# Patient Record
Sex: FEMALE | Race: BLACK OR AFRICAN AMERICAN | Employment: FULL TIME | URBAN - METROPOLITAN AREA
[De-identification: names, ages, dates, MRNs, and addresses within clinical notes are randomized per-mention and may not be internally consistent; named-entity substitution may affect disease eponyms.]

---

## 2017-01-02 ENCOUNTER — TRANSFERRED RECORDS (OUTPATIENT)
Dept: HEALTH INFORMATION MANAGEMENT | Facility: CLINIC | Age: 37
End: 2017-01-02

## 2017-01-03 ENCOUNTER — TRANSFERRED RECORDS (OUTPATIENT)
Dept: HEALTH INFORMATION MANAGEMENT | Facility: CLINIC | Age: 37
End: 2017-01-03

## 2017-01-05 ENCOUNTER — TELEPHONE (OUTPATIENT)
Dept: FAMILY MEDICINE | Facility: CLINIC | Age: 37
End: 2017-01-05

## 2017-01-05 NOTE — TELEPHONE ENCOUNTER
Attempted to reach patient via language line to follow up after recent discharge on 1/4/17 from [facility]. Unable to reach. LVM with name and call back number.    Renata Bedoya RN

## 2017-04-11 ENCOUNTER — OFFICE VISIT (OUTPATIENT)
Dept: FAMILY MEDICINE | Facility: CLINIC | Age: 37
End: 2017-04-11

## 2017-04-11 VITALS
WEIGHT: 147.8 LBS | HEIGHT: 64 IN | TEMPERATURE: 97.8 F | DIASTOLIC BLOOD PRESSURE: 65 MMHG | HEART RATE: 77 BPM | SYSTOLIC BLOOD PRESSURE: 104 MMHG | BODY MASS INDEX: 25.23 KG/M2 | OXYGEN SATURATION: 99 % | RESPIRATION RATE: 18 BRPM

## 2017-04-11 DIAGNOSIS — B36.9 FUNGAL INFECTION OF SKIN: ICD-10-CM

## 2017-04-11 RX ORDER — NYSTATIN 100000 U/G
OINTMENT TOPICAL
Qty: 15 G | Refills: 1 | Status: SHIPPED | OUTPATIENT
Start: 2017-04-11 | End: 2017-05-11

## 2017-04-11 NOTE — PROGRESS NOTES
"      HPI:       Julio Cesar Cha is a 37 year old who presents for the following  Patient presents with:  Derm Problem: around mouth, small red, itchy, 2-3 weeks    Left cheek linear rash for 3 weeks, itchy  In the past had a right neck rash which improved with triamcinolone cream  Used triamcinalone cream with her current rash, it's not helping  Has had ringworm before, last year. Children have had it, last was 2 years ago.     Problem, Medication and Allergy Lists were reviewed and are current.  Patient is an established patient of this clinic.         Review of Systems:   Review of Systems          Physical Exam:   Patient Vitals for the past 24 hrs:   BP Temp Temp src Pulse Resp SpO2 Height Weight   04/11/17 1331 104/65 97.8  F (36.6  C) Oral 77 18 99 % 5' 3.75\" (161.9 cm) 147 lb 12.8 oz (67 kg)     Body mass index is 25.57 kg/(m^2).     Physical Exam   Constitutional: She appears well-developed and well-nourished.   HENT:   Head: Normocephalic and atraumatic.       2 curved linear streaks on left side of face, raised, red, shiny   Eyes: Conjunctivae are normal.   Cardiovascular: Normal rate.    Pulmonary/Chest: Effort normal. No respiratory distress.   Skin: Skin is warm and dry. Rash noted. No erythema. No pallor.   Vitals reviewed.      Results:       Assessment and Plan     Patient Instructions   Here is the plan from today's visit    1. Fungal infection of skin - ringworm  - nystatin (MYCOSTATIN) ointment; Apply pea-sized amount to face rash two times daily until 1 week after the rash disappears  Dispense: 15 g; Refill: 1  Do not use cream for more than 2 weeks total    Please call or return to clinic if your symptoms don't go away.    Follow up plan  Follow up if you are not improving in the next 1 week.    There are no discontinued medications.  Options for treatment and follow-up care were reviewed with the patient. Julio Cesar Cha  engaged in the decision making process and verbalized " understanding of the options discussed and agreed with the final plan.    I spent 15 min face to face with the patient and >50% was spent counselling the patient about the above medical conditions, educating patient and discussing the recommendations and followup.      Becca Kan MD   of [facility], [facility]

## 2017-04-11 NOTE — PATIENT INSTRUCTIONS
Here is the plan from today's visit    1. Fungal infection of skin  - nystatin (MYCOSTATIN) ointment; Apply pea-sized amount to face rash two times daily until 1 week after the rash disappears  Dispense: 15 g; Refill: 1  Do not use cream for more than 2 weeks total    Please call or return to clinic if your symptoms don't go away.    Follow up plan  Follow up if you are not improving in the next 1 week.    Thank you for coming to [facility] today.  Lab Testing:  **If you had lab testing today and your results are reassuring or normal they will be mailed to you or sent through [employer] within 7 days.   **If the lab tests need quick action we will call you with the results.  The phone number we will call with results is # [phone] (home) . If this is not the best number please call our clinic and change the number.  Medication Refills:  If you need any refills please call your pharmacy and they will contact us.   If you need to  your refill at a new pharmacy, please contact the new pharmacy directly. The new pharmacy will help you get your medications transferred faster.   Scheduling:  If you have any concerns about today's visit or wish to schedule another appointment please call our office during normal business hours [phone] (8-5:00 M-F)  If a referral was made to a [facility] and you don't get a call from central scheduling please call [phone].  If a Mammogram was ordered for you at [facility] call [phone] to schedule or change your appointment.  If you had an XRay/CT/Ultrasound/MRI ordered the number is [phone] to schedule or change your radiology appointment.   Medical Concerns:  If you have urgent medical concerns please call [phone] at any time of the day.

## 2017-04-11 NOTE — MR AVS SNAPSHOT
After Visit Summary   4/11/2017    Julio Cesar Cha    MRN: [id number]           Patient Information     Date Of Birth          1980        Visit Information        Provider Department      4/11/2017 1:20 PM Becca Kan MD [facility]        Today's Diagnoses     Fungal infection of skin          Care Instructions    Here is the plan from today's visit    1. Fungal infection of skin  - nystatin (MYCOSTATIN) ointment; Apply pea-sized amount to face rash two times daily until 1 week after the rash disappears  Dispense: 15 g; Refill: 1  Do not use cream for more than 2 weeks total    Please call or return to clinic if your symptoms don't go away.    Follow up plan  Follow up if you are not improving in the next 1 week.    Thank you for coming to [facility] today.  Lab Testing:  **If you had lab testing today and your results are reassuring or normal they will be mailed to you or sent through [employer] within 7 days.   **If the lab tests need quick action we will call you with the results.  The phone number we will call with results is # [phone] (home) . If this is not the best number please call our clinic and change the number.  Medication Refills:  If you need any refills please call your pharmacy and they will contact us.   If you need to  your refill at a new pharmacy, please contact the new pharmacy directly. The new pharmacy will help you get your medications transferred faster.   Scheduling:  If you have any concerns about today's visit or wish to schedule another appointment please call our office during normal business hours [phone] (8-5:00 M-F)  If a referral was made to a [facility] and you don't get a call from central scheduling please call [phone].  If a Mammogram was ordered for you at [facility] call [phone] to schedule or change your appointment.  If you had an XRay/CT/Ultrasound/MRI  "ordered the number is [phone] to schedule or change your radiology appointment.   Medical Concerns:  If you have urgent medical concerns please call [phone] at any time of the day.          Follow-ups after your visit        Who to contact     Please call your clinic at [phone] to:    Ask questions about your health    Make or cancel appointments    Discuss your medicines    Learn about your test results    Speak to your doctor   If you have compliments or concerns about an experience at your clinic, or if you wish to file a complaint, please contact [facility] Patient Relations at [phone] or email us at [email or web address]         Additional Information About Your Visit        [employer] Information     [employer] is an electronic gateway that provides easy, online access to your medical records. With [employer], you can request a clinic appointment, read your test results, renew a prescription or communicate with your care team.     To sign up for [employer] visit the website at [email or web address]   You will be asked to enter the access code listed below, as well as some personal information. Please follow the directions to create your username and password.     Your access code is: [email or web address]  Expires: 7/10/2017  1:44 PM     Your access code will  in 90 days. If you need help or a new code, please contact your [facility] or call [phone] for assistance.        Care EveryWhere ID     This is your Care EveryWhere ID. This could be used by other organizations to access your [place] medical records  [id number]        Your Vitals Were     Pulse Temperature Respirations Height Pulse Oximetry BMI (Body Mass Index)    77 97.8  F (36.6  C) (Oral) 18 5' 3.75\" (161.9 cm) 99% 25.57 kg/m2       Blood Pressure from Last 3 Encounters:   17 104/65   16 117/77   16 101/67    Weight from Last 3 Encounters: "   04/11/17 147 lb 12.8 oz (67 kg)   12/05/16 145 lb 13.9 oz (66.2 kg)   07/25/16 143 lb 6.4 oz (65 kg)              Today, you had the following     No orders found for display         Today's Medication Changes          These changes are accurate as of: 4/11/17  1:44 PM.  If you have any questions, ask your nurse or doctor.               Start taking these medicines.        Dose/Directions    nystatin ointment   Commonly known as:  MYCOSTATIN   Used for:  Fungal infection of skin   Started by:  Becca Kan MD        Apply pea-sized amount to face rash two times daily until 1 week after the rash disappears   Quantity:  15 g   Refills:  1            Where to get your medicines      These medications were sent to [facility] [place], [place] [address] [facility], [facility] [address]  [address] [address] [address]     Phone:  [phone]     nystatin ointment                Primary Care Provider Office Phone # Fax #    Ying Mahan [phone]       [facility] [address] [place] [address]        Thank you!     Thank you for choosing [facility]  for your care. Our goal is always to provide you with excellent care. Hearing back from our patients is one way we can continue to improve our services. Please take a few minutes to complete the written survey that you may receive in the mail after your visit with us. Thank you!             Your Updated Medication List - Protect others around you: Learn how to safely use, store and throw away your medicines at www.disposemymeds.org.          This list is accurate as of: 4/11/17  1:44 PM.  Always use your most recent med list.                   Brand Name Dispense Instructions for use    acetaminophen 500 MG tablet    TYLENOL    100 tablet    Take 1-2 tablets (500-1,000 mg) by mouth every 6 hours as needed for mild pain       cyclobenzaprine 5 MG tablet    FLEXERIL    15 tablet     Take 1 tablet (5 mg) by mouth daily as needed for muscle spasms       diphenhydrAMINE 25 MG tablet    BENADRYL    60 tablet    Take 1-2 tablets (25-50 mg) by mouth every 6 hours as needed for itching or allergies       nystatin ointment    MYCOSTATIN    15 g    Apply pea-sized amount to face rash two times daily until 1 week after the rash disappears       prenatal multivitamin  plus iron 27-0.8 MG Tabs per tablet     100 tablet    Take 1 tablet by mouth daily       triamcinolone 0.5 % cream    KENALOG    30 g    Apply sparingly to affected area three times daily.       vitamin D 2000 UNITS tablet     100 tablet    Take 2 tablets by mouth daily

## 2017-04-26 ENCOUNTER — TELEPHONE (OUTPATIENT)
Dept: FAMILY MEDICINE | Facility: CLINIC | Age: 37
End: 2017-04-26

## 2017-04-26 NOTE — TELEPHONE ENCOUNTER
Looking at the note, there isn't any obvious alternative. And Dr. Kan isn't back until Mon. Probably best option is to be seen again in clinic.     Message routed to FD. Please schedule appointment in clinic to discuss per PCP.    Renata Bedoya RN

## 2017-04-26 NOTE — TELEPHONE ENCOUNTER
[facility] phone call message- medication clarification/question:    Full Medication Name:nystatin (MYCOSTATIN) ointment    Question: patient was seen in clinic recently and was prescribed above medication and was instructed to use medication  7 days and stop. Patient states the medication is not working and requesting different medication that might work for her.    Pharmacy confirmed as    [facility] [place], [place] - [address] [facility], [address]: Yes    OK to leave a message on voice mail? Yes    Primary language: Dutch      needed? Yes    Call taken on April 26, 2017 at 11:26 AM by Danna Dixon

## 2017-04-26 NOTE — TELEPHONE ENCOUNTER
Message routed to PCP. Patient seen in clinic 4/11/17 by Dr. Kan.     Please review and sent alternative to pharmacy if appropriate.    Renata Bedoya RN

## 2017-05-01 ENCOUNTER — OFFICE VISIT (OUTPATIENT)
Dept: FAMILY MEDICINE | Facility: CLINIC | Age: 37
End: 2017-05-01

## 2017-05-01 VITALS
BODY MASS INDEX: 25.64 KG/M2 | RESPIRATION RATE: 20 BRPM | SYSTOLIC BLOOD PRESSURE: 105 MMHG | TEMPERATURE: 97.8 F | HEART RATE: 80 BPM | OXYGEN SATURATION: 100 % | DIASTOLIC BLOOD PRESSURE: 71 MMHG | WEIGHT: 148.2 LBS

## 2017-05-01 DIAGNOSIS — L30.9 DERMATITIS: Primary | ICD-10-CM

## 2017-05-01 NOTE — MR AVS SNAPSHOT
After Visit Summary   2017    Julio Cesar Cha    MRN: [id number]           Patient Information     Date Of Birth          1980        Visit Information        Provider Department      2017 1:40 PM Ying Mahan MD [facility]         Follow-ups after your visit        Who to contact     Please call your clinic at [phone] to:    Ask questions about your health    Make or cancel appointments    Discuss your medicines    Learn about your test results    Speak to your doctor   If you have compliments or concerns about an experience at your clinic, or if you wish to file a complaint, please contact [facility] Patient Relations at [phone] or email us at [email or web address]         Additional Information About Your Visit        MyChart Information     [employer] is an electronic gateway that provides easy, online access to your medical records. With [employer], you can request a clinic appointment, read your test results, renew a prescription or communicate with your care team.     To sign up for [employer] visit the website at [email or web address]   You will be asked to enter the access code listed below, as well as some personal information. Please follow the directions to create your username and password.     Your access code is: [email or web address]  Expires: 7/10/2017  1:44 PM     Your access code will  in 90 days. If you need help or a new code, please contact your [facility] or call [phone] for assistance.        Care EveryWhere ID     This is your Care EveryWhere ID. This could be used by other organizations to access your [place] medical records  [id number]        Your Vitals Were     Pulse Temperature Respirations Pulse Oximetry Breastfeeding? BMI (Body Mass Index)    80 97.8  F (36.6  C) (Oral) 20 100% No 25.64 kg/m2       Blood Pressure from Last 3 Encounters:    05/01/17 105/71   04/11/17 104/65   12/05/16 117/77    Weight from Last 3 Encounters:   05/01/17 148 lb 3.2 oz (67.2 kg)   04/11/17 147 lb 12.8 oz (67 kg)   12/05/16 145 lb 13.9 oz (66.2 kg)              Today, you had the following     No orders found for display       Primary Care Provider Office Phone # Fax #    Ying Mahan [phone]       [facility] [address] [address]        Thank you!     Thank you for choosing hospitals [facility]  for your care. Our goal is always to provide you with excellent care. Hearing back from our patients is one way we can continue to improve our services. Please take a few minutes to complete the written survey that you may receive in the mail after your visit with us. Thank you!             Your Updated Medication List - Protect others around you: Learn how to safely use, store and throw away your medicines at www.disposemymeds.org.          This list is accurate as of: 5/1/17  2:36 PM.  Always use your most recent med list.                   Brand Name Dispense Instructions for use    acetaminophen 500 MG tablet    TYLENOL    100 tablet    Take 1-2 tablets (500-1,000 mg) by mouth every 6 hours as needed for mild pain       cyclobenzaprine 5 MG tablet    FLEXERIL    15 tablet    Take 1 tablet (5 mg) by mouth daily as needed for muscle spasms       diphenhydrAMINE 25 MG tablet    BENADRYL    60 tablet    Take 1-2 tablets (25-50 mg) by mouth every 6 hours as needed for itching or allergies       nystatin ointment    MYCOSTATIN    15 g    Apply pea-sized amount to face rash two times daily until 1 week after the rash disappears       prenatal multivitamin  plus iron 27-0.8 MG Tabs per tablet     100 tablet    Take 1 tablet by mouth daily       triamcinolone 0.5 % cream    KENALOG    30 g    Apply sparingly to affected area three times daily.       vitamin D 2000 UNITS tablet     100 tablet    Take 2 tablets  by mouth daily

## 2017-05-01 NOTE — PROGRESS NOTES
SUBJECTIVE:  The patient is here with a recurrent rash, primarily on her face.  She said that initially, she thought it was ringworm because it was similar to what her children had and at one point was given antifungal for it.  Again, the history is a little confusing.  Then at some point she was switched over to triamcinolone and that did not seem to help.  Most recently she was put on an antifungal, and she said the rash would go away after a few days, but then come right back.  She would start the medicine again and again it would go away, come back, go away, come back.  Primarily on her cheeks but also on her neck.  The rash is itchy and at least this most recent go round the last month or 2, it has not been at all circular.  Otherwise, she is feeling well.      OBJECTIVE:  On exam, the patient is in no acute distress.  Vital signs are stable.  The areas on the face that she has are erythematous, macular and in lines, a linear configuration, which are underneath what looks like old hyperpigmentation of where she has had this previously.  This is on both cheeks.  She also on the right side of her neck has a similar macular area next to another hyperpigmented area where she apparently had the rash previously.  I do not see that she has ever had a KOH of this, but right now it does not look at all scaly, like anything would be obtained with the skin scraping.      IMPRESSION:  Recurrent rash, uncertain etiology.      PLAN:  We will just go ahead with a Derm referral at this point.  The patient was fine with that.      Visit length 15 minutes, all spent counseling about her rash.      Ying Schofield MD

## 2017-05-11 ENCOUNTER — OFFICE VISIT (OUTPATIENT)
Dept: FAMILY MEDICINE | Facility: CLINIC | Age: 37
End: 2017-05-11
Payer: COMMERCIAL

## 2017-05-11 VITALS
WEIGHT: 148 LBS | SYSTOLIC BLOOD PRESSURE: 106 MMHG | OXYGEN SATURATION: 100 % | DIASTOLIC BLOOD PRESSURE: 68 MMHG | HEART RATE: 90 BPM | BODY MASS INDEX: 25.6 KG/M2

## 2017-05-11 DIAGNOSIS — R21 RASH: Primary | ICD-10-CM

## 2017-05-11 LAB
KOH PREP SPEC: NORMAL
MICRO REPORT STATUS: NORMAL
SPECIMEN SOURCE: NORMAL

## 2017-05-11 PROCEDURE — 99213 OFFICE O/P EST LOW 20 MIN: CPT | Performed by: PHYSICIAN ASSISTANT

## 2017-05-11 PROCEDURE — 87210 SMEAR WET MOUNT SALINE/INK: CPT | Performed by: PHYSICIAN ASSISTANT

## 2017-05-11 RX ORDER — HYDROCORTISONE VALERATE 2 MG/G
OINTMENT TOPICAL
Qty: 45 G | Refills: 0 | Status: SHIPPED | OUTPATIENT
Start: 2017-05-11 | End: 2018-05-24

## 2017-05-11 NOTE — MR AVS SNAPSHOT
"              After Visit Summary   2017    Julio Cesar Cha    MRN: [id number]           Patient Information     Date Of Birth          1980        Visit Information        Provider Department      2017 12:50 PM Lynn, Saadia; Jay Watts PA-C [facility]        Today's Diagnoses     Rash    -  1       Follow-ups after your visit        Who to contact     If you have questions or need follow up information about today's clinic visit or your schedule please contact [facility] directly at [phone].  Normal or non-critical lab and imaging results will be communicated to you by [employer], letter or phone within 4 business days after the clinic has received the results. If you do not hear from us within 7 days, please contact the clinic through [employer] or phone. If you have a critical or abnormal lab result, we will notify you by phone as soon as possible.  Submit refill requests through rankdesk or call your pharmacy and they will forward the refill request to us. Please allow 3 business days for your refill to be completed.          Additional Information About Your Visit        MyChart Information     rankdesk lets you send messages to your doctor, view your test results, renew your prescriptions, schedule appointments and more. To sign up, go to [email or web address] . Click on \"Log in\" on the left side of the screen, which will take you to the Welcome page. Then click on \"Sign up Now\" on the right side of the page.     You will be asked to enter the access code listed below, as well as some personal information. Please follow the directions to create your username and password.     Your access code is: [email or web address]  Expires: 7/10/2017  1:44 PM     Your access code will  in 90 days. If you need help or a new code, please call your [facility] or [phone].        Care EveryWhere ID     This is your Care EveryWhere ID. This could be used by " other organizations to access your [place] medical records  [id number]        Your Vitals Were     Pulse Pulse Oximetry BMI (Body Mass Index)             90 100% 25.6 kg/m2          Blood Pressure from Last 3 Encounters:   05/11/17 106/68   05/01/17 105/71   04/11/17 104/65    Weight from Last 3 Encounters:   05/11/17 148 lb (67.1 kg)   05/01/17 148 lb 3.2 oz (67.2 kg)   04/11/17 147 lb 12.8 oz (67 kg)              We Performed the Following     KOH prep (Other than skin, nails, hair)          Today's Medication Changes          These changes are accurate as of: 5/11/17  1:36 PM.  If you have any questions, ask your nurse or doctor.               Start taking these medicines.        Dose/Directions    hydrocortisone valerate 0.2 % ointment   Commonly known as:  WEST-GABRIEL   Used for:  Rash   Started by:  Jay Watts PA-C        Apply sparingly to affected area three times daily as needed.   Quantity:  45 g   Refills:  0         Stop taking these medicines if you haven't already. Please contact your care team if you have questions.     cyclobenzaprine 5 MG tablet   Commonly known as:  FLEXERIL   Stopped by:  Jay Watts PA-C           diphenhydrAMINE 25 MG tablet   Commonly known as:  BENADRYL   Stopped by:  Jay Watts PA-C           nystatin ointment   Commonly known as:  MYCOSTATIN   Stopped by:  Jay Watts PA-C                Where to get your medicines      These medications were sent to [facility] [address], [facility] [address]  [address] [place] [address]     Phone:  [phone]     hydrocortisone valerate 0.2 % ointment                Primary Care Provider Office Phone # Fax #    Ying Mahan [phone]       [facility] [address] [address]        Thank you!     Thank you for choosing [facility]  for your care. Our goal is always to provide  you with excellent care. Hearing back from our patients is one way we can continue to improve our services. Please take a few minutes to complete the written survey that you may receive in the mail after your visit with us. Thank you!             Your Updated Medication List - Protect others around you: Learn how to safely use, store and throw away your medicines at www.disposemymeds.org.          This list is accurate as of: 5/11/17  1:36 PM.  Always use your most recent med list.                   Brand Name Dispense Instructions for use    acetaminophen 500 MG tablet    TYLENOL    100 tablet    Take 1-2 tablets (500-1,000 mg) by mouth every 6 hours as needed for mild pain       hydrocortisone valerate 0.2 % ointment    WEST-GABRIEL    45 g    Apply sparingly to affected area three times daily as needed.       prenatal multivitamin  plus iron 27-0.8 MG Tabs per tablet     100 tablet    Take 1 tablet by mouth daily       triamcinolone 0.5 % cream    KENALOG    30 g    Apply sparingly to affected area three times daily.       vitamin D 2000 UNITS tablet     100 tablet    Take 2 tablets by mouth daily

## 2017-05-11 NOTE — PROGRESS NOTES
SUBJECTIVE:                                                    Julio Cesar Cha is a 37 year old female who presents to clinic today for the following health issues:    Rash      Duration: 2 weeks    Description  Location: cheeks   Itching: severe    Intensity:  severe    Accompanying signs and symptoms: painful, red    History (similar episodes/previous evaluation): yes, on arm and neck     Precipitating or alleviating factors:  New exposures:  None  Recent travel: no      Therapies tried and outcome: Benadryl/diphenhydramine -  not effective and nystatin cream, kenalog cream     Has seen multiple providers for this. Was given referral to Derm.   No new products. Medication crms not helping    Problem list and histories reviewed & adjusted, as indicated.  Additional history: as documented    Patient Active Problem List   Diagnosis     Hoarseness     Vitamin D deficiency     Anemia     Past Surgical History:   Procedure Laterality Date     ORTHOPEDIC SURGERY         Social History   Substance Use Topics     Smoking status: Never Smoker     Smokeless tobacco: Not on file     Alcohol use No     Family History   Problem Relation Age of Onset     Family History Negative Mother      Family History Negative Father      Family History Negative Sister      DIABETES Brother          Current Outpatient Prescriptions   Medication Sig Dispense Refill     hydrocortisone valerate (WEST-GABRIEL) 0.2 % ointment Apply sparingly to affected area three times daily as needed. 45 g 0     Prenatal Vit-Fe Fumarate-FA (PRENATAL MULTIVITAMIN  PLUS IRON) 27-0.8 MG TABS per tablet Take 1 tablet by mouth daily 100 tablet 3     Cholecalciferol (VITAMIN D) 2000 UNITS tablet Take 2 tablets by mouth daily 100 tablet 3     triamcinolone (KENALOG) 0.5 % cream Apply sparingly to affected area three times daily. 30 g 1     acetaminophen (TYLENOL) 500 MG tablet Take 1-2 tablets (500-1,000 mg) by mouth every 6 hours as needed for mild pain (Patient  not taking: Reported on 5/11/2017) 100 tablet 0     Allergies   Allergen Reactions     Omeprazole Diarrhea       ROS:  Constitutional, HEENT, cardiovascular, pulmonary, gi and gu systems are negative, except as otherwise noted.    OBJECTIVE:                                                    /68  Pulse 90  Wt 148 lb (67.1 kg)  SpO2 100%  BMI 25.6 kg/m2  Body mass index is 25.6 kg/(m^2).  GENERAL: healthy, alert and no distress  SKIN: faint erythmatous raised patches kraig Cheeks and mouth.     Diagnostic Test Results:  Results for orders placed or performed in visit on 05/11/17 (from the past 24 hour(s))   KOH prep (Other than skin, nails, hair)   Result Value Ref Range    Specimen Description Face     KOH Prep No fungal elements seen     Micro Report Status FINAL 05/11/2017         ASSESSMENT/PLAN:                                                        ICD-10-CM    1. Rash R21 KOH prep (Other than skin, nails, hair)     hydrocortisone valerate (WEST-GABRIEL) 0.2 % ointment   keep follow up  With Derm  Start westcort crm.   warning signs discussed.  side effects discussed  Follow up  As needed     Jay Watts PA-C  [facility]

## 2017-05-23 ENCOUNTER — TELEPHONE (OUTPATIENT)
Dept: FAMILY MEDICINE | Facility: CLINIC | Age: 37
End: 2017-05-23

## 2017-05-24 ENCOUNTER — TELEPHONE (OUTPATIENT)
Dept: FAMILY MEDICINE | Facility: CLINIC | Age: 37
End: 2017-05-24

## 2017-05-24 NOTE — TELEPHONE ENCOUNTER
Prior Authorizion is required for: Lamisil 250mg ---Insurance will only cover 1 tablet per day---  Patient Insurance: [email or web address]  Insurance Phone number:[phone]  Patient ID#:[id number]    Please contact pharmacy with approval or denial.    Thank You    Shahana Poon, [facility]  [phone]

## 2017-09-06 ENCOUNTER — PATIENT OUTREACH (OUTPATIENT)
Dept: FAMILY MEDICINE | Facility: CLINIC | Age: 37
End: 2017-09-06

## 2017-09-30 ENCOUNTER — HEALTH MAINTENANCE LETTER (OUTPATIENT)
Age: 37
End: 2017-09-30

## 2018-02-27 DIAGNOSIS — Z00.00 HEALTHCARE MAINTENANCE: ICD-10-CM

## 2018-02-27 RX ORDER — PRENATAL VIT/IRON FUM/FOLIC AC 27MG-0.8MG
1 TABLET ORAL DAILY
Qty: 100 TABLET | Refills: 0 | Status: SHIPPED | OUTPATIENT
Start: 2018-02-27 | End: 2018-05-24

## 2018-02-27 NOTE — TELEPHONE ENCOUNTER
Request for medication refill:    Date of last visit at clinic: 5/1/2017    Please complete refill if appropriate and CLOSE ENCOUNTER.    Closing the encounter signifies the refill is complete.    If refill has been denied, please complete the smart phrase .smirefuse and route it to the [facility] RN TRIAGE pool to inform the patient and the pharmacy.    Ailyn Perez, CMA

## 2018-05-08 ENCOUNTER — OFFICE VISIT (OUTPATIENT)
Dept: FAMILY MEDICINE | Facility: CLINIC | Age: 38
End: 2018-05-08
Payer: COMMERCIAL

## 2018-05-08 VITALS
HEART RATE: 88 BPM | OXYGEN SATURATION: 100 % | RESPIRATION RATE: 14 BRPM | BODY MASS INDEX: 28.37 KG/M2 | DIASTOLIC BLOOD PRESSURE: 69 MMHG | SYSTOLIC BLOOD PRESSURE: 119 MMHG | WEIGHT: 164 LBS | TEMPERATURE: 97 F

## 2018-05-08 DIAGNOSIS — N91.2 AMENORRHEA: ICD-10-CM

## 2018-05-08 DIAGNOSIS — Z32.01 PREGNANCY TEST POSITIVE: Primary | ICD-10-CM

## 2018-05-08 LAB — BETA HCG QUAL IFA URINE: POSITIVE

## 2018-05-08 PROCEDURE — 99213 OFFICE O/P EST LOW 20 MIN: CPT | Performed by: PHYSICIAN ASSISTANT

## 2018-05-08 PROCEDURE — 84703 CHORIONIC GONADOTROPIN ASSAY: CPT | Performed by: PHYSICIAN ASSISTANT

## 2018-05-08 RX ORDER — SWAB
1 SWAB, NON-MEDICATED MISCELLANEOUS DAILY
Qty: 90 EACH | Refills: 2 | Status: SHIPPED | OUTPATIENT
Start: 2018-05-08 | End: 2018-07-17

## 2018-05-08 NOTE — PROGRESS NOTES
SUBJECTIVE:   Julio Cesar Cha is a 38 year old female who presents to clinic today for the following health issues:      Patient c/o amenorrhea X 1 month. Periods usually every 2--25 days. Report breast tenderness and nausea. Has 5 children. Trying to get pregnant.        Allergies   Allergen Reactions     Omeprazole Diarrhea       Past Medical History:   Diagnosis Date     Anemia 2/27/2012         Current Outpatient Prescriptions on File Prior to Visit:  acetaminophen (TYLENOL) 500 MG tablet Take 1-2 tablets (500-1,000 mg) by mouth every 6 hours as needed for mild pain (Patient not taking: Reported on 5/11/2017)   Cholecalciferol (VITAMIN D) 2000 UNITS tablet Take 2 tablets by mouth daily   hydrocortisone valerate (WEST-GABRIEL) 0.2 % ointment Apply sparingly to affected area three times daily as needed.   Prenatal Vit-Fe Fumarate-FA (PRENATAL MULTIVITAMIN PLUS IRON) 27-0.8 MG TABS per tablet Take 1 tablet by mouth daily   triamcinolone (KENALOG) 0.5 % cream Apply sparingly to affected area three times daily.     No current facility-administered medications on file prior to visit.     Social History   Substance Use Topics     Smoking status: Never Smoker     Smokeless tobacco: Not on file     Alcohol use No       ROS:  General: negative for fever  ABD: Denies abd pain  : as above    OBJECTIVE:   /69  Pulse 88  Temp 97  F (36.1  C) (Oral)  Resp 14  Wt 164 lb (74.4 kg)  LMP 04/08/2018 (Exact Date)  SpO2 100%  BMI 28.37 kg/m2    General:   awake, alert, and cooperative.  NAD.   Head: Normocephalic, atraumatic.  Eyes: Conjunctiva clear, non icteric.   ABD: soft, no tenderness to palpation , no rigidity, guarding or rebound . No CVAT  Neuro: Alert and oriented - normal speech.   Results for orders placed or performed in visit on 05/08/18   Beta HCG Qual, Urine - [facility] [place] ([id number])   Result Value Ref Range    Beta HCG Qual IFA Urine Positive (A) NEG^Negative          ASSESSMENT:       ICD-10-CM    1. Pregnancy test positive Z32.01 Prenatal Vit-Fe Fumarate-FA (PRENATAL MULTIVITAMIN  WITH IRON) 28-0.8 MG TABS   2. Amenorrhea N91.2 Beta HCG Qual, Urine - [facility] [place] ([id number])             PLAN: 4 weeks along. Schedule initial OB 4-6 weeks. Prenatal vits. Lidya for nausea.      INGE MonC

## 2018-05-14 ENCOUNTER — TELEPHONE (OUTPATIENT)
Dept: FAMILY MEDICINE | Facility: CLINIC | Age: 38
End: 2018-05-14

## 2018-05-14 DIAGNOSIS — Z32.01 PREGNANCY TEST POSITIVE: Primary | ICD-10-CM

## 2018-05-14 NOTE — TELEPHONE ENCOUNTER
Confirmation of pregnancy visit: 5/8/18    LMP: 4/8/18  Gestational Age: 5w1d   Estimated Date of Delivery: 1/13/19    Dating US has been ordered. Please call patient to schedule US week of 6/4/18 (8 weeks gestation).     NOB may be scheduled after US is completed. May be scheduled same day, but US must be completed first.    When calling to schedule NOB, please give scheduling preference to the following providers. If ok with male provider, please offer only male provider appointments (unless no availability for >2 weeks)    Male:  1. Dr. Robertson  2. Dr. Nieto    Female:  1. Dr. Mason  2. Dr. Lott    Other Notes:  Nigerian      Please document call and close encounter.    Talya Hatfield RN

## 2018-05-14 NOTE — TELEPHONE ENCOUNTER
Patient was seen at a [facility] and took a pregnancy test on 5/8/18. It was positive. Patient would like to schedule a NOB. LMP 4/8/18.

## 2018-05-21 NOTE — TELEPHONE ENCOUNTER
Spoke with patient using the language line (ID #[id number]. I gave the patient the number ([phone]) to schedule the Ultrasound. I let her know that once she completes her Ultrasound, to call the clinic back to schedule a new OB. She expressed that she only wanted to see Dr. Mahan for her OB care.     Kenna So

## 2018-05-24 ENCOUNTER — OFFICE VISIT (OUTPATIENT)
Dept: FAMILY MEDICINE | Facility: CLINIC | Age: 38
End: 2018-05-24
Payer: COMMERCIAL

## 2018-05-24 VITALS
DIASTOLIC BLOOD PRESSURE: 69 MMHG | SYSTOLIC BLOOD PRESSURE: 115 MMHG | WEIGHT: 161.4 LBS | HEART RATE: 81 BPM | TEMPERATURE: 98.3 F | BODY MASS INDEX: 27.92 KG/M2 | OXYGEN SATURATION: 100 %

## 2018-05-24 DIAGNOSIS — O26.891 PREGNANCY HEADACHE IN FIRST TRIMESTER: ICD-10-CM

## 2018-05-24 DIAGNOSIS — R51.9 PREGNANCY HEADACHE IN FIRST TRIMESTER: ICD-10-CM

## 2018-05-24 DIAGNOSIS — O21.9 NAUSEA AND VOMITING IN PREGNANCY: Primary | ICD-10-CM

## 2018-05-24 DIAGNOSIS — Z32.01 PREGNANCY TEST POSITIVE: ICD-10-CM

## 2018-05-24 RX ORDER — ACETAMINOPHEN 500 MG
1000 TABLET ORAL EVERY 6 HOURS PRN
Qty: 100 TABLET | Refills: 0 | Status: SHIPPED | OUTPATIENT
Start: 2018-05-24 | End: 2020-04-16

## 2018-05-24 RX ORDER — PYRIDOXINE HCL (VITAMIN B6) 25 MG
25 TABLET ORAL DAILY
Qty: 30 TABLET | Refills: 2 | Status: SHIPPED | OUTPATIENT
Start: 2018-05-24 | End: 2018-07-17

## 2018-05-24 ASSESSMENT — ENCOUNTER SYMPTOMS
ABDOMINAL PAIN: 0
VOMITING: 1
WEAKNESS: 0
NAUSEA: 1
CONSTITUTIONAL NEGATIVE: 1
CARDIOVASCULAR NEGATIVE: 1
HEADACHES: 1
DIZZINESS: 0
RESPIRATORY NEGATIVE: 1

## 2018-05-24 NOTE — PROGRESS NOTES
HPI:       Julio Cesar Cha is a 38 year old who presents for the following  Patient presents with:  Nausea: naseau and vomitting x3 weeks  Headache: on right side of head-1w      LMP:  18, history of regular menses.        +Nausea and vomiting.  Patient states that she is vomiting after eating.  Is drinking adequate fluids.    +Headache.  Has been taking Tylenol and Ibuprofen as needed without much improvement.       Is not currently fasting for Ramadan.        A [employer]  was used for  this visit.          Problem, Medication and Allergy Lists were   reviewed and are current.     Patient Active Problem List    Diagnosis Date Noted     Vitamin D deficiency 2015     Priority: Medium     Anemia 2015     Priority: Medium     Hoarseness 2015     Priority: Medium   ,     Current Outpatient Prescriptions   Medication Sig Dispense Refill     acetaminophen (TYLENOL) 500 MG tablet Take 2 tablets (1,000 mg) by mouth every 6 hours as needed for mild pain 100 tablet 0     acetaminophen (TYLENOL) 500 MG tablet Take 1-2 tablets (500-1,000 mg) by mouth every 6 hours as needed for mild pain 100 tablet 0     Cholecalciferol (VITAMIN D) 2000 UNITS tablet Take 2 tablets by mouth daily 100 tablet 3     doxylamine (UNISOM) 25 MG TABS tablet Take 1 tablet (25 mg) by mouth At Bedtime 28 each 2     Prenatal Vit-Fe Fumarate-FA (PRENATAL MULTIVITAMIN  WITH IRON) 28-0.8 MG TABS Take 1 tablet by mouth daily 90 each 2     pyridOXINE (VITAMIN B-6) 25 MG tablet Take 1 tablet (25 mg) by mouth daily 30 tablet 2   ,     Allergies   Allergen Reactions     Omeprazole Diarrhea     Patient is an established patient of this clinic.         Review of Systems:   Review of Systems   Constitutional: Negative.    Respiratory: Negative.    Cardiovascular: Negative.    Gastrointestinal: Positive for nausea and vomiting. Negative for abdominal pain.   Neurological: Positive for headaches. Negative for dizziness  and weakness.             Physical Exam:   Patient Vitals for the past 24 hrs:   BP Temp Temp src Pulse SpO2 Weight   18 1305 115/69 98.3  F (36.8  C) Oral 81 100 % 161 lb 6.4 oz (73.2 kg)     Body mass index is 27.92 kg/(m^2).  Vitals were reviewed and were normal     Physical Exam   Constitutional: She is oriented to person, place, and time. She appears well-nourished. No distress.   Cardiovascular: Normal rate and regular rhythm.    Pulmonary/Chest: Effort normal and breath sounds normal.   Neurological: She is alert and oriented to person, place, and time. No cranial nerve deficit.   Psychiatric: She has a normal mood and affect.         Results:       Assessment and Plan     Julio Cesar was seen today for nausea and headache.    Diagnoses and all orders for this visit:    Nausea and vomiting in pregnancy  -     pyridOXINE (VITAMIN B-6) 25 MG tablet; Take 1 tablet (25 mg) by mouth daily  -     doxylamine (UNISOM) 25 MG TABS tablet; Take 1 tablet (25 mg) by mouth At Bedtime    Pregnancy headache in first trimester  -     acetaminophen (TYLENOL) 500 MG tablet; Take 2 tablets (1,000 mg) by mouth every 6 hours as needed for mild pain        -     Recommended against taking Ibuprofen during pregnancy.         -     Maintain adequate hydration and rest.      Pregnancy test positive - , 7+2 weeks gestation with ELZA:  01/10/19 based on LMP  Pregnancy confirmation on 18 at [facility].    Patient to call to schedule dating ultrasound.     Follow-up in clinic for 1st prenatal visit after dating ultrasound completed.     Options for treatment and follow-up care were reviewed with the patient. Julio Cesar Neil Cha  engaged in the decision making process and verbalized understanding of the options discussed and agreed with the final plan.    Emily Mccray, APRN CNP

## 2018-05-24 NOTE — NURSING NOTE
Due to patient being non-English speaking/uses sign language, an  was used for this visit. Only for face-to-face interpretation by an external agency, date and length of interpretation can be found on the scanned worksheet.     name: keri gentile  Agency: [employer]  Language: Mauritanian   Telephone number: [phone]  Type of interpretation: Face-to-face, spoken

## 2018-05-24 NOTE — PATIENT INSTRUCTIONS
Here is the plan from today's visit    1. Nausea and vomiting in pregnancy  - pyridOXINE (VITAMIN B-6) 25 MG tablet; Take 1 tablet (25 mg) by mouth daily  Dispense: 30 tablet; Refill: 2  - doxylamine (UNISOM) 25 MG TABS tablet; Take 1 tablet (25 mg) by mouth At Bedtime  Dispense: 28 each; Refill: 2    2. Pregnancy headache in first trimester  - acetaminophen (TYLENOL) 500 MG tablet; Take 2 tablets (1,000 mg) by mouth every 6 hours as needed for mild pain  Dispense: 100 tablet; Refill: 0    3. Pregnancy test positive      Schedule dating ultrasound and then follow-up for first prenatal visit.      Follow-up no improvement or worsening of symptoms.     Thank you for coming to [facility] today.  Lab Testing:  **If you had lab testing today and your results are reassuring or normal they will be mailed to you or sent through [employer] within 7 days.   **If the lab tests need quick action we will call you with the results.  The phone number we will call with results is # [phone] (home) . If this is not the best number please call our clinic and change the number.  Medication Refills:  If you need any refills please call your pharmacy and they will contact us.   If you need to  your refill at a new pharmacy, please contact the new pharmacy directly. The new pharmacy will help you get your medications transferred faster.   Scheduling:  If you have any concerns about today's visit or wish to schedule another appointment please call our office during normal business hours [phone] (8-5:00 M-F)  If a referral was made to a [facility] and you don't get a call from central scheduling please call [phone].  If a Mammogram was ordered for you at [facility] call [phone] to schedule or change your appointment.  If you had an XRay/CT/Ultrasound/MRI ordered the number is [phone] to schedule or change your radiology appointment.   Medical Concerns:  If you have urgent  medical concerns please call [phone] at any time of the day.  If you have a medical emergency please call [phone].

## 2018-05-24 NOTE — MR AVS SNAPSHOT
After Visit Summary   5/24/2018    Julio Cesar Cha    MRN: [id number]           Patient Information     Date Of Birth          1980        Visit Information        Provider Department      5/24/2018 1:00 PM Emily Mccray APRN CNP [facility]        Today's Diagnoses     Nausea and vomiting in pregnancy    -  1    Pregnancy headache in first trimester        Pregnancy test positive          Care Instructions    Here is the plan from today's visit    1. Nausea and vomiting in pregnancy  - pyridOXINE (VITAMIN B-6) 25 MG tablet; Take 1 tablet (25 mg) by mouth daily  Dispense: 30 tablet; Refill: 2  - doxylamine (UNISOM) 25 MG TABS tablet; Take 1 tablet (25 mg) by mouth At Bedtime  Dispense: 28 each; Refill: 2    2. Pregnancy headache in first trimester  - acetaminophen (TYLENOL) 500 MG tablet; Take 2 tablets (1,000 mg) by mouth every 6 hours as needed for mild pain  Dispense: 100 tablet; Refill: 0    3. Pregnancy test positive      Schedule dating ultrasound and then follow-up for first prenatal visit.      Follow-up no improvement or worsening of symptoms.     Thank you for coming to [facility] today.  Lab Testing:  **If you had lab testing today and your results are reassuring or normal they will be mailed to you or sent through [employer] within 7 days.   **If the lab tests need quick action we will call you with the results.  The phone number we will call with results is # [phone] (home) . If this is not the best number please call our clinic and change the number.  Medication Refills:  If you need any refills please call your pharmacy and they will contact us.   If you need to  your refill at a new pharmacy, please contact the new pharmacy directly. The new pharmacy will help you get your medications transferred faster.   Scheduling:  If you have any concerns about today's visit or wish to schedule another appointment please call our office during  normal business hours [phone] (8-5:00 M-F)  If a referral was made to a [facility] and you don't get a call from central scheduling please call [phone].  If a Mammogram was ordered for you at [facility] call [phone] to schedule or change your appointment.  If you had an XRay/CT/Ultrasound/MRI ordered the number is [phone] to schedule or change your radiology appointment.   Medical Concerns:  If you have urgent medical concerns please call [phone] at any time of the day.  If you have a medical emergency please call [phone].            Follow-ups after your visit        Who to contact     Please call your clinic at [phone] to:    Ask questions about your health    Make or cancel appointments    Discuss your medicines    Learn about your test results    Speak to your doctor            Additional Information About Your Visit        MyChart Information     [employer] is an electronic gateway that provides easy, online access to your medical records. With [employer], you can request a clinic appointment, read your test results, renew a prescription or communicate with your care team.     To sign up for [employer] visit the website at [email or web address]   You will be asked to enter the access code listed below, as well as some personal information. Please follow the directions to create your username and password.     Your access code is: [email or web address]  Expires: 2018  2:56 PM     Your access code will  in 90 days. If you need help or a new code, please contact your [facility] or call [phone] for assistance.        Care EveryWhere ID     This is your Care EveryWhere ID. This could be used by other organizations to access your [place] medical records  [id number]        Your Vitals Were     Pulse Temperature Last Period Pulse Oximetry Breastfeeding? BMI (Body Mass Index)    81 98.3  F (36.8  C) (Oral) 2018 100% Yes  27.92 kg/m2       Blood Pressure from Last 3 Encounters:   05/24/18 115/69   05/08/18 119/69   05/11/17 106/68    Weight from Last 3 Encounters:   05/24/18 161 lb 6.4 oz (73.2 kg)   05/08/18 164 lb (74.4 kg)   05/11/17 148 lb (67.1 kg)              Today, you had the following     No orders found for display         Today's Medication Changes          These changes are accurate as of 5/24/18  1:27 PM.  If you have any questions, ask your nurse or doctor.               Start taking these medicines.        Dose/Directions    doxylamine 25 MG Tabs tablet   Commonly known as:  UNISOM   Used for:  Nausea and vomiting in pregnancy   Started by:  Emily Mccray APRN CNP        Dose:  25 mg   Take 1 tablet (25 mg) by mouth At Bedtime   Quantity:  28 each   Refills:  2       pyridOXINE 25 MG tablet   Commonly known as:  vitamin B-6   Used for:  Nausea and vomiting in pregnancy   Started by:  Emily Mccray APRN CNP        Dose:  25 mg   Take 1 tablet (25 mg) by mouth daily   Quantity:  30 tablet   Refills:  2         These medicines have changed or have updated prescriptions.        Dose/Directions    * acetaminophen 500 MG tablet   Commonly known as:  TYLENOL   This may have changed:  Another medication with the same name was added. Make sure you understand how and when to take each.   Used for:  Neck pain   Changed by:  Emily Mccray APRN CNP        Dose:  500-1000 mg   Take 1-2 tablets (500-1,000 mg) by mouth every 6 hours as needed for mild pain   Quantity:  100 tablet   Refills:  0       * acetaminophen 500 MG tablet   Commonly known as:  TYLENOL   This may have changed:  You were already taking a medication with the same name, and this prescription was added. Make sure you understand how and when to take each.   Used for:  Pregnancy headache in first trimester   Changed by:  Emily Mccray APRN CNP        Dose:  1000 mg   Take 2 tablets (1,000 mg) by mouth every 6 hours as needed for mild  pain   Quantity:  100 tablet   Refills:  0       prenatal multivitamin  with iron 28-0.8 MG Tabs   This may have changed:  Another medication with the same name was removed. Continue taking this medication, and follow the directions you see here.   Used for:  Pregnancy test positive   Changed by:  Emily Mccray APRN CNP        Dose:  1 tablet   Take 1 tablet by mouth daily   Quantity:  90 each   Refills:  2       * Notice:  This list has 2 medication(s) that are the same as other medications prescribed for you. Read the directions carefully, and ask your doctor or other care provider to review them with you.      Stop taking these medicines if you haven't already. Please contact your care team if you have questions.     hydrocortisone valerate 0.2 % ointment   Commonly known as:  WEST-GABRIEL   Stopped by:  Emily Mccray APRN CNP           triamcinolone 0.5 % cream   Commonly known as:  KENALOG   Stopped by:  Emily Mccray APRN CNP                Where to get your medicines      These medications were sent to [address], [facility] [address]  [address] [place] [address]     Phone:  [phone]     acetaminophen 500 MG tablet    doxylamine 25 MG Tabs tablet    pyridOXINE 25 MG tablet                Primary Care Provider Office Phone # Fax #    Ying Mahan [phone]       [address] [address]        Equal Access to Services     [facility]: Hadii eli grande [employer], waaxda luqadaha, qaybta kaalmada slimyaaaron, clemencia friedman . So [facility] [phone].    ATENCIÓN: Si habla español, tiene a ravi disposición servicios gratuitos de asistencia lingüística. Raghav diamond [phone].    We comply with applicable federal civil rights laws and [place] laws. We do not discriminate on the basis of race, color, national origin, age, disability, sex, sexual  orientation, or gender identity.            Thank you!     Thank you for choosing [facility]  for your care. Our goal is always to provide you with excellent care. Hearing back from our patients is one way we can continue to improve our services. Please take a few minutes to complete the written survey that you may receive in the mail after your visit with us. Thank you!             Your Updated Medication List - Protect others around you: Learn how to safely use, store and throw away your medicines at www.disposemymeds.org.          This list is accurate as of 5/24/18  1:27 PM.  Always use your most recent med list.                   Brand Name Dispense Instructions for use Diagnosis    * acetaminophen 500 MG tablet    TYLENOL    100 tablet    Take 1-2 tablets (500-1,000 mg) by mouth every 6 hours as needed for mild pain    Neck pain       * acetaminophen 500 MG tablet    TYLENOL    100 tablet    Take 2 tablets (1,000 mg) by mouth every 6 hours as needed for mild pain    Pregnancy headache in first trimester       doxylamine 25 MG Tabs tablet    UNISOM    28 each    Take 1 tablet (25 mg) by mouth At Bedtime    Nausea and vomiting in pregnancy       prenatal multivitamin  with iron 28-0.8 MG Tabs     90 each    Take 1 tablet by mouth daily    Pregnancy test positive       pyridOXINE 25 MG tablet    vitamin B-6    30 tablet    Take 1 tablet (25 mg) by mouth daily    Nausea and vomiting in pregnancy       vitamin D 2000 units tablet     100 tablet    Take 2 tablets by mouth daily    Vitamin D deficiency       * Notice:  This list has 2 medication(s) that are the same as other medications prescribed for you. Read the directions carefully, and ask your doctor or other care provider to review them with you.

## 2018-06-05 ENCOUNTER — RADIANT APPOINTMENT (OUTPATIENT)
Dept: ULTRASOUND IMAGING | Facility: CLINIC | Age: 38
End: 2018-06-05
Attending: FAMILY MEDICINE
Payer: COMMERCIAL

## 2018-06-05 DIAGNOSIS — Z32.01 POSITIVE PREGNANCY TEST: ICD-10-CM

## 2018-06-05 PROCEDURE — 76801 OB US < 14 WKS SINGLE FETUS: CPT

## 2018-06-06 ENCOUNTER — PATIENT OUTREACH (OUTPATIENT)
Dept: FAMILY MEDICINE | Facility: CLINIC | Age: 38
End: 2018-06-06

## 2018-06-12 ENCOUNTER — TRANSFERRED RECORDS (OUTPATIENT)
Dept: HEALTH INFORMATION MANAGEMENT | Facility: CLINIC | Age: 38
End: 2018-06-12

## 2018-07-10 NOTE — TELEPHONE ENCOUNTER
Inform patient: You will see two providers throughout your prenatal care. One of them may be a male, will that be a problem for you? Yes    Additional patient comments: Patient is only seeing Emily Shazia for her initial OB because her work schedule did not work with her [facility]. Patient understands that she needs to see her assigned OB partners (primarily Dr. Mahan) for her future OB care.    Dating US scheduled: Completed  NOB scheduled 7/17 with Shazia (this will be their primary OB provider).    Kenna So

## 2018-07-17 ENCOUNTER — OFFICE VISIT (OUTPATIENT)
Dept: FAMILY MEDICINE | Facility: CLINIC | Age: 38
End: 2018-07-17
Payer: COMMERCIAL

## 2018-07-17 VITALS
SYSTOLIC BLOOD PRESSURE: 98 MMHG | RESPIRATION RATE: 16 BRPM | BODY MASS INDEX: 28.61 KG/M2 | HEART RATE: 90 BPM | OXYGEN SATURATION: 100 % | DIASTOLIC BLOOD PRESSURE: 59 MMHG | WEIGHT: 165.4 LBS

## 2018-07-17 DIAGNOSIS — Z32.01 PREGNANCY TEST POSITIVE: ICD-10-CM

## 2018-07-17 DIAGNOSIS — O09.529 SUPERVISION OF HIGH-RISK PREGNANCY OF ELDERLY MULTIGRAVIDA: Primary | ICD-10-CM

## 2018-07-17 LAB
ABO + RH BLD: NORMAL
ABO + RH BLD: NORMAL
BACTERIA: NORMAL
BILIRUBIN UR: NEGATIVE
BLD GP AB SCN SERPL QL: NORMAL
BLOOD BANK CMNT PATIENT-IMP: NORMAL
BLOOD UR: NEGATIVE
CLUE CELLS: NORMAL
ERYTHROCYTE [DISTWIDTH] IN BLOOD BY AUTOMATED COUNT: 14.1 % (ref 10–15)
GLUCOSE URINE: NEGATIVE
HCT VFR BLD AUTO: 34.5 % (ref 35–47)
HGB BLD-MCNC: 11.6 G/DL (ref 11.7–15.7)
KETONES UR QL: NEGATIVE
LEUKOCYTE ESTERASE UR: NEGATIVE
MCH RBC QN AUTO: 29.1 PG (ref 26.5–33)
MCHC RBC AUTO-ENTMCNC: 33.6 G/DL (ref 31.5–36.5)
MCV RBC AUTO: 87 FL (ref 78–100)
MOTILE TRICHOMONAS: NEGATIVE
NITRITE UR QL STRIP: NEGATIVE
ODOR: NORMAL
PH UR STRIP: 7 [PH] (ref 5–7)
PH WET PREP: <4.5
PLATELET # BLD AUTO: 305 10E9/L (ref 150–450)
PROTEIN UR: NEGATIVE
RBC # BLD AUTO: 3.98 10E12/L (ref 3.8–5.2)
SP GR UR STRIP: 1.01
SPECIMEN EXP DATE BLD: NORMAL
UROBILINOGEN UR STRIP-ACNC: NORMAL
WBC # BLD AUTO: 7 10E9/L (ref 4–11)
WBC WET PREP: NORMAL
YEAST: NORMAL

## 2018-07-17 RX ORDER — SWAB
1 SWAB, NON-MEDICATED MISCELLANEOUS DAILY
Qty: 90 EACH | Refills: 2 | Status: SHIPPED | OUTPATIENT
Start: 2018-07-17 | End: 2019-07-09

## 2018-07-17 NOTE — MR AVS SNAPSHOT
After Visit Summary   7/17/2018    Julio Cesar Cha    MRN: [id number]           Patient Information     Date Of Birth          1980        Visit Information        Provider Department      7/17/2018 1:20 PM Emily Mccray APRN CNP [facility]        Today's Diagnoses     Supervision of high-risk pregnancy of elderly multigravida    -  1    Pregnancy test positive          Care Instructions    Thank you for coming to [facility]!  - If you had lab testing today and your results are normal they will be be mailed to you or sent to your MyChart within seven days.   - If the lab tests need quick action we will call you with the results.  - The phone number we will call with results is # [phone] (home) . If this is not the best number please call our clinic and change the number.  - If any referrals were made to [facility] and you don't get a call, call central scheduling [phone]  - If you need any refills please call your pharmacy and they will contact us.  - If you had an XRay/CT/Ultrasound/MRI ordered the number is [phone] to schedule or change your appointment  - If you have any concerns about today's visit or wish to schedule another appointment please call our office [phone] (8-5:00 M-F)  - If you have urgent medical concerns call [phone] at any time of the day.  - If you have a medical emergency please call [phone].    Because you are pregnant, we have additional resources for you:  - You may call and ask to speak with one of our clinic nurses at [phone] during normal business hours, for non-urgent questions about your pregnancy.  - Immediate OB help is also available 24 hours a day, seven days a week via the [facility] - [phone]  located at [address] [place] [id number]    Prenatal Reminders:  Before 14 weeks: dating ultrasound       This ultrasound helps us  determine your dates accurately.  15 - 18 weeks: Optional genetic testing (quad screen)       This testing helps understand your baby's risk for some genetic abnormalities.  22 - 24 weeks: Ultrasound (fetal anatomy survey)       This testing will look for early growth abnormalities, and might tell the baby's sex.  24 - 28 weeks: One hour sugar test (GCT)        This test helps identify diabetes of pregnancy.  27 - 36 weeks: Tetanus shot (Tdap)       This shot helps protect you and your baby from tetanus and whooping cough.  36 weeks and later: Group B Strep test (GBS)       This test helps predict if you need antibiotics in labor to prevent infection in your baby.  Anytime September to April: flu shot       This shot helps protect you and your family from the flu.  This is especially important during pregnancy!  Once every trimester: blood iron test (hemoglobin)       This test helps us know if you will need extra iron to support your baby.  At any time during or after your pregnancy: Some women experience baby blues.  We can help you with:  ? Feeling anxious, ? Overwhelmed or sad       ? Trouble sleeping ? Crying uncontrollably     ? Trouble caring for yourself or baby.  How frequently should you see your provider?  < 28 weeks: every 4 weeks  28-36 weeks: every 2 weeks  >36 weeks: every week                    Follow-ups after your visit        Who to contact     Please call your clinic at [phone] to:    Ask questions about your health    Make or cancel appointments    Discuss your medicines    Learn about your test results    Speak to your doctor            Additional Information About Your Visit        [employer] Information     [employer] is an electronic gateway that provides easy, online access to your medical records. With [employer], you can request a clinic appointment, read your test results, renew a prescription or communicate with your care team.     To sign up for [employer] visit the website at  [email or web address]   You will be asked to enter the access code listed below, as well as some personal information. Please follow the directions to create your username and password.     Your access code is: [email or web address]  Expires: 2018  2:56 PM     Your access code will  in 90 days. If you need help or a new code, please contact your [facility] or call [phone] for assistance.        Care EveryWhere ID     This is your Care EveryWhere ID. This could be used by other organizations to access your [place] medical records  [id number]        Your Vitals Were     Pulse Respirations Last Period Pulse Oximetry BMI (Body Mass Index)       90 16 2018 (Exact Date) 100% 28.61 kg/m2        Blood Pressure from Last 3 Encounters:   18 98/59   18 115/69   18 119/69    Weight from Last 3 Encounters:   18 165 lb 6.4 oz (75 kg)   18 161 lb 6.4 oz (73.2 kg)   18 164 lb (74.4 kg)              We Performed the Following     ABO/Rh type and screen     CBC with platelets     Chlamydia trachomatis PCR     Hepatitis B Surface Antibody     Hepatitis B surface antigen     HIV Antigen Antibody Combo     HPV High Risk Types DNA Cervical     Neisseria gonorrhoeae PCR     Pap imaged thin layer screen with HPV - recommended age 30 - 65 years (select HPV below)     Rubella Antibody IgG Quantitative     Treponema Abs w Reflex to RPR and Titer     Urinalysis(LabDAQ)     Urine Culture Aerobic Bacterial     Wet Prep ([facility])          Today's Medication Changes          These changes are accurate as of 18  2:24 PM.  If you have any questions, ask your nurse or doctor.               These medicines have changed or have updated prescriptions.        Dose/Directions    acetaminophen 500 MG tablet   Commonly known as:  TYLENOL   This may have changed:  Another medication with the same name was removed. Continue taking this medication, and follow the  directions you see here.   Used for:  Pregnancy headache in first trimester   Changed by:  Emily Mccray APRN CNP        Dose:  1000 mg   Take 2 tablets (1,000 mg) by mouth every 6 hours as needed for mild pain   Quantity:  100 tablet   Refills:  0         Stop taking these medicines if you haven't already. Please contact your care team if you have questions.     doxylamine 25 MG Tabs tablet   Commonly known as:  UNISOM   Stopped by:  Emily Mccray APRN CNP           pyridOXINE 25 MG tablet   Commonly known as:  vitamin B-6   Stopped by:  Emily Mccray APRN CNP           vitamin D 2000 units tablet   Stopped by:  Emily Mccray APRN CNP                Where to get your medicines      These medications were sent to [place] [id number] [facility], [facility]  [address] [facility], [address] [address]     Phone:  [phone]     prenatal multivitamin  with iron 28-0.8 MG Tabs                Primary Care Provider Office Phone # Fax #    Ying Mahan [phone]       [address] [address]        Equal Access to Services     [facility] : Hadii eli gandhi hadasho Sojonathanali, waaxda luqadaha, qaybta kaalmada ademaryyaaaron, clemencia blum. So [facility] [phone].    ATENCIÓN: Si habla español, tiene a ravi disposición servicios gratuitos de asistencia lingüística. Raghav al [phone].    We comply with applicable federal civil rights laws and [place] laws. We do not discriminate on the basis of race, color, national origin, age, disability, sex, sexual orientation, or gender identity.            Thank you!     [facility]  [facility] your care. Our goal is always to provide you with excellent care. Hearing back from our patients is one way we can continue to improve our services. Please take a few minutes to complete the written survey that you may  receive in the mail after your visit with us. Thank you!             Your Updated Medication List - Protect others around you: Learn how to safely use, store and throw away your medicines at www.disposemymeds.org.          This list is accurate as of 7/17/18  2:24 PM.  Always use your most recent med list.                   Brand Name Dispense Instructions for use Diagnosis    acetaminophen 500 MG tablet    TYLENOL    100 tablet    Take 2 tablets (1,000 mg) by mouth every 6 hours as needed for mild pain    Pregnancy headache in first trimester       prenatal multivitamin  with iron 28-0.8 MG Tabs     90 each    Take 1 tablet by mouth daily    Pregnancy test positive

## 2018-07-17 NOTE — LETTER
July 25, 2018      Julio Cesar Neil Cha  [address] [facility] [address]        Dear Julio Cesar,    Thank you for getting your care at [facility]. Please see below for your test results.    Your pap smear and HPV (human papilloma virus) tests were normal and reassuring.     Your next pap smear and HPV tests are due in 5 years.   Resulted Orders   Urinalysis(LabDAQ)   Result Value Ref Range    Specific Gravity Urine 1.010 1.005 - 1.030    pH Urine 7.0 4.5 - 8.0    Leukocyte Esterase UR Negative NEGATIVE    Nitrite Urine Negative NEGATIVE    Protein UR Negative NEGATIVE    Glucose Urine Negative NEGATIVE    Ketones Urine Negative NEGATIVE    Urobilinogen mg/dL 0.2 E.U./dL 0.2 E.U./dL    Bilirubin UR Negative NEGATIVE    Blood UR Negative NEGATIVE   Urine Culture Aerobic Bacterial   Result Value Ref Range    Specimen Description Midstream Urine     Special Requests Specimen received in preservative     Culture Micro No growth    ABO/Rh type and screen   Result Value Ref Range    ABO O     RH(D) Pos     Antibody Screen Neg     Test Valid Only At          [facility]    Specimen Expires 07/20/2018    CBC with platelets   Result Value Ref Range    WBC 7.0 4.0 - 11.0 10e9/L    RBC Count 3.98 3.8 - 5.2 10e12/L    Hemoglobin 11.6 (L) 11.7 - 15.7 g/dL    Hematocrit 34.5 (L) 35.0 - 47.0 %    MCV 87 78 - 100 fl    MCH 29.1 26.5 - 33.0 pg    MCHC 33.6 31.5 - 36.5 g/dL    RDW 14.1 10.0 - 15.0 %    Platelet Count 305 150 - 450 10e9/L   Hepatitis B surface antigen   Result Value Ref Range    Hep B Surface Agn Nonreactive NR^Nonreactive   Hepatitis B Surface Antibody   Result Value Ref Range    Hepatitis B Surface Antibody 106.63 (H) <8.00 m[IU]/mL      Comment:      Reactive, Patient is considered to be immune to infection with hepatitis B   when the value is greater than or equal to 12.0 m[IU]/mL.     Treponema Abs w Reflex to RPR and Titer   Result Value Ref Range     Treponema Antibodies Nonreactive NR^Nonreactive   HIV Antigen Antibody Combo   Result Value Ref Range    HIV Antigen Antibody Combo Nonreactive NR^Nonreactive          Comment:      HIV-1 p24 Ag & HIV-1/HIV-2 Ab Not Detected   Rubella Antibody IgG Quantitative   Result Value Ref Range    Rubella Antibody IgG Quantitative 46 IU/mL      Comment:      Positive.  Suggests previous exposure or immunization and probable immunity  Reference Range:    Unvaccinated Negative 0-7 IU/mL  Vaccinated or previous exposure Positive 10 IU/ml or greater     Chlamydia trachomatis PCR   Result Value Ref Range    Specimen Description Urine     Chlamydia Trachomatis PCR Negative NEG^Negative      Comment:      Negative for C. trachomatis rRNA by transcription mediated amplification.  A negative result by transcription mediated amplification does not preclude   the presence of C. trachomatis infection because results are dependent on   proper and adequate collection, absence of inhibitors, and sufficient rRNA to   be detected.     Neisseria gonorrhoeae PCR   Result Value Ref Range    Specimen Descrip Urine     N Gonorrhea PCR Negative NEG^Negative      Comment:      Negative for N. gonorrhoeae rRNA by transcription mediated amplification.  A negative result by transcription mediated amplification does not preclude   the presence of N. gonorrhoeae infection because results are dependent on   proper and adequate collection, absence of inhibitors, and sufficient rRNA to   be detected.     Pap imaged thin layer screen with HPV - recommended age 30 - 65 years (select HPV below)   Result Value Ref Range    PAP NIL     Copath Report         Acc#: [id number]   Signed: 7/19/2018 08:33   MR#: [id number]    SPECIMEN/STAIN PROCESS:  Pap imaged thin layer prep screening (Surepath, FocalPoint with guided   screening)       Pap-Cyto x 1, HPV ordered x 1    SOURCE: Cervical, endocervical  ----------------------------------------------------------------    Pap imaged thin layer prep screening (Surepath, FocalPoint with guided   screening)  SPECIMEN ADEQUACY:  Satisfactory for evaluation.  -Transformation zone component present.    CYTOLOGIC INTERPRETATION:    Negative for intraepithelial lesion or malignancy    Electronically signed by:  Willie MONTENEGRO, (ASCP)    CLINICAL HISTORY:  LMP: 4/8/2018  Pregnant, Previous normal pap  Date of Last Pap: 7/31/2014,    Papanicolaou Test Limitations:  Cervical cytology is a screening test with   limited sensitivity; regular  screening is critical for cancer prevention; Pap tests are primarily   effective for the diagnosis/prevention of  squamous cell carcinoma, not adenocarcinomas  or other cancers.  TESTING LAB LOCATION:  [address] [address], [phone]  Processed and screened at [facility]     HPV High Risk Types DNA Cervical   Result Value Ref Range    HPV Source SurePath     HPV 16 DNA Negative NEG^Negative    HPV 18 DNA Negative NEG^Negative    Other HR HPV Negative NEG^Negative    Final Diagnosis This patient's sample is negative for HPV DNA.       Comment:      This test was developed and its performance characteristics determined by the   [facility], [facility]. It   has not been cleared or approved by the FDA. The laboratory is regulated under   CLIA as qualified to perform high-complexity testing. This test is used for   clinical purposes. It should not be regarded as investigational or for   research.  (Note)  METHODOLOGY:  The Roche heath 4800 system uses automated extraction,   simultaneous amplification of HPV (L1 region) and beta-globin,    followed by  real time detection of fluorescent labeled HPV and beta   globin using specific oligonucleotide probes . The test specifically   identifies types HPV 16 DNA and  HPV 18 DNA while concurrently   detecting the rest of the high risk types (31, 33, 35, 39, 45, 51,   52, 56, 58, 59, 66 or 68).  COMMENTS:  This test is not intended for use as a screening device   for women under age 30 with normal cervical cytology.  Results should   be correl  ated with cytologic and histologic findings. Close clinical   followup is recommended.      Specimen Description Cervical Cells       Comment:      [id number]   Wet Prep ([facility])   Result Value Ref Range    Yeast Wet Prep None none    Motile Trichomonas Wet Prep Negative Negative    Clue Cells Wet Prep Present <20% NONE    WBC WET PREP 2-5 2 - 5    Bacteria Wet Prep Few None    pH Wet Prep <4.5 3.8 - 4.5    Odor Wet Prep None NONE       If you have any concerns about these results please call and leave a message for me or send a MyChart message to the clinic.    Sincerely,    MICHELLE Hatch CNP

## 2018-07-17 NOTE — PATIENT INSTRUCTIONS
Thank you for coming to [facility]!  - If you had lab testing today and your results are normal they will be be mailed to you or sent to your MyChart within seven days.   - If the lab tests need quick action we will call you with the results.  - The phone number we will call with results is # [phone] (home) . If this is not the best number please call our clinic and change the number.  - If any referrals were made to [facility] and you don't get a call, call central scheduling [phone]  - If you need any refills please call your pharmacy and they will contact us.  - If you had an XRay/CT/Ultrasound/MRI ordered the number is [phone] to schedule or change your appointment  - If you have any concerns about today's visit or wish to schedule another appointment please call our office [phone] (8-5:00 M-F)  - If you have urgent medical concerns call [phone] at any time of the day.  - If you have a medical emergency please call [phone].    Because you are pregnant, we have additional resources for you:  - You may call and ask to speak with one of our clinic nurses at [phone] during normal business hours, for non-urgent questions about your pregnancy.  - Immediate OB help is also available 24 hours a day, seven days a week via the [facility] - [phone]  located at [address] [place], [place] [id number]    Prenatal Reminders:  Before 14 weeks: dating ultrasound       This ultrasound helps us determine your dates accurately.  15 - 18 weeks: Optional genetic testing (quad screen)       This testing helps understand your baby's risk for some genetic abnormalities.  22 - 24 weeks: Ultrasound (fetal anatomy survey)       This testing will look for early growth abnormalities, and might tell the baby's sex.  24 - 28 weeks: One hour sugar test (GCT)        This test helps identify diabetes of pregnancy.  27 - 36 weeks: Tetanus shot  (Tdap)       This shot helps protect you and your baby from tetanus and whooping cough.  36 weeks and later: Group B Strep test (GBS)       This test helps predict if you need antibiotics in labor to prevent infection in your baby.  Anytime September to April: flu shot       This shot helps protect you and your family from the flu.  This is especially important during pregnancy!  Once every trimester: blood iron test (hemoglobin)       This test helps us know if you will need extra iron to support your baby.  At any time during or after your pregnancy: Some women experience baby blues.  We can help you with:  ? Feeling anxious, ? Overwhelmed or sad       ? Trouble sleeping ? Crying uncontrollably     ? Trouble caring for yourself or baby.  How frequently should you see your provider?  < 28 weeks: every 4 weeks  28-36 weeks: every 2 weeks  >36 weeks: every week

## 2018-07-17 NOTE — PROGRESS NOTES
First Obstetric Visit       HPI       Julio Cesar Cha is a 38 year old woman who presents for an initial prenatal visit at Unknown weeks of pregnancy with ELZA of Dec 15, 2018 by LMP of Patient's last menstrual period was 2018 (exact date)..    3 days of spotting with LMP.    LMP and dating ultrasound does not correlate - likely implantation bleeding.          She has not had bleeding since her LMP.   She has had mild nausea - certain foods, smells. This has improved.   Unisom/Vitamin B6 was not helpful.  Was taking apart.   Weight loss has occurred.    This was a planned pregnancy.     Pre-pregnancy weight:  165 lbs      OTHER CONCERNS:     Headaches have improved, now resolved.             Labor Risk Assessment     Is the patient's age <18 or >40?     Yes   Patint's BMI is Body mass index is 28.61 kg/(m^2).   Does patient have a BMI < 18.5?     No  Prior delivery within 6 months?      No  Ever delivered prior to 37 weeks gestation?  No  Pregnancy occur via In Vitro Fertilization?   No  Are you carrying twins?       No    The patient has the following additional risk factors for  labor:   none     Labor Risk Summary:  Pt is high risk for  Labor  No             Past Medical History     Past Medical History:   Diagnosis Date     Anemia 2012       Do you have a history of any of the following medical conditions?    Condition No/Yes/Details   Hypertension No    Heart disease, mitral valve prolapse, rheumatic fever No    Asthma or another chronic lung disease No    An autoimmune disorder No    Kidney disease No    Frequent urinary tract infections No    Migraine headaches  YES - unilateral headaches with early pregnancy, now resolved   Stroke, loss of sensation/function, seizures, or other neuro problem No    Diabetes No    Thyroid problems or have you taken thyroid medication No    Hepatitis, liver disease, jaundice No    Blood clots, phlebitis, pulmonary embolism  or varicose veins No   Excessive bleeding after surgery or dental work No    Heavy menstrual periods requiring treatment No    Anemia  YES during previous pregnancies   Blood transfusions No         Would you refuse a blood transfusion? No    Breast problems No    Abnormalities of the uterus No    Abnormal pap smear No    Have you been treated for an emotional disturbance? No    Have you been physically, sexually, or emotionally hurt by someone? No    Have you been in a major accident or suffered serious trauma? No    Have you had surgical procedures?  YES - right ankle fracture with ORIF        Have you ever had any complications from anesthesia? No    Have you ever been hospitalized for a nonsurgical reason? No                Genetic Screening     Do you have a history of any of the following No/Yes/Details        A metabolic disorder (e.g. Insulin-dependent DM, PKU) No         Recurrent pregnancy loss No      Do you, the baby's father, or anyone in your families have          Thalassemia AND MCV <80 No         Hemophilia No         Neural tube defect No }        Congenital heart defect No         Sickle cell disease or trait No         Muscular dystrophy No         Cystic fibrosis No         Mental retardation or autism No         Down's syndrome No         Forest-Sach's disease No         Bottineau's chorea No         Any other inherited genetic or chromosomal disorder No         A child with birth defects not listed above No                Infection History     At high risk for coming in contact with HIV No    Ever treated for tuberculosis  YES treatment for latent tuberculosis in    Ever received the BCG vaccine for tuberculosis  YES    Ever had genital herpes (or has your partner) No    Had a rash or viral illness since LMP No    Ever had a sexually transmitted infection No    Ever had chicken pox or the vaccine  YES as a child   Ever had any other serious infectious disease No    Up to date with  immunizations Yes            Habits     Have you ever used tobacco products (cigarettes, chewing tobacco)? No, Do you currently use tobacco products? No. , Have you ever used alcohol? No, Have you ever used any other drugs? No         Current Medications      Current Outpatient Prescriptions   Medication Sig Dispense Refill     acetaminophen (TYLENOL) 500 MG tablet Take 1-2 tablets (500-1,000 mg) by mouth every 6 hours as needed for mild pain 100 tablet 0     Cholecalciferol (VITAMIN D) 2000 UNITS tablet Take 2 tablets by mouth daily 100 tablet 3     Prenatal Vit-Fe Fumarate-FA (PRENATAL MULTIVITAMIN  WITH IRON) 28-0.8 MG TABS Take 1 tablet by mouth daily 90 each 2     pyridOXINE (VITAMIN B-6) 25 MG tablet Take 1 tablet (25 mg) by mouth daily 30 tablet 2     acetaminophen (TYLENOL) 500 MG tablet Take 2 tablets (1,000 mg) by mouth every 6 hours as needed for mild pain 100 tablet 0     doxylamine (UNISOM) 25 MG TABS tablet Take 1 tablet (25 mg) by mouth At Bedtime (Patient not taking: Reported on 7/17/2018) 28 each 2             Review of Systems      normal menstrual cycles, negative for, sexually transmitted disease, urinary tract infection, vaginal discharge and bleeding  ====================================================           Physical Exam      BP 98/59  Pulse 90  Resp 16  Wt 165 lb 6.4 oz (75 kg)  LMP 04/08/2018 (Exact Date)  SpO2 100%  BMI 28.61 kg/m2    GENERAL: healthy, alert and no distress  NECK: no tenderness, no adenopathy, no asymmetry, no masses, no stiffness; thyroid- normal to palpation  RESP: lungs clear to auscultation - no rales, no rhonchi, no wheezes  CV: regular rates and rhythm, normal S1 S2, no S3 or S4 and no murmur, no click or rub -  ABDOMEN: soft, no tenderness, no  hepatosplenomegaly, no masses, normal bowel sounds  MS: extremities- no gross deformities noted, no edema  - female: cervix- normal, adnexae- normal; uterus- normal, no masses, no discharge  FHT:  154  GENITALIA:   BUS WNL, no lesions noted  VAGINA:  Pink, normal rugae and discharge normal and physiologic  CERVIX:  smooth, without discharge or CMT and parous os, firm/ closed   UTERUS: nontender, 18 weeks in size.  ADNEXA:  Unable to assess  =========================================         Assessment and Plan     Julio Cesar was seen today for first ob.    Diagnoses and all orders for this visit:    Supervision of high-risk pregnancy of elderly multigravida  -     Urinalysis(LabDAQ)  -     Urine Culture Aerobic Bacterial  -     ABO/Rh type and screen  -     CBC with platelets  -     Hepatitis B surface antigen  -     Hepatitis B Surface Antibody  -     Treponema Abs w Reflex to RPR and Titer  -     HIV Antigen Antibody Combo  -     Rubella Antibody IgG Quantitative  -     Chlamydia trachomatis PCR  -     Neisseria gonorrhoeae PCR  -     Pap imaged thin layer screen with HPV - recommended age 30 - 65 years (select HPV below)  -     HPV High Risk Types DNA Cervical        38 year old  , 18 +3 weeks of pregnancy with ELZA of Dec 15, 2018 by dating ultrasound.      Pregnancy Risk Assessment: High Risk pregnancy  -Dating US obtained and dating confirmed?   YES  Taking PNV/Folate?     YES      - Ordered new OB labs: blood type and antibody screen, HIV, VDRL, hep B, rubella, UA/UC. Pap test, gonorrhea/chlamydia, wet prep done today.   - Discussed early genetic screening, patient is 18 +3 weeks gestation and was not interested in pursuing.  Counseled that patient was past testing window.    - Discussed screening for sickle cell anemia. Patient does not  want to pursue Hb electrophoresis.   - Prenatal vitamins ordered.     Counseling given:   - Follow up in 2 weeks for recheck, discuss results, discuss quad screen.   - Recommended weight gain for pregnancy: 15-25 lbs    - Instructed on best evidence for: healthy diet and foods to avoid; exercise and activity during pregnancy; avoiding exposure to toxoplasmosis; safe use of seatbelts  during pregnancy; and maintenance of a generally healthy lifestyle    Discussed the harms, benefits, side effects and alternative therapies for current prescribed and OTC medications.    Discussed high risk conditions as follows: advanced maternal age    Options for treatment and follow-up care were reviewed with the patient and/or guardian. Julio Cesar Cha and/or guardian engaged in the decision making process and verbalized understanding of the options discussed and agreed with the final plan.    Emily Mccray, MICHELLE CNP

## 2018-07-18 LAB
BACTERIA SPEC CULT: NO GROWTH
C TRACH DNA SPEC QL NAA+PROBE: NEGATIVE
HBV SURFACE AB SERPL IA-ACNC: 106.63 M[IU]/ML
HBV SURFACE AG SERPL QL IA: NONREACTIVE
HIV 1+2 AB+HIV1 P24 AG SERPL QL IA: NONREACTIVE
Lab: NORMAL
N GONORRHOEA DNA SPEC QL NAA+PROBE: NEGATIVE
RUBV IGG SERPL IA-ACNC: 46 IU/ML
SPECIMEN SOURCE: NORMAL
T PALLIDUM AB SER QL: NONREACTIVE

## 2018-07-19 LAB
COPATH REPORT: NORMAL
PAP: NORMAL

## 2018-07-23 LAB
FINAL DIAGNOSIS: NORMAL
HPV HR 12 DNA CVX QL NAA+PROBE: NEGATIVE
HPV16 DNA SPEC QL NAA+PROBE: NEGATIVE
HPV18 DNA SPEC QL NAA+PROBE: NEGATIVE
SPECIMEN DESCRIPTION: NORMAL
SPECIMEN SOURCE CVX/VAG CYTO: NORMAL

## 2018-08-03 ENCOUNTER — OFFICE VISIT (OUTPATIENT)
Dept: FAMILY MEDICINE | Facility: CLINIC | Age: 38
End: 2018-08-03
Payer: COMMERCIAL

## 2018-08-03 VITALS
OXYGEN SATURATION: 100 % | TEMPERATURE: 97.8 F | HEART RATE: 78 BPM | SYSTOLIC BLOOD PRESSURE: 100 MMHG | RESPIRATION RATE: 16 BRPM | BODY MASS INDEX: 28.2 KG/M2 | WEIGHT: 163 LBS | DIASTOLIC BLOOD PRESSURE: 64 MMHG

## 2018-08-03 DIAGNOSIS — O09.529 SUPERVISION OF HIGH-RISK PREGNANCY OF ELDERLY MULTIGRAVIDA: Primary | ICD-10-CM

## 2018-08-03 ASSESSMENT — ANXIETY QUESTIONNAIRES
3. WORRYING TOO MUCH ABOUT DIFFERENT THINGS: NOT AT ALL
7. FEELING AFRAID AS IF SOMETHING AWFUL MIGHT HAPPEN: NOT AT ALL
GAD7 TOTAL SCORE: 0
1. FEELING NERVOUS, ANXIOUS, OR ON EDGE: NOT AT ALL
5. BEING SO RESTLESS THAT IT IS HARD TO SIT STILL: NOT AT ALL
6. BECOMING EASILY ANNOYED OR IRRITABLE: NOT AT ALL
IF YOU CHECKED OFF ANY PROBLEMS ON THIS QUESTIONNAIRE, HOW DIFFICULT HAVE THESE PROBLEMS MADE IT FOR YOU TO DO YOUR WORK, TAKE CARE OF THINGS AT HOME, OR GET ALONG WITH OTHER PEOPLE: NOT DIFFICULT AT ALL
2. NOT BEING ABLE TO STOP OR CONTROL WORRYING: NOT AT ALL

## 2018-08-03 ASSESSMENT — PATIENT HEALTH QUESTIONNAIRE - PHQ9: 5. POOR APPETITE OR OVEREATING: NOT AT ALL

## 2018-08-03 NOTE — NURSING NOTE
Due to patient being non-English speaking/uses sign language, an  was used for this visit. Only for face-to-face interpretation by an external agency, date and length of interpretation can be found on the scanned worksheet.     name: Honey Arce  Agency: [employer]  Language: Chilean   Telephone number: [phone]  Type of interpretation: Face-to-face, spoken    Vilma Newman CMA

## 2018-08-03 NOTE — MR AVS SNAPSHOT
After Visit Summary   8/3/2018    Julio Cesar Cha    MRN: [id number]           Patient Information     Date Of Birth          1980        Visit Information        Provider Department      8/3/2018 4:20 PM Ying Mahan MD [facility]        Today's Diagnoses     Supervision of high-risk pregnancy of elderly multigravida    -  1       Follow-ups after your visit        Follow-up notes from your care team     Return in about 4 weeks (around 2018), or ZA, for fetal survey in 1-2 weeks.      Future tests that were ordered for you today     Open Future Orders        Priority Expected Expires Ordered    US OB 14 +WKS SINGLE OR FIRST GESTATION (IN CLINIC) Routine  8/3/2019 8/3/2018            Who to contact     Please call your clinic at [phone] to:    Ask questions about your health    Make or cancel appointments    Discuss your medicines    Learn about your test results    Speak to your doctor            Additional Information About Your Visit        MyChart Information     [employer] is an electronic gateway that provides easy, online access to your medical records. With [employer], you can request a clinic appointment, read your test results, renew a prescription or communicate with your care team.     To sign up for Lyftt visit the website at [email or web address]   You will be asked to enter the access code listed below, as well as some personal information. Please follow the directions to create your username and password.     Your access code is: [email or web address]  Expires: 2018  2:56 PM     Your access code will  in 90 days. If you need help or a new code, please contact your [facility] or call [phone] for assistance.        Care EveryWhere ID     This is your Care EveryWhere ID. This could be used by other organizations to access your [facility] medical records  [id number]        Your Vitals Were     Pulse  Temperature Respirations Last Period Pulse Oximetry BMI (Body Mass Index)    78 97.8  F (36.6  C) (Oral) 16 04/08/2018 (Exact Date) 100% 28.2 kg/m2       Blood Pressure from Last 3 Encounters:   08/03/18 100/64   07/17/18 98/59   05/24/18 115/69    Weight from Last 3 Encounters:   08/03/18 163 lb (73.9 kg)   07/17/18 165 lb 6.4 oz (75 kg)   05/24/18 161 lb 6.4 oz (73.2 kg)               Primary Care Provider Office Phone # Fax #    Ying Mahan [phone]       [address] [address]        Equal Access to Services     MELODIE BAUTISTA : Jabier Thomson, waamanda zacariasqadaha, qatorresta kaalmada reanna, clemencia friedman . So [facility] [phone].    ATENCIÓN: Si habla español, tiene a ravi disposición servicios gratuitos de asistencia lingüística. [facility] [phone].    We comply with applicable federal civil rights laws and [place] laws. We do not discriminate on the basis of race, color, national origin, age, disability, sex, sexual orientation, or gender identity.            Thank you!     Thank you for choosing hospitals [facility]  for your care. Our goal is always to provide you with excellent care. Hearing back from our patients is one way we can continue to improve our services. Please take a few minutes to complete the written survey that you may receive in the mail after your visit with us. Thank you!             Your Updated Medication List - Protect others around you: Learn how to safely use, store and throw away your medicines at www.disposemymeds.org.          This list is accurate as of 8/3/18  5:10 PM.  Always use your most recent med list.                   Brand Name Dispense Instructions for use Diagnosis    acetaminophen 500 MG tablet    TYLENOL    100 tablet    Take 2 tablets (1,000 mg) by mouth every 6 hours as needed for mild pain    Pregnancy headache in first trimester       prenatal multivitamin   with iron 28-0.8 MG Tabs     90 each    Take 1 tablet by mouth daily    Pregnancy test positive

## 2018-08-04 ASSESSMENT — PATIENT HEALTH QUESTIONNAIRE - PHQ9: SUM OF ALL RESPONSES TO PHQ QUESTIONS 1-9: 0

## 2018-08-04 ASSESSMENT — ANXIETY QUESTIONNAIRES: GAD7 TOTAL SCORE: 0

## 2018-08-04 NOTE — PROGRESS NOTES
Return OB visit  12-23 weeks    Subjective:   Julio Cesar is a 38 year old  female at 21w0d who returns for prenatal care. ELZA Dec 15, 2018.  - Concerns today: none   Fetal movement is present - for 1 week.   Reviewed results of prenatal labs    Objective:   /64  Pulse 78  Temp 97.8  F (36.6  C) (Oral)  Resp 16  Wt 163 lb (73.9 kg)  LMP 2018 (Exact Date)  SpO2 100%  BMI 28.2 kg/m2   Const: No distress  Abd: Gravid.   See flowsheet for FH, FHTs; FH measurement difficult because of large pannus    Prenatal labs:   -   Hemoglobin   Date Value Ref Range Status   2018 11.6 (L) 11.7 - 15.7 g/dL Final       Assessment & plan:   IUP at 21w0d weeks by first trimester ultrasound.     - Prenatal labs reviewed: normal  - Pregnancy complicated by: AMA, grand multiparity   Discussed and ordered ultrasound for fetal survey.   - Pregnancy weight gain has been -1 lb to date.   -The patient plans to deliver at [facility] with myself and/or OB partner.     - Patient will continue taking prenatal vitamins and avoiding cigarettes & alcohol.   - Return to clinic in 4 weeks.    - Specific concerns addressed today:   1. Supervision of high-risk pregnancy of elderly multigravida  -  OB 14 +WKS SINGLE OR FIRST GESTATION (IN CLINIC); Future    Options for treatment and follow-up care were reviewed with the patient and/or guardian. Julio Cesar Neil Cha and/or guardian engaged in the decision making process and verbalized understanding of the options discussed and agreed with the final plan.    Ying Mahan MD

## 2018-08-15 DIAGNOSIS — O09.529 SUPERVISION OF HIGH-RISK PREGNANCY OF ELDERLY MULTIGRAVIDA: ICD-10-CM

## 2018-08-15 NOTE — LETTER
2018      Julio Cesar Neil Cha  [address] [place] [address]        Dear Julio Cesar,    Thank you for getting your care at [facility]. Please see below for your test results. The US is reassuring    Resulted Orders   US OB 14 +WKS SINGLE OR FIRST GESTATION (IN CLINIC)    Narrative    2nd/3rd Trimester Ultrasound Report    CONCLUSIONS: US findings are consistent with previous US  EGA by Previous US 22w4d  ELZA by Previous US :Dec 15, 2018  EGA by this U/S: [id number] (+/- 7days): ELZA by this  U/S: 2018              .43g, +/-78.21g 1lb 2oz  Weight Percentile: 41.6%rina Rae  Follow up: Routine follow up as needed.  PCP: Ying Mahan  Physician/Sonographer: Millie Minor     Indications: Standard Exam and Fetal survey  Exam limited by fetal movement  LMP: Patient's last menstrual period was 2018 (exact date).       FHR: 151 BPM  Fluid: normal EVANGELIST: 15.50  Placenta Location: Posterior  Fetal number:1  Presentation: Variable  Technique: Transabdominal  Machine: Summon Pro 5    FETAL SURVEY                    Cerebellum                         Intracranial Anatomy          appears Normal   (Choroid Plexus, Cisterna Magna,   Lateral ventricles, Cerebellum Midline Falx,   Cavum Septi Pellucidi)        Heart: (4 chamber)    appears Normal  Heart:(out flow tracts)       appears Normal  Diaphragm                             appears Normal  Stomach                                appears Normal  Kidney: Right                      appears Normal                 Left                        appears Normal  Umbilical Cord: 3 vessels:  appears Normal                              Insertion:  appears Normal  Bladder                                 appears Normal  Spine: Cervical                     appears Normal              Thoracic                    appears Normal                L/S                             appears Normal  4 limbs visualized                  appears Normal    Sex Determination:male    MEASUREMENTS:(Hadlock)  BPD: 5.42 cm   22wks 3d    HC:    20.42 cm  22wks 4d     AC:    17.37 cm  22wks 2d   FL:     4.05 cm   23wks 1d    **Fetal anomalies may be present but not detected. **      Millie Minor RDMS, RVT  Attestation of Reviewer.  I reviewed the images and agree with with interpretation above.  Armando Ro MD         If you have any concerns about these results please call and leave a message for me or send a MessageGatet message to the clinic.    Sincerely,    Armando Ro MD

## 2018-08-15 NOTE — PROGRESS NOTES
Due to patient being non-English speaking/uses sign language, an  was used for this visit. Only for face-to-face interpretation by an external agency, date and length of interpretation can be found on the scanned worksheet.     name: Honey Arce  Agency: [employer]  Language: Luxembourger   Telephone number: not provided  Type of interpretation: Face-to-face, spoken

## 2018-08-20 NOTE — PROGRESS NOTES
Results for orders placed or performed in visit on 08/15/18   US OB 14 +WKS SINGLE OR FIRST GESTATION (IN CLINIC)    Narrative    2nd/3rd Trimester Ultrasound Report    CONCLUSIONS: US findings are consistent with previous US  EGA by Previous US 22w4d  ELZA by Previous US :Dec 15, 2018  EGA by this U/S: [id number] (+/- 7days): ELZA by this  U/S: 2018              .43g, +/-78.21g 1lb 2oz  Weight Percentile: 41.6%rina Rae  Follow up: Routine follow up as needed.  PCP: Ying Mahan  Physician/Sonographer: Millie Minor     Indications: Standard Exam and Fetal survey  Exam limited by fetal movement  LMP: Patient's last menstrual period was 2018 (exact date).       FHR: 151 BPM  Fluid: normal EVANGELIST: 15.50  Placenta Location: Posterior  Fetal number:1  Presentation: Variable  Technique: Transabdominal  Machine: Dindong Pro 5    FETAL SURVEY                    Cerebellum                         Intracranial Anatomy          appears Normal   (Choroid Plexus, Cisterna Magna,   Lateral ventricles, Cerebellum Midline Falx,   Cavum Septi Pellucidi)        Heart: (4 chamber)    appears Normal  Heart:(out flow tracts)       appears Normal  Diaphragm                             appears Normal  Stomach                                appears Normal  Kidney: Right                      appears Normal                 Left                        appears Normal  Umbilical Cord: 3 vessels:  appears Normal                              Insertion:  appears Normal  Bladder                                 appears Normal  Spine: Cervical                     appears Normal              Thoracic                    appears Normal                L/S                             appears Normal  4 limbs visualized                 appears Normal    Sex Determination:male    MEASUREMENTS:(Hadlock)  BPD: 5.42 cm   22wks 3d    HC:    20.42 cm  22wks 4d     AC:    17.37 cm  22wks 2d   FL:     4.05 cm   23wks  1d    **Fetal anomalies may be present but not detected. **      Millie Minor RDMS, RVT  Attestation of Reviewer.  I reviewed the images and agree with with interpretation above.  Armando Ro MD

## 2018-08-31 ENCOUNTER — OFFICE VISIT (OUTPATIENT)
Dept: FAMILY MEDICINE | Facility: CLINIC | Age: 38
End: 2018-08-31
Payer: COMMERCIAL

## 2018-08-31 VITALS
HEART RATE: 63 BPM | BODY MASS INDEX: 28.51 KG/M2 | DIASTOLIC BLOOD PRESSURE: 64 MMHG | TEMPERATURE: 98.1 F | WEIGHT: 164.8 LBS | OXYGEN SATURATION: 96 % | SYSTOLIC BLOOD PRESSURE: 99 MMHG

## 2018-08-31 DIAGNOSIS — O09.529 SUPERVISION OF HIGH-RISK PREGNANCY OF ELDERLY MULTIGRAVIDA: Primary | ICD-10-CM

## 2018-08-31 NOTE — MR AVS SNAPSHOT
After Visit Summary   8/31/2018    Julio Cesar Cha    MRN: [id number]           Patient Information     Date Of Birth          1980        Visit Information        Provider Department      8/31/2018 3:40 PM Ying Mahan MD [facility]         Follow-ups after your visit        Who to contact     Please call your clinic at [phone] to:    Ask questions about your health    Make or cancel appointments    Discuss your medicines    Learn about your test results    Speak to your doctor            Additional Information About Your Visit        Care EveryWhere ID     This is your Care EveryWhere ID. This could be used by other organizations to access your [facility] medical records  [id number]        Your Vitals Were     Pulse Temperature Last Period Pulse Oximetry BMI (Body Mass Index)       63 98.1  F (36.7  C) (Oral) 04/08/2018 (Exact Date) 96% 28.51 kg/m2        Blood Pressure from Last 3 Encounters:   08/31/18 99/64   08/03/18 100/64   07/17/18 98/59    Weight from Last 3 Encounters:   08/31/18 164 lb 12.8 oz (74.8 kg)   08/03/18 163 lb (73.9 kg)   07/17/18 165 lb 6.4 oz (75 kg)              Today, you had the following     No orders found for display       Primary Care Provider Office Phone # Fax #    Ying Mahan [phone]       [address] [address]        Equal Access to Services     [facility] AH: Hadii eli cantrello Sol[employer], waaxda luqadaha, qaybta kaalmada slimyada, waxsantos karthik edwards aleksmary zacharycaleb friedman . So [facility] [phone].    ATENCIÓN: Si habla español, tiene a ravi disposición servicios gratuitos de asistencia lingüística. Llame al [phone].    We comply with applicable federal civil rights laws and [place] laws. We do not discriminate on the basis of race, color, national origin, age, disability, sex, sexual orientation, or gender identity.            Thank you!     Thank you for  choosing [facility]  for your care. Our goal is always to provide you with excellent care. Hearing back from our patients is one way we can continue to improve our services. Please take a few minutes to complete the written survey that you may receive in the mail after your visit with us. Thank you!             Your Updated Medication List - Protect others around you: Learn how to safely use, store and throw away your medicines at www.disposemymeds.org.          This list is accurate as of 8/31/18  5:15 PM.  Always use your most recent med list.                   Brand Name Dispense Instructions for use Diagnosis    acetaminophen 500 MG tablet    TYLENOL    100 tablet    Take 2 tablets (1,000 mg) by mouth every 6 hours as needed for mild pain    Pregnancy headache in first trimester       prenatal multivitamin  with iron 28-0.8 MG Tabs     90 each    Take 1 tablet by mouth daily    Pregnancy test positive       VITAMIN D (CHOLECALCIFEROL) PO      Take by mouth daily

## 2018-08-31 NOTE — NURSING NOTE
Due to patient being non-English speaking/uses sign language, an  was used for this visit. Only for face-to-face interpretation by an external agency, date and length of interpretation can be found on the scanned worksheet.     name: rubi merchant  Agency: [employer]  Language: Sierra Leonean   Telephone number: [phone]  Type of interpretation: Face-to-face, spoken

## 2018-09-01 NOTE — PROGRESS NOTES
Return OB visit  24-28 weeks    Subjective:   Julio Cesar is a 38 year old  female at 25w0d who returns for prenatal care. ELZA Dec 15, 2018.  - Concerns today: spider veins uncomfortable behind knees  -Fetal movement is present.    Objective:   BP 99/64  Pulse 63  Temp 98.1  F (36.7  C) (Oral)  Wt 164 lb 12.8 oz (74.8 kg)  LMP 2018 (Exact Date)  SpO2 96%  BMI 28.51 kg/m2   Const: No distress  Abd: Gravid.   See flowsheet for FH, FHTs, edema.    Prenatal labs:   Hemoglobin   Date Value Ref Range Status   2018 11.6 (L) 11.7 - 15.7 g/dL Final       Assessment & plan:   IUP at 25w0d weeks by LMP consistent with first trimester ultrasound.       - Prenatal labs reviewed. Patient is Rh pos: antibody re-screen and rhogam is therefore not indicated.   - Reviewed fetal survey results with patient: another boy. Repeat ultrasound is not recommended.   - Discussed screening for gestational diabetes. 1 hour GCT will be obtained next visit.   - Pregnancy weight gain has been 12.8 oz (0.363 kg) to date, which is adequate.   - Patient will continue taking prenatal vitamins and avoiding cigarettes & alcohol.    - Return to clinic in 4 weeks.     -Options for treatment and follow-up care were reviewed with the patient and/or guardian. Julio Cesar Cha and/or guardian engaged in the decision making process and verbalized understanding of the options discussed and agreed with the final plan.    Ying Mahan MD

## 2018-09-20 ENCOUNTER — OFFICE VISIT (OUTPATIENT)
Dept: FAMILY MEDICINE | Facility: CLINIC | Age: 38
End: 2018-09-20
Payer: COMMERCIAL

## 2018-09-20 VITALS
WEIGHT: 159 LBS | TEMPERATURE: 97.4 F | SYSTOLIC BLOOD PRESSURE: 96 MMHG | RESPIRATION RATE: 16 BRPM | DIASTOLIC BLOOD PRESSURE: 57 MMHG | HEART RATE: 76 BPM | OXYGEN SATURATION: 100 % | BODY MASS INDEX: 27.51 KG/M2

## 2018-09-20 DIAGNOSIS — L30.9 ECZEMA, UNSPECIFIED TYPE: ICD-10-CM

## 2018-09-20 DIAGNOSIS — L29.9 ITCHING: Primary | ICD-10-CM

## 2018-09-20 DIAGNOSIS — Z23 NEED FOR PROPHYLACTIC VACCINATION AND INOCULATION AGAINST INFLUENZA: ICD-10-CM

## 2018-09-20 PROCEDURE — 90471 IMMUNIZATION ADMIN: CPT | Performed by: PHYSICIAN ASSISTANT

## 2018-09-20 PROCEDURE — 90686 IIV4 VACC NO PRSV 0.5 ML IM: CPT | Performed by: PHYSICIAN ASSISTANT

## 2018-09-20 PROCEDURE — 99213 OFFICE O/P EST LOW 20 MIN: CPT | Mod: 25 | Performed by: PHYSICIAN ASSISTANT

## 2018-09-20 NOTE — PROGRESS NOTES

## 2018-09-20 NOTE — PROGRESS NOTES
SUBJECTIVE:   Julio Cesar Cha is a 38 year old female who presents to clinic today for the following health issues:      Rash  Onset: since being pregnant    Description:   Location: all over  Character: all over it  Itching (Pruritis): YES    Progression of Symptoms:  same    Accompanying Signs & Symptoms:  Fever: no   Body aches or joint pain: no   Sore throat symptoms: no   Recent cold symptoms: no     History:   Previous similar rash: YES- with previous pregnancies    Precipitating factors:   Exposure to similar rash: no   New exposures: None   Recent travel: no     Alleviating factors:  nothing    Pregnant and Due dec      Problem list and histories reviewed & adjusted, as indicated.  Additional history: as documented    Patient Active Problem List   Diagnosis     Supervision of high-risk pregnancy of elderly multigravida     Hoarseness     Vitamin D deficiency     Anemia     Past Surgical History:   Procedure Laterality Date     ORTHOPEDIC SURGERY         Social History   Substance Use Topics     Smoking status: Never Smoker     Smokeless tobacco: Never Used     Alcohol use No     Family History   Problem Relation Age of Onset     Family History Negative Mother      Family History Negative Father      Family History Negative Sister      Diabetes Brother          Current Outpatient Prescriptions   Medication Sig Dispense Refill     Prenatal Vit-Fe Fumarate-FA (PRENATAL MULTIVITAMIN  WITH IRON) 28-0.8 MG TABS Take 1 tablet by mouth daily 90 each 2     VITAMIN D, CHOLECALCIFEROL, PO Take by mouth daily       acetaminophen (TYLENOL) 500 MG tablet Take 2 tablets (1,000 mg) by mouth every 6 hours as needed for mild pain 100 tablet 0     Allergies   Allergen Reactions     Omeprazole Diarrhea       Reviewed and updated as needed this visit by clinical staff  Tobacco  Allergies  Meds  Med Hx  Surg Hx  Fam Hx  Soc Hx      Reviewed and updated as needed this visit by Provider         ROS:  Constitutional,  HEENT, cardiovascular, pulmonary, gi and gu systems are negative, except as otherwise noted.    OBJECTIVE:     BP 96/57  Pulse 76  Temp 97.4  F (36.3  C) (Oral)  Resp 16  Wt 159 lb (72.1 kg)  LMP 04/08/2018 (Exact Date)  SpO2 100%  BMI 27.51 kg/m2  Body mass index is 27.51 kg/(m^2).  GENERAL: healthy, alert and no distress  SKIN: diffuse dry skin with scratch marks    ASSESSMENT/PLAN:       ICD-10-CM    1. Itching L29.9    2. Eczema, unspecified type L30.9    3. Need for prophylactic vaccination and inoculation against influenza Z23 FLU VACCINE, SPLIT VIRUS, IM (QUADRIVALENT) [27480]- >3 YRS     Vaccine Administration, Initial [11246]   suspect eczema. Lotion twice a day.   Ok for otc zyrtec as needed.   warning signs discussed.  See Patient Instructions     Jay Watts PA-C  [facility]

## 2018-09-20 NOTE — NURSING NOTE
"Chief Complaint   Patient presents with     Derm Problem       Initial BP 96/57  Pulse 76  Temp 97.4  F (36.3  C) (Oral)  Resp 16  Wt 159 lb (72.1 kg)  LMP 04/08/2018 (Exact Date)  SpO2 100%  BMI 27.51 kg/m2 Estimated body mass index is 27.51 kg/(m^2) as calculated from the following:    Height as of 4/11/17: 5' 3.75\" (1.619 m).    Weight as of this encounter: 159 lb (72.1 kg)..  BP completed using cuff size: regular    "

## 2018-09-20 NOTE — MR AVS SNAPSHOT
After Visit Summary   9/20/2018    Julio Cesar Cha    MRN: [id number]           Patient Information     Date Of Birth          1980        Visit Information        Provider Department      9/20/2018 10:25 AM Jay Watts PA-C; LANGUAGE [facility]        Today's Diagnoses     Itching    -  1    Eczema, unspecified type          Care Instructions                      Atopic Dermatitis (Eczema)   Description  Eczema is a general term for a group of long-term skin disorders. Eczema can affect the whole body but is most commonly found in specific areas such as the hands, feet, elbows, and knees. Another name for eczema is dermatitis.   Symptoms  Symptoms of eczema may include: dry, itchy, flaky skin and rashes on the face, inside the elbows, behind the knees, and on the hands and feet.   Scratching the skin can cause:  redness   swelling   cracking   clear fluid leaking from the skin   crusting   thick skin   Causes  The most common type of eczema is called atopic dermatitis. It can run in families or occur for no known reason. Eczema can also be caused by an allergic reaction to certain foods, clothing, lotions, soaps, plants, or even sweat. People with atopic dermatitis seem to have very sensitive immune systems that are more likely to react to irritants and allergens. If you have asthma or hay fever, you may get eczema more often. Eczema is not contagious.  What You Should Do At Home (Follow-up Care)   Put cream or ointment on your skin. Use fragrance-free moisturizing cream or ointment, rather than water-based lotion, after bathing and many times during the day.   Do not bathe too often. If you can get by with bathing every other day it may help. Use a mild cleansing bar such as Dove , Oil of Olay , or Basis . Do not use hot water, and do not soak in the tub. Both can dry your skin, making it itch more.   Use antihistamines. Antihistamine pills like  diphenhydramine (Benadryl ) may help you itch less.   Use steroid creams. Steroid creams or ointments that contain 1% hydrocortisone can help your rash and itching. You should not use the cream more often than directed by your healthcare provider.   If you were given a prescription, take or apply the medicine exactly as prescribed. If you do not think it is helping, call your healthcare provider. Do not increase how much or how often you use or take it without talking to your healthcare provider first.   Reduce dust mites by dusting and vacuuming often. House dust, house dust mites, molds, pollen, and animal dander from pets are all known to aggravate eczema. Vacuum carpets, curtains, and bedding at least once per week. Wash your bedding at least once per week at a high temperature with mild detergent.   Use high efficiency air filters. You can buy filters for your furnace that help decrease dust and allergens in the air.   Avoid bubble bath products, scented or deodorant soaps, and scented shower gels because they can cause skin sensitization and excessive drying of the skin.   If the air in your home is dry, a cool-mist humidifier can moisten the air and help prevent dry skin. Be sure to clean your humidifier often so that bacteria and mold can t grow.   Please keep all medicines out of the reach of children.   What You Can Do To Stay Healthy   Keep your skin well moisturized.   Avoid irritating substances.   Try to avoid or limit stressful situations.   Care Alerts  Call Your Healthcare Provider Right Away Or Return To The Emergency Department If:  The area that has been itching has open areas that are red and warm to touch and have drainage coming from them.   You start to have pus or other fluid coming from the irritated area.   You have a fever higher than 101.5  F (38.6  C) orally.   You start to have chills, nausea, vomiting, or muscle aches.   You have a question about whether your eczema needs to be  treated.   You have any symptoms that worry you.   Ok for over the counter : Eucerin cream, Lubriderm, Aveeno, Cetaphil lotions  Ok for over the counter Zyrtec           Follow-ups after your visit        Your next 10 appointments already scheduled     Oct 03, 2018 11:00 AM CDT   RETURN OB with Ying Mahan MD   [facility] ([facility])    [address],  [address]  [place] [address]   [phone]              Who to contact     If you have questions or need follow up information about today's clinic visit or your schedule please contact [facility] directly at [phone].  Normal or non-critical lab and imaging results will be communicated to you by MyChart, letter or phone within 4 business days after the clinic has received the results. If you do not hear from us within 7 days, please contact the clinic through MyChart or phone. If you have a critical or abnormal lab result, we will notify you by phone as soon as possible.  Submit refill requests through [employer] or call your pharmacy and they will forward the refill request to us. Please allow 3 business days for your refill to be completed.          Additional Information About Your Visit        Care EveryWhere ID     This is your Care EveryWhere ID. This could be used by other organizations to access your [facility] medical records  [id number]        Your Vitals Were     Pulse Temperature Respirations Last Period Pulse Oximetry BMI (Body Mass Index)    76 97.4  F (36.3  C) (Oral) 16 04/08/2018 (Exact Date) 100% 27.51 kg/m2       Blood Pressure from Last 3 Encounters:   09/20/18 96/57   08/31/18 99/64   08/03/18 100/64    Weight from Last 3 Encounters:   09/20/18 159 lb (72.1 kg)   08/31/18 164 lb 12.8 oz (74.8 kg)   08/03/18 163 lb (73.9 kg)              Today, you had the following     No orders found for display       Primary Care Provider Office Phone # Fax #    Ying Mahan MD  [phone]       [address] [address]        Equal Access to Services     MELODIE BAUTISTA : Hadii aad ku hadlico Thomson, oni deannyung, dayna forte, clemencia cardenas laChiquitalaverne kulwant. So [facility] [phone].    ATENCIÓN: Si habla español, tiene a ravi disposición servicios gratuitos de asistencia lingüística. Raghav al [phone].    We comply with applicable federal civil rights laws and [place] laws. We do not discriminate on the basis of race, color, national origin, age, disability, sex, sexual orientation, or gender identity.            Thank you!     Thank you for choosing [facility]  for your care. Our goal is always to provide you with excellent care. Hearing back from our patients is one way we can continue to improve our services. Please take a few minutes to complete the written survey that you may receive in the mail after your visit with us. Thank you!             Your Updated Medication List - Protect others around you: Learn how to safely use, store and throw away your medicines at www.disposemymeds.org.          This list is accurate as of 9/20/18 11:07 AM.  Always use your most recent med list.                   Brand Name Dispense Instructions for use Diagnosis    acetaminophen 500 MG tablet    TYLENOL    100 tablet    Take 2 tablets (1,000 mg) by mouth every 6 hours as needed for mild pain    Pregnancy headache in first trimester       prenatal multivitamin  with iron 28-0.8 MG Tabs     90 each    Take 1 tablet by mouth daily    Pregnancy test positive       VITAMIN D (CHOLECALCIFEROL) PO      Take by mouth daily

## 2018-09-20 NOTE — PATIENT INSTRUCTIONS
Atopic Dermatitis (Eczema)   Description  Eczema is a general term for a group of long-term skin disorders. Eczema can affect the whole body but is most commonly found in specific areas such as the hands, feet, elbows, and knees. Another name for eczema is dermatitis.   Symptoms  Symptoms of eczema may include: dry, itchy, flaky skin and rashes on the face, inside the elbows, behind the knees, and on the hands and feet.   Scratching the skin can cause:  redness   swelling   cracking   clear fluid leaking from the skin   crusting   thick skin   Causes  The most common type of eczema is called atopic dermatitis. It can run in families or occur for no known reason. Eczema can also be caused by an allergic reaction to certain foods, clothing, lotions, soaps, plants, or even sweat. People with atopic dermatitis seem to have very sensitive immune systems that are more likely to react to irritants and allergens. If you have asthma or hay fever, you may get eczema more often. Eczema is not contagious.  What You Should Do At Home (Follow-up Care)   Put cream or ointment on your skin. Use fragrance-free moisturizing cream or ointment, rather than water-based lotion, after bathing and many times during the day.   Do not bathe too often. If you can get by with bathing every other day it may help. Use a mild cleansing bar such as Dove , Oil of Olay , or Basis . Do not use hot water, and do not soak in the tub. Both can dry your skin, making it itch more.   Use antihistamines. Antihistamine pills like diphenhydramine (Benadryl ) may help you itch less.   Use steroid creams. Steroid creams or ointments that contain 1% hydrocortisone can help your rash and itching. You should not use the cream more often than directed by your healthcare provider.   If you were given a prescription, take or apply the medicine exactly as prescribed. If you do not think it is helping, call your healthcare provider. Do not increase  how much or how often you use or take it without talking to your healthcare provider first.   Reduce dust mites by dusting and vacuuming often. House dust, house dust mites, molds, pollen, and animal dander from pets are all known to aggravate eczema. Vacuum carpets, curtains, and bedding at least once per week. Wash your bedding at least once per week at a high temperature with mild detergent.   Use high efficiency air filters. You can buy filters for your furnace that help decrease dust and allergens in the air.   Avoid bubble bath products, scented or deodorant soaps, and scented shower gels because they can cause skin sensitization and excessive drying of the skin.   If the air in your home is dry, a cool-mist humidifier can moisten the air and help prevent dry skin. Be sure to clean your humidifier often so that bacteria and mold can t grow.   Please keep all medicines out of the reach of children.   What You Can Do To Stay Healthy   Keep your skin well moisturized.   Avoid irritating substances.   Try to avoid or limit stressful situations.   Care Alerts  Call Your Healthcare Provider Right Away Or Return To The Emergency Department If:  The area that has been itching has open areas that are red and warm to touch and have drainage coming from them.   You start to have pus or other fluid coming from the irritated area.   You have a fever higher than 101.5  F (38.6  C) orally.   You start to have chills, nausea, vomiting, or muscle aches.   You have a question about whether your eczema needs to be treated.   You have any symptoms that worry you.   Ok for over the counter : Eucerin cream, Lubriderm, Aveeno, Cetaphil lotions  Ok for over the counter Zyrtec

## 2018-10-03 ENCOUNTER — OFFICE VISIT (OUTPATIENT)
Dept: FAMILY MEDICINE | Facility: CLINIC | Age: 38
End: 2018-10-03
Payer: COMMERCIAL

## 2018-10-03 VITALS
WEIGHT: 162.8 LBS | HEART RATE: 83 BPM | OXYGEN SATURATION: 100 % | BODY MASS INDEX: 28.16 KG/M2 | SYSTOLIC BLOOD PRESSURE: 100 MMHG | RESPIRATION RATE: 14 BRPM | DIASTOLIC BLOOD PRESSURE: 64 MMHG

## 2018-10-03 DIAGNOSIS — R21 RASH: ICD-10-CM

## 2018-10-03 DIAGNOSIS — O09.529 SUPERVISION OF HIGH-RISK PREGNANCY OF ELDERLY MULTIGRAVIDA: Primary | ICD-10-CM

## 2018-10-03 DIAGNOSIS — L29.9 PRURITIC DISORDER: ICD-10-CM

## 2018-10-03 LAB
ALBUMIN SERPL-MCNC: 2.5 G/DL (ref 3.4–5)
ALP SERPL-CCNC: 60 U/L (ref 40–150)
ALT SERPL W P-5'-P-CCNC: 9 U/L (ref 0–50)
AST SERPL W P-5'-P-CCNC: 14 U/L (ref 0–45)
BILIRUB DIRECT SERPL-MCNC: <0.1 MG/DL (ref 0–0.2)
BILIRUB SERPL-MCNC: 0.2 MG/DL (ref 0.2–1.3)
GLU GEST SCREEN 1HR 50G: 96 (ref 0–129)
HEMOGLOBIN: 9.4 G/DL (ref 11.7–15.7)
PROT SERPL-MCNC: 6.5 G/DL (ref 6.8–8.8)

## 2018-10-03 RX ORDER — HYDROCORTISONE VALERATE 2 MG/G
OINTMENT TOPICAL
Qty: 45 G | Refills: 0 | Status: SHIPPED | OUTPATIENT
Start: 2018-10-03 | End: 2018-11-16

## 2018-10-03 NOTE — NURSING NOTE
Due to patient being non-English speaking/uses sign language, an  was used for this visit. Only for face-to-face interpretation by an external agency, date and length of interpretation can be found on the scanned worksheet.     name: UGO  Agency: [employer]  Language: Australian   Telephone number: [phone]  Type of interpretation: Face-to-face, spoken   AMENA Hanson

## 2018-10-03 NOTE — PROGRESS NOTES
Return OB visit  29-34 weeks    Subjective:   Julio Cesar is a 38 year old  female at 29w4d who returns for prenatal care. ELZA Dec 15, 2018.  - Concerns today: lower abdominal pressure. Itching diffusely for months. Hands but not feet. Thought was from fruit, so stopped eating it. Didn't help. Had cream from NP in past that helped - westcort.  - Patient reports no vaginal bleeding, no leakage of fluid. Contractions none. Fetal movement is present.   - Small amount white vaginal discharge - no itching, slight odor. No dysuria.     Objective:   /64  Pulse 83  Resp 14  Wt 162 lb 12.8 oz (73.8 kg)  LMP 2018 (Exact Date)  SpO2 100%  BMI 28.16 kg/m2   Const: No distress  Abd: Gravid.   See flowsheet for FH, FHTs, fetal presentation, edema.  Excoriations around neck, upper chest    Prenatal labs:   -   Hemoglobin   Date Value Ref Range Status   10/03/2018 9.4 (L) 11.7 - 15.7 g/dL Final   1 hr glucose, RPR, bile acid, liver functions    Assessment & plan:   IUP at 29w4d weeks; evaluate for cholestasis     - Patient is measuring appropriately for gestational age. Pregnancy weight gain has been -1 lb 3.2 oz (-0.544 kg) to date, which is  not adequate.     Options for treatment and follow-up care were reviewed with the patient and/or guardian. Julio Cesar Trejo Cha and/or guardian engaged in the decision making process and verbalized understanding of the options discussed and agreed with the final plan.    Ying Mahan MD

## 2018-10-03 NOTE — MR AVS SNAPSHOT
After Visit Summary   10/3/2018    Julio Cesar Cha    MRN: [id number]           Patient Information     Date Of Birth          1980        Visit Information        Provider Department      10/3/2018 11:00 AM Ying Mahan MD [facility]        Today's Diagnoses     Supervision of high-risk pregnancy of elderly multigravida    -  1    Pruritic disorder        Rash           Follow-ups after your visit        Who to contact     Please call your clinic at [phone] to:    Ask questions about your health    Make or cancel appointments    Discuss your medicines    Learn about your test results    Speak to your doctor            Additional Information About Your Visit        Care EveryWhere ID     This is your Care EveryWhere ID. This could be used by other organizations to access your [place] medical records  [id number]        Your Vitals Were     Pulse Respirations Last Period Pulse Oximetry BMI (Body Mass Index)       83 14 04/08/2018 (Exact Date) 100% 28.16 kg/m2        Blood Pressure from Last 3 Encounters:   10/03/18 100/64   09/20/18 96/57   08/31/18 99/64    Weight from Last 3 Encounters:   10/03/18 162 lb 12.8 oz (73.8 kg)   09/20/18 159 lb (72.1 kg)   08/31/18 164 lb 12.8 oz (74.8 kg)              We Performed the Following     Bile acids total     Glucose tolerance gest screen 1 hour     Hemoglobin (HGB) ([facility])     Hepatic panel     Treponema Abs w Reflex to RPR and Titer          Today's Medication Changes          These changes are accurate as of 10/3/18 12:05 PM.  If you have any questions, ask your nurse or doctor.               Start taking these medicines.        Dose/Directions    hydrocortisone valerate 0.2 % ointment   Commonly known as:  WEST-GABRIEL   Used for:  Rash   Started by:  Ying Mahan MD        Apply sparingly to affected area three times daily as needed.   Quantity:  45 g   Refills:  0            Where to get your  medicines      These medications were sent to [facility] [place], [place] - [address] [address] [facility] [address], [address]  [address] [address], [address], [facility] [address]     Phone:  [phone]     hydrocortisone valerate 0.2 % ointment                Primary Care Provider Office Phone # Fax #    Ying Pelon Mahan [phone]       [address]  [facility] [address]        Equal Access to Services     MELODIE BAUTISTA : Hadii aad ku hadasho Soomaali, waaxda luqadaha, qaybta kaalmada adeegyada, waxay idiin hayaan adeeg kharaalice lamedhat . So [facility] [phone].    ATENCIÓN: Si habla español, tiene a ravi disposición servicios gratuitos de asistencia lingüística. [facility] [phone].    We comply with applicable federal civil rights laws and [place] laws. We do not discriminate on the basis of race, color, national origin, age, disability, sex, sexual orientation, or gender identity.            Thank you!     Thank you for choosing \[facility]\"" [facility]  for your care. Our goal is always to provide you with excellent care. Hearing back from our patients is one way we can continue to improve our services. Please take a few minutes to complete the written survey that you may receive in the mail after your visit with us. Thank you!             Your Updated Medication List - Protect others around you: Learn how to safely use, store and throw away your medicines at www.disposemymeds.org.          This list is accurate as of 10/3/18 12:05 PM.  Always use your most recent med list.                   Brand Name Dispense Instructions for use Diagnosis    acetaminophen 500 MG tablet    TYLENOL    100 tablet    Take 2 tablets (1,000 mg) by mouth every 6 hours as needed for mild pain    Pregnancy headache in first trimester       hydrocortisone valerate 0.2 % ointment    WEST-GABRIEL    45 g    Apply sparingly to affected area three times daily as needed.    Rash       prenatal multivitamin   with iron 28-0.8 MG Tabs     90 each    Take 1 tablet by mouth daily    Pregnancy test positive       VITAMIN D (CHOLECALCIFEROL) PO      Take by mouth daily

## 2018-10-03 NOTE — LETTER
October 5, 2018      Julio Cesar Cha  [address] [facility] [address]        Dear Julio Cesar,    Thank you for getting your care at [facility]. Please see below for your test results.    Resulted Orders   Treponema Abs w Reflex to RPR and Titer   Result Value Ref Range    Treponema Antibodies Nonreactive NR^Nonreactive   Hemoglobin (HGB) ([facility])   Result Value Ref Range    Hemoglobin 9.4 (L) 11.7 - 15.7 g/dL   Bile acids total   Result Value Ref Range    Bile Acids Total 7 0 - 10 umol/L      Comment:      (Note)  INTERPRETIVE INFORMATION: Bile Acids, Total  Reference Interval applies to fasting specimens.  Performed by [facility],  [address] [place] [address],[place] [address] [phone]  [email or web address], Odilon Fuentes MD, Lab. Director     Hepatic panel   Result Value Ref Range    Bilirubin Direct <0.1 0.0 - 0.2 mg/dL    Bilirubin Total 0.2 0.2 - 1.3 mg/dL    Albumin 2.5 (L) 3.4 - 5.0 g/dL    Protein Total 6.5 (L) 6.8 - 8.8 g/dL    Alkaline Phosphatase 60 40 - 150 U/L    ALT 9 0 - 50 U/L    AST 14 0 - 45 U/L   Glucose Challenge  1 Hr  ([facility])   Result Value Ref Range    Glu Gest Screen 1hr 50g 96 0 - 129     Your results are all good EXCEPT your hemoglobin is low. I have sent a prescription to your pharmacy for iron tablets. Take one of them a day with orange juice.    Sincerely,    Ying Mahan MD

## 2018-10-04 LAB — T PALLIDUM AB SER QL: NONREACTIVE

## 2018-10-05 DIAGNOSIS — D50.9 IRON DEFICIENCY ANEMIA, UNSPECIFIED IRON DEFICIENCY ANEMIA TYPE: Primary | ICD-10-CM

## 2018-10-05 LAB — BILE AC SERPL-SCNC: 7 UMOL/L (ref 0–10)

## 2018-10-05 RX ORDER — FERROUS SULFATE 325(65) MG
325 TABLET ORAL
Qty: 90 TABLET | Refills: 3 | Status: SHIPPED | OUTPATIENT
Start: 2018-10-05 | End: 2020-04-16

## 2018-10-17 ENCOUNTER — OFFICE VISIT (OUTPATIENT)
Dept: URGENT CARE | Facility: URGENT CARE | Age: 38
End: 2018-10-17
Payer: COMMERCIAL

## 2018-10-17 VITALS
OXYGEN SATURATION: 100 % | BODY MASS INDEX: 28.2 KG/M2 | RESPIRATION RATE: 16 BRPM | TEMPERATURE: 97.2 F | DIASTOLIC BLOOD PRESSURE: 58 MMHG | WEIGHT: 163 LBS | HEART RATE: 88 BPM | SYSTOLIC BLOOD PRESSURE: 106 MMHG

## 2018-10-17 DIAGNOSIS — Z34.93 THIRD TRIMESTER PREGNANCY AT LESS THAN 36 WEEKS: ICD-10-CM

## 2018-10-17 DIAGNOSIS — R10.84 ABDOMINAL PAIN, GENERALIZED: ICD-10-CM

## 2018-10-17 DIAGNOSIS — M54.50 ACUTE LEFT-SIDED LOW BACK PAIN WITHOUT SCIATICA: Primary | ICD-10-CM

## 2018-10-17 LAB
ALBUMIN UR-MCNC: NEGATIVE MG/DL
APPEARANCE UR: CLEAR
BACTERIA #/AREA URNS HPF: ABNORMAL /HPF
BILIRUB UR QL STRIP: NEGATIVE
COLOR UR AUTO: YELLOW
GLUCOSE UR STRIP-MCNC: NEGATIVE MG/DL
HGB UR QL STRIP: NEGATIVE
KETONES UR STRIP-MCNC: NEGATIVE MG/DL
LEUKOCYTE ESTERASE UR QL STRIP: ABNORMAL
NITRATE UR QL: NEGATIVE
NON-SQ EPI CELLS #/AREA URNS LPF: ABNORMAL /LPF
PH UR STRIP: 7 PH (ref 5–7)
RBC #/AREA URNS AUTO: ABNORMAL /HPF
SOURCE: ABNORMAL
SP GR UR STRIP: 1.01 (ref 1–1.03)
UROBILINOGEN UR STRIP-ACNC: 0.2 EU/DL (ref 0.2–1)
WBC #/AREA URNS AUTO: ABNORMAL /HPF

## 2018-10-17 PROCEDURE — 99214 OFFICE O/P EST MOD 30 MIN: CPT | Performed by: FAMILY MEDICINE

## 2018-10-17 PROCEDURE — 81001 URINALYSIS AUTO W/SCOPE: CPT | Performed by: FAMILY MEDICINE

## 2018-10-17 ASSESSMENT — PAIN SCALES - GENERAL: PAINLEVEL: MODERATE PAIN (5)

## 2018-10-17 NOTE — NURSING NOTE
"Chief Complaint   Patient presents with     Back Pain     back pain x 2 days, frequency, pt noticed mucus in her urine and low abdominal pain       Initial /58  Pulse 88  Temp 97.2  F (36.2  C) (Oral)  Resp 16  Wt 163 lb (73.9 kg)  LMP 04/08/2018 (Exact Date)  SpO2 100%  BMI 28.2 kg/m2 Estimated body mass index is 28.2 kg/(m^2) as calculated from the following:    Height as of 4/11/17: 5' 3.75\" (1.619 m).    Weight as of this encounter: 163 lb (73.9 kg).  Medication Reconciliation: complete  Roberto Armstrong MA    "

## 2018-10-17 NOTE — PATIENT INSTRUCTIONS
Patient was having back pain and abdominal pain past 3 days   Recommend hospital evaluation.  She plans to contact her obgyn tonight or go to the nearest ER.

## 2018-10-17 NOTE — MR AVS SNAPSHOT
After Visit Summary   10/17/2018    Julio Cesar Cha    MRN: [id number]           Patient Information     Date Of Birth          1980        Visit Information        Provider Department      10/17/2018 5:00 PM Adriana Irizarry MD [facility]        Today's Diagnoses     Acute left-sided low back pain without sciatica    -  1    Abdominal pain, generalized        Third trimester pregnancy at less than 36 weeks          Care Instructions    Patient was having back pain and abdominal pain past 3 days   Recommend hospital evaluation.  She plans to contact her obgyn or go to the nearest ER.            Follow-ups after your visit        Your next 10 appointments already scheduled     Oct 31, 2018 11:00 AM CDT   RETURN OB with Ying Mahan MD   [facility] ([facility])    [address] [place] [address],  [address]  [place] [address]   [phone]              Who to contact     If you have questions or need follow up information about today's clinic visit or your schedule please contact [facility] directly at [phone].  Normal or non-critical lab and imaging results will be communicated to you by MyChart, letter or phone within 4 business days after the clinic has received the results. If you do not hear from us within 7 days, please contact the clinic through MyChart or phone. If you have a critical or abnormal lab result, we will notify you by phone as soon as possible.  Submit refill requests through [employer] or call your pharmacy and they will forward the refill request to us. Please allow 3 business days for your refill to be completed.          Additional Information About Your Visit        Care EveryWhere ID     This is your Care EveryWhere ID. This could be used by other organizations to access your [place] medical records  [id number]        Your Vitals Were     Pulse Temperature Respirations Last Period Pulse  Oximetry BMI (Body Mass Index)    88 97.2  F (36.2  C) (Oral) 16 04/08/2018 (Exact Date) 100% 28.2 kg/m2       Blood Pressure from Last 3 Encounters:   10/17/18 106/58   10/03/18 100/64   09/20/18 96/57    Weight from Last 3 Encounters:   10/17/18 163 lb (73.9 kg)   10/03/18 162 lb 12.8 oz (73.8 kg)   09/20/18 159 lb (72.1 kg)              We Performed the Following     *UA reflex to Microscopic and Culture ([facility] (except [place] and [facility])     Urine Microscopic        Primary Care Provider Office Phone # Fax #    Ying Mahan [phone]       [address] [address]        Equal Access to Services     [facility] : Hadii eli grande Solc, waaxda deannqadaha, qaybta michaelalmaaaron forte, clemencia friedman . So [facility] [phone].    ATENCIÓN: Si habla español, tiene a ravi disposición servicios gratuitos de asistencia lingüística. Raghav al [phone].    We comply with applicable federal civil rights laws and [place] laws. We do not discriminate on the basis of race, color, national origin, age, disability, sex, sexual orientation, or gender identity.            Thank you!     Thank you for choosing [facility]  for your care. Our goal is always to provide you with excellent care. Hearing back from our patients is one way we can continue to improve our services. Please take a few minutes to complete the written survey that you may receive in the mail after your visit with us. Thank you!             Your Updated Medication List - Protect others around you: Learn how to safely use, store and throw away your medicines at www.disposemymeds.org.          This list is accurate as of 10/17/18  5:44 PM.  Always use your most recent med list.                   Brand Name Dispense Instructions for use Diagnosis    acetaminophen 500 MG tablet    TYLENOL    100 tablet    Take 2 tablets (1,000 mg) by mouth  every 6 hours as needed for mild pain    Pregnancy headache in first trimester       ferrous sulfate 325 (65 Fe) MG tablet    IRON    90 tablet    Take 1 tablet (325 mg) by mouth daily (with breakfast)    Iron deficiency anemia, unspecified iron deficiency anemia type       hydrocortisone valerate 0.2 % ointment    WEST-GABRIEL    45 g    Apply sparingly to affected area three times daily as needed.    Rash       prenatal multivitamin  with iron 28-0.8 MG Tabs     90 each    Take 1 tablet by mouth daily    Pregnancy test positive       VITAMIN D (CHOLECALCIFEROL) PO      Take by mouth daily

## 2018-10-17 NOTE — PROGRESS NOTES
Chief complaint: Back and abdominal pain    Patient is 31 weeks 4 days pregnant    Complaining of left back pain for 3 days  Also with abdominal pain  No loss of control of bowel or bladder, no numbness or weakness down the legs    Accompanied by    They refused  services. Patient is able to speak english.  is more fluent and is translating  location: lower abdominal area   Severity: moderate to severe   description: sharp  aggravating symptoms: movement certain positions   relieving symptoms: nothing  nausea and vomiting: none  diarrhea: none  treatments tried: none    urinary symptoms:  none   fever or chills:  none  weight loss or constitutional symptoms: none  melena, hematochezia, or hematemesis: none    Baby moving. No vaginal bleeding  Had some vaginal discharge this morning only. None currently     Problem list, Medication list, Allergies, and Medical/Social/Surgical histories reviewed in [facility] and updated as appropriate.      ROS:  General: negative for fever  Resp: negative for chest pain   CV: negative for chest pain  ABD: as above  : negative for dysuria  Neurologic:negative for Headache  Psych: denies any thoughts of harming self or others.     Constitutional, HEENT, cardiovascular, pulmonary, GI, , musculoskeletal, neuro, skin, endocrine and psych systems are negative, except as otherwise noted.    OBJECTIVE:  /58  Pulse 88  Temp 97.2  F (36.2  C) (Oral)  Resp 16  Wt 163 lb (73.9 kg)  LMP 04/08/2018 (Exact Date)  SpO2 100%  BMI 28.2 kg/m2   General:   awake, alert, and cooperative.  NAD.   Head: Normocephalic, atraumatic.  Eyes: Conjunctiva clear, non icteric. KATY  Heart: Regular rate and rhythm. No murmur.  Lungs: Chest is clear; no wheezes or rales.  Muscular:   No spine tenderness positive bilateral lumbar muscle spasm, No abnormal straight leg raise testing MMT5/5, sensory intact, normal reflexes  ABD: soft, gravid uterus, bilateral lower abdominal   tenderness to palpation , no rigidity, guarding or rebound , bowel sounds intact  RECTAL: declined/deferred   Neuro: Alert and oriented - normal speech.       Diagnostic Test Results:  Results for orders placed or performed in visit on 10/17/18 (from the past 24 hour(s))   *UA reflex to Microscopic and Culture ([facility] (except [place] and [place])   Result Value Ref Range    Color Urine Yellow     Appearance Urine Clear     Glucose Urine Negative NEG^Negative mg/dL    Bilirubin Urine Negative NEG^Negative    Ketones Urine Negative NEG^Negative mg/dL    Specific Gravity Urine 1.010 1.003 - 1.035    Blood Urine Negative NEG^Negative    pH Urine 7.0 5.0 - 7.0 pH    Protein Albumin Urine Negative NEG^Negative mg/dL    Urobilinogen Urine 0.2 0.2 - 1.0 EU/dL    Nitrite Urine Negative NEG^Negative    Leukocyte Esterase Urine Small (A) NEG^Negative    Source Midstream Urine    Urine Microscopic   Result Value Ref Range    WBC Urine 0 - 5 OTO5^0 - 5 /HPF    RBC Urine O - 2 OTO2^O - 2 /HPF    Squamous Epithelial /LPF Urine Few FEW^Few /LPF    Bacteria Urine Few (A) NEG^Negative /HPF     ASSESSMENT:    ICD-10-CM    1. Acute left-sided low back pain without sciatica M54.5 *UA reflex to Microscopic and Culture ([facility] (except [place] and [place])     Urine Microscopic   2. Abdominal pain, generalized R10.84    3. Third trimester pregnancy at less than 36 weeks Z34.93          PLAN:      Patient was having back pain and abdominal pain past 3 days   Recommend hospital evaluation.  She plans to contact her obgyn tonight or go to the nearest ER - she is unsure of her plan yet.  Risks discussed in detail with her and her .   Recommend to go now to hospital.   AVS printed with these instructions as well     Adriana Irizarry MD

## 2018-10-18 ENCOUNTER — OFFICE VISIT (OUTPATIENT)
Dept: FAMILY MEDICINE | Facility: CLINIC | Age: 38
End: 2018-10-18
Payer: COMMERCIAL

## 2018-10-18 VITALS
HEART RATE: 90 BPM | OXYGEN SATURATION: 100 % | SYSTOLIC BLOOD PRESSURE: 105 MMHG | DIASTOLIC BLOOD PRESSURE: 67 MMHG | TEMPERATURE: 98 F | RESPIRATION RATE: 20 BRPM | BODY MASS INDEX: 28.44 KG/M2 | WEIGHT: 164.4 LBS

## 2018-10-18 DIAGNOSIS — R10.2 PELVIC PRESSURE IN PREGNANCY, ANTEPARTUM, THIRD TRIMESTER: ICD-10-CM

## 2018-10-18 DIAGNOSIS — D64.9 ANEMIA, UNSPECIFIED TYPE: ICD-10-CM

## 2018-10-18 DIAGNOSIS — O26.893 PELVIC PRESSURE IN PREGNANCY, ANTEPARTUM, THIRD TRIMESTER: ICD-10-CM

## 2018-10-18 DIAGNOSIS — Z23 NEED FOR TDAP VACCINATION: ICD-10-CM

## 2018-10-18 DIAGNOSIS — O09.529 SUPERVISION OF HIGH-RISK PREGNANCY OF ELDERLY MULTIGRAVIDA: Primary | ICD-10-CM

## 2018-10-18 NOTE — PROGRESS NOTES
Return OB visit  29-34 weeks      Subjective:   Julio Cesar is a 38 year old  female at 31w5d who returns for prenatal care. ELZA Dec 15, 2018.  - Concerns today: Back pain and pelvic pain especially on the left side and feels like baby is coming out x 2 weeks and left sided pelvic pain is 3 days    - Patient reports no vaginal bleeding and has had small leakage of fluid x 1 day. Contractions no. Fetal movement is present.   - No nausea/vomiting. No heartburn.   - No vaginal discharge. No dysuria.   - No headache, vision changes, lower extremity swelling, upper abdominal pain, chest pain, shortness of breath.   - Mood has been stable.    A Sponto  was used for this visit.    Objective:   /67 (BP Location: Left arm, Patient Position: Sitting, Cuff Size: Adult Regular)  Pulse 90  Temp 98  F (36.7  C) (Oral)  Resp 20  Wt 164 lb 6.4 oz (74.6 kg)  LMP 2018 (Exact Date)  SpO2 100%  BMI 28.44 kg/m2   Const: No distress  Abd: Gravid.   See flowsheet for FH, FHTs, fetal presentation, edema.    Prenatal labs:   -   Hemoglobin   Date Value Ref Range Status   10/03/2018 9.4 (L) 11.7 - 15.7 g/dL Final       Assessment & plan:   IUP at 31w5d weeks by first trimester ultrasound - LMP 4 weeks off    - Pregnancy complicated by: advanced maternal age  - Prenatal labs reviewed. Patient did pass 1 hour GCT. Fetal survey showed no abnormalities requiring follow up ultrasound.   - Patient is measuring appropriately for gestational age. Pregnancy weight gain has been 6.4 oz (0.181 kg) to date, which is inadequate.   - Patient plans to breast feed and use unsure for contraception after delivery.  - Patient will continue taking prenatal vitamins and avoiding cigarettes & alcohol. Tdap to be given today and flu shot given previously at another clinic.     -Breastfeeding education provided:  - Supporting a non-medicated birth  - Skin to Skin  - Non-separation of mother and baby    - Return to clinic in 2 weeks.      - Specific concerns addressed today:     Supervision of high-risk pregnancy of elderly multigravida    Pelvic pressure in pregnancy, antepartum, third trimester  Comments:  Less likely,  labor but I am concerned about  cervical dilation. Cannot collect FFN or perform cervical length ultrasound.Advise go to triage for advanced evaluation    Anemia, unspecified type  Comments:  Discussed results. Take iron. Additional labs performed. Plan for IV Venofer if criteria met.  Orders:  -     IRON AND IRON BINDING CAPACITY  -     Ferritin  -     CBC with platelets differential  -     Transferrin    Need for Tdap vaccination  Comments:  Agreeable to vaccination.  Orders:  -     ADMIN VACCINE, INITIAL  -     TDAP VACCINE (BOOSTRIX)      Options for treatment and follow-up care were reviewed with the patient and/or guardian. Julio Cesar Cha and/or guardian engaged in the decision making process and verbalized understanding of the options discussed and agreed with the final plan.    Hugh Trevino MD

## 2018-10-18 NOTE — MR AVS SNAPSHOT
After Visit Summary   10/18/2018    Julio Cesar Cha    MRN: [id number]           Patient Information     Date Of Birth          1980        Visit Information        Provider Department      10/18/2018 4:20 PM Hugh Trevino MD [facility]        Today's Diagnoses     Supervision of high-risk pregnancy of elderly multigravida    -  1    Pelvic pressure in pregnancy, antepartum, third trimester        Anemia, unspecified type        Need for Tdap vaccination          Care Instructions    Please go to triage as soon as possible for evaluation of your increasing pelvic pressure. They will be able to effectively rule out that your cervix is not dilating especially with this new pain. It is possible that the fetus is dropping lower in the pelvis due to your being further along.    Please take the iron supplementation.    Someone will call to let you know if you need to start iron infusions.          Follow-ups after your visit        Your next 10 appointments already scheduled     Oct 31, 2018 11:00 AM CDT   RETURN OB with MD [facility] ([facility])    [facility] [address] [place] [address],  [address]  [place] [address]   [phone]              Who to contact     Please call your clinic at [phone] to:    Ask questions about your health    Make or cancel appointments    Discuss your medicines    Learn about your test results    Speak to your doctor            Additional Information About Your Visit        Care EveryWhere ID     This is your Care EveryWhere ID. This could be used by other organizations to access your [place] medical records  [id number]        Your Vitals Were     Pulse Temperature Respirations Last Period Pulse Oximetry BMI (Body Mass Index)    90 98  F (36.7  C) (Oral) 20 04/08/2018 (Exact Date) 100% 28.44 kg/m2       Blood Pressure from Last 3 Encounters:   10/18/18 105/67   10/17/18 106/58    10/03/18 100/64    Weight from Last 3 Encounters:   10/18/18 164 lb 6.4 oz (74.6 kg)   10/17/18 163 lb (73.9 kg)   10/03/18 162 lb 12.8 oz (73.8 kg)              We Performed the Following     ADMIN VACCINE, INITIAL     CBC with platelets differential     Ferritin     IRON AND IRON BINDING CAPACITY     TDAP VACCINE (BOOSTRIX)     Transferrin        Primary Care Provider Office Phone # Fax #    Ying Pelon Mahan [phone]       [address] [address]        Equal Access to Services     [facility] : Hadii eli Thomson, oni kendrick, dayna forte, clemencia friedman . So [facility] [phone].    ATENCIÓN: Si habla español, tiene a ravi disposición servicios gratuitos de asistencia lingüística. [facility] [phone].    We comply with applicable federal civil rights laws and [place] laws. We do not discriminate on the basis of race, color, national origin, age, disability, sex, sexual orientation, or gender identity.            Thank you!     Thank you for choosing [facility]  for your care. Our goal is always to provide you with excellent care. Hearing back from our patients is one way we can continue to improve our services. Please take a few minutes to complete the written survey that you may receive in the mail after your visit with us. Thank you!             Your Updated Medication List - Protect others around you: Learn how to safely use, store and throw away your medicines at www.disposemymeds.org.          This list is accurate as of 10/18/18  5:16 PM.  Always use your most recent med list.                   Brand Name Dispense Instructions for use Diagnosis    acetaminophen 500 MG tablet    TYLENOL    100 tablet    Take 2 tablets (1,000 mg) by mouth every 6 hours as needed for mild pain    Pregnancy headache in first trimester       ferrous sulfate 325 (65 Fe) MG tablet    IRON    90 tablet     Take 1 tablet (325 mg) by mouth daily (with breakfast)    Iron deficiency anemia, unspecified iron deficiency anemia type       hydrocortisone valerate 0.2 % ointment    WEST-GABRIEL    45 g    Apply sparingly to affected area three times daily as needed.    Rash       prenatal multivitamin  with iron 28-0.8 MG Tabs     90 each    Take 1 tablet by mouth daily    Pregnancy test positive       VITAMIN D (CHOLECALCIFEROL) PO      Take by mouth daily

## 2018-10-18 NOTE — PATIENT INSTRUCTIONS
Please go to triage as soon as possible for evaluation of your increasing pelvic pressure. They will be able to effectively rule out that your cervix is not dilating especially with this new pain. It is possible that the fetus is dropping lower in the pelvis due to your being further along.    Please take the iron supplementation.    Someone will call to let you know if you need to start iron infusions.    Come back in 2 weeks    Thank you for coming to [facility]!  - If you had lab testing today and your results are normal they will be be mailed to you or sent to your MyChart within seven days.   - If the lab tests need quick action we will call you with the results.  - The phone number we will call with results is # [phone] (home) . If this is not the best number please call our clinic and change the number.  - If any referrals were made to [facility] and you don't get a call, call central scheduling [phone]  - If you need any refills please call your pharmacy and they will contact us.  - If you had an XRay/CT/Ultrasound/MRI ordered the number is [phone] to schedule or change your appointment  - If you have any concerns about today's visit or wish to schedule another appointment please call our office [phone] (8-5:00 M-F)  - If you have urgent medical concerns call [phone] at any time of the day.  - If you have a medical emergency please call [phone].    Because you are pregnant, we have additional resources for you:  - You may call and ask to speak with one of our clinic nurses at [phone] during normal business hours, for non-urgent questions about your pregnancy.  - Immediate OB help is also available 24 hours a day, seven days a week via the [facility] - [phone]  located at [address] [place] [phone]    Prenatal Reminders:  Before 14 weeks: dating ultrasound       This ultrasound helps us determine your  dates accurately.  15 - 18 weeks: Optional genetic testing (quad screen)       This testing helps understand your baby's risk for some genetic abnormalities.  22 - 24 weeks: Ultrasound (fetal anatomy survey)       This testing will look for early growth abnormalities, and might tell the baby's sex.  24 - 28 weeks: One hour sugar test (GCT)        This test helps identify diabetes of pregnancy.  27 - 36 weeks: Tetanus shot (Tdap)       This shot helps protect you and your baby from tetanus and whooping cough.  36 weeks and later: Group B Strep test (GBS)       This test helps predict if you need antibiotics in labor to prevent infection in your baby.  Anytime September to April: flu shot       This shot helps protect you and your family from the flu.  This is especially important during pregnancy!  Once every trimester: blood iron test (hemoglobin)       This test helps us know if you will need extra iron to support your baby.  At any time during or after your pregnancy: Some women experience baby blues.  We can help you with:  ? Feeling anxious, ? Overwhelmed or sad       ? Trouble sleeping ? Crying uncontrollably     ? Trouble caring for yourself or baby.  How frequently should you see your provider?  < 28 weeks: every 4 weeks  28-36 weeks: every 2 weeks  >36 weeks: every week

## 2018-10-19 LAB
BASOPHILS # BLD AUTO: 0 10E9/L (ref 0–0.2)
BASOPHILS NFR BLD AUTO: 0 %
DIFFERENTIAL METHOD BLD: ABNORMAL
EOSINOPHIL # BLD AUTO: 0.2 10E9/L (ref 0–0.7)
EOSINOPHIL NFR BLD AUTO: 2.6 %
ERYTHROCYTE [DISTWIDTH] IN BLOOD BY AUTOMATED COUNT: 14.7 % (ref 10–15)
FERRITIN SERPL-MCNC: 4 NG/ML (ref 12–150)
HCT VFR BLD AUTO: 29.8 % (ref 35–47)
HGB BLD-MCNC: 9.2 G/DL (ref 11.7–15.7)
IMM GRANULOCYTES # BLD: 0 10E9/L (ref 0–0.4)
IMM GRANULOCYTES NFR BLD: 0.6 %
IRON SATN MFR SERPL: 7 % (ref 15–46)
IRON SERPL-MCNC: 32 UG/DL (ref 35–180)
LYMPHOCYTES # BLD AUTO: 1.4 10E9/L (ref 0.8–5.3)
LYMPHOCYTES NFR BLD AUTO: 21.8 %
MCH RBC QN AUTO: 25.8 PG (ref 26.5–33)
MCHC RBC AUTO-ENTMCNC: 30.9 G/DL (ref 31.5–36.5)
MCV RBC AUTO: 84 FL (ref 78–100)
MONOCYTES # BLD AUTO: 0.7 10E9/L (ref 0–1.3)
MONOCYTES NFR BLD AUTO: 10.6 %
NEUTROPHILS # BLD AUTO: 4.2 10E9/L (ref 1.6–8.3)
NEUTROPHILS NFR BLD AUTO: 64.4 %
NRBC # BLD AUTO: 0 10*3/UL
NRBC BLD AUTO-RTO: 0 /100
PLATELET # BLD AUTO: 285 10E9/L (ref 150–450)
RBC # BLD AUTO: 3.57 10E12/L (ref 3.8–5.2)
TIBC SERPL-MCNC: 473 UG/DL (ref 240–430)
TRANSFERRIN SERPL-MCNC: 353 MG/DL (ref 210–360)
WBC # BLD AUTO: 6.5 10E9/L (ref 4–11)

## 2018-10-26 ENCOUNTER — TELEPHONE (OUTPATIENT)
Dept: FAMILY MEDICINE | Facility: CLINIC | Age: 38
End: 2018-10-26

## 2018-10-26 DIAGNOSIS — O09.529 SUPERVISION OF HIGH-RISK PREGNANCY OF ELDERLY MULTIGRAVIDA: ICD-10-CM

## 2018-10-26 RX ORDER — ACETAMINOPHEN 325 MG/1
650 TABLET ORAL
Status: CANCELLED
Start: 2018-10-26

## 2018-10-26 RX ORDER — DIPHENHYDRAMINE HCL 25 MG
25 CAPSULE ORAL
Status: CANCELLED | OUTPATIENT
Start: 2018-10-26

## 2018-10-26 NOTE — TELEPHONE ENCOUNTER
Called patient via language line. Unable to connect with her via the . Left message asking her to call clinic back.     Would like to start her on IV iron infusion therapy given the results of her labwork. I have placed IV infusion therapy with Venofer for her in the event she calls back and is amenable to this. I don't think oral iron will be sufficient for her at this point.    Does have appointment with Dr. Mahan for 11/07. Can also discuss this in more detail at that point.    Hugh Trevino MD

## 2018-10-26 NOTE — TELEPHONE ENCOUNTER
Discussed advising iron infusions with Azul. She is reluctant to start because she had not started oral iron until she saw me previously. I encouraged her to discuss with provider on 11/7 but that I think she would need to do the iron infusions as I did not think she would respond adequately with oral iron only.    Also advised she start B12, vitamin C, folic acid supplementation as well but that she could discuss in more detail with next provider (Kalli).    She expressed understanding and will discuss therapy with provider on 11/7.    As noted previously, infusion therapy already ordered if she would like to commence with treatment.    Hugh Trevino MD   [id number] Detailed

## 2018-10-26 NOTE — TELEPHONE ENCOUNTER
Patient returned call. Relayed message below. Patient would like to talk with  regarding this.     Kenna So CMA

## 2018-11-16 ENCOUNTER — OFFICE VISIT (OUTPATIENT)
Dept: FAMILY MEDICINE | Facility: CLINIC | Age: 38
End: 2018-11-16
Payer: MEDICAID

## 2018-11-16 VITALS
SYSTOLIC BLOOD PRESSURE: 104 MMHG | BODY MASS INDEX: 28.44 KG/M2 | HEART RATE: 94 BPM | RESPIRATION RATE: 18 BRPM | OXYGEN SATURATION: 100 % | DIASTOLIC BLOOD PRESSURE: 68 MMHG | WEIGHT: 164.4 LBS

## 2018-11-16 DIAGNOSIS — D50.9 IRON DEFICIENCY ANEMIA, UNSPECIFIED IRON DEFICIENCY ANEMIA TYPE: Primary | ICD-10-CM

## 2018-11-16 DIAGNOSIS — R21 RASH: ICD-10-CM

## 2018-11-16 LAB
FERRITIN SERPL-MCNC: 10 NG/ML (ref 12–150)
HBA1C MFR BLD: 5.3 % (ref 4.1–5.7)
HEMOGLOBIN: 10.4 G/DL (ref 11.7–15.7)
IRON SATN MFR SERPL: 84 % (ref 15–46)
IRON SERPL-MCNC: 401 UG/DL (ref 35–180)
TIBC SERPL-MCNC: 476 UG/DL (ref 240–430)
TRANSFERRIN SERPL-MCNC: 327 MG/DL (ref 210–360)

## 2018-11-16 RX ORDER — HYDROCORTISONE VALERATE 2 MG/G
OINTMENT TOPICAL
Qty: 45 G | Refills: 0 | Status: ON HOLD | OUTPATIENT
Start: 2018-11-16 | End: 2018-12-19

## 2018-11-16 NOTE — NURSING NOTE
Due to patient being non-English speaking/uses sign language, an  was used for this visit. Only for face-to-face interpretation by an external agency, date and length of interpretation can be found on the scanned worksheet.     name: [employer]  Agency: [employer]  Language: Kazakh   Telephone number [phone]  Type of interpretation: Face-to-face, spoken   AMENA Hanson

## 2018-11-16 NOTE — PROGRESS NOTES
Return OB visit  35-39 weeks    Subjective:   Julio Cesar is a 38 year old  female at 35w6d who returns for prenatal care. ELZA Dec 15, 2018.  - Concerns today: anemia and sleep. She feels weak and is getting up throughout the night to urinate.  - Patient reports no vaginal bleeding, no leakage of fluid. Contractions yes, intermittent. Fetal movement is present.   - No vaginal discharge. No dysuria.   - No headache, vision changes, lower extremity swelling, upper abdominal pain, chest pain, shortness of breath.   - Mood has been good.    Objective:   /68  Pulse 94  Resp 18  Wt 164 lb 6.4 oz (74.6 kg)  LMP 2018 (Exact Date)  SpO2 100%  BMI 28.44 kg/m2   Const: No distress  Abd: Gravid.   See flowsheet for FH, FHTs, fetal presentation, EFW, edema.     Prenatal labs:   Hemoglobin   Date Value Ref Range Status   10/18/2018 9.2 (L) 11.7 - 15.7 g/dL Final       Assessment & plan:   IUP at 35w6d weeks by first trimester ultrasound (LMP discrepancy 4wks).     - Pregnancy complicated by: advanced maternal age and grandmultiparity  - Prenatal labs reviewed. Patient did pass 1 hour GCT. Fetal survey showed no abnormalities requiring follow up ultrasound.   - GBS not collected today, will obtain at next appointment. 3rd trimester Hb obtained today (10.4), she will continue her oral iron.  - Patient is measuring appropriately for gestational age. Pregnancy weight gain has been 6.4 oz (0.181 kg) to date, which is inadequate.   - Patient plans to breast feed and use unsure for contraception after delivery.  - Patient will continue taking prenatal vitamins and avoiding cigarettes & alcohol. Tdap at last appointment and flu shot given previously at another clinic.   - Discussed  care & plan for vaccinations.   - Provided counseling regarding labor signs, hospital readiness (transportation, bags packed), pain control options during labor.     -Breastfeeding education provided:  - Supporting a non-medicated  birth  - Skin to Skin  - Non-separation of mother and baby    - Return to clinic in 1 week.    - Specific concerns addressed today:   1. Iron deficiency anemia, unspecified iron deficiency anemia type  - taking ferrous sulfate 325mg once daily with breakfast  - Hemoglobin (10.4 today from 9.2)    Options for treatment and follow-up care were reviewed with the patient and/or guardian. Julio Cesar Cha and/or guardian engaged in the decision making process and verbalized understanding of the options discussed and agreed with the final plan.    Nicola Hubbard, MS3  [employer] [facility] [employer]    Ying Mahan MD

## 2018-11-16 NOTE — MR AVS SNAPSHOT
After Visit Summary   2018    Julio Cesar Cha    MRN: [id number]           Patient Information     Date Of Birth          1980        Visit Information        Provider Department      2018 4:20 PM Ying Mahan MD \[facility]\"" [facility]        Today's Diagnoses     Iron deficiency anemia, unspecified iron deficiency anemia type    -  1    Rash           Follow-ups after your visit        Who to contact     Please call your clinic at [phone] to:    Ask questions about your health    Make or cancel appointments    Discuss your medicines    Learn about your test results    Speak to your doctor            Additional Information About Your Visit        MyChart Information     Recorded Futuret is an electronic gateway that provides easy, online access to your medical records. With EMBI, you can request a clinic appointment, read your test results, renew a prescription or communicate with your care team.     To sign up for Recorded Futuret visit the website at [email or web address] Twitter [email or web address]   You will be asked to enter the access code listed below, as well as some personal information. Please follow the directions to create your username and password.     Your access code is: [email or web address]  Expires: 2019  5:12 PM     Your access code will  in 90 days. If you need help or a new code, please contact your [facility] or call [phone] for assistance.        Care EveryWhere ID     This is your Care EveryWhere ID. This could be used by other organizations to access your [facility] medical records  [id number]        Your Vitals Were     Pulse Respirations Last Period Pulse Oximetry BMI (Body Mass Index)       94 18 2018 (Exact Date) 100% 28.44 kg/m2        Blood Pressure from Last 3 Encounters:   18 104/68   10/18/18 105/67   10/17/18 106/58    Weight from Last 3 Encounters:   18 164 lb 6.4 oz (74.6 kg)   10/18/18 164 lb 6.4  oz (74.6 kg)   10/17/18 163 lb (73.9 kg)              We Performed the Following     Ferritin     Hemoglobin (HGB) ([facility])     Hemoglobin A1c (Merged with [facility])     IRON AND IRON BINDING CAPACITY     Transferrin          Where to get your medicines      These medications were sent to [facility] [address], [address]  [address], [address], [facility] [address]     Phone:  [phone]     hydrocortisone valerate 0.2 % ointment          Primary Care Provider Office Phone # Fax #    Ying Mahan [phone]       [address] [address]        Equal Access to Services     MELODIE BAUTISTA : Jabier Thomson, waamanda kendrick, qaybta kaalmaaaron forte, clemencia friedman . So [facility] [phone].    ATENCIÓN: Si habla español, tiene a ravi disposición servicios gratuitos de asistencia lingüística. [facility] [phone].    We comply with applicable federal civil rights laws and [place] laws. We do not discriminate on the basis of race, color, national origin, age, disability, sex, sexual orientation, or gender identity.            Thank you!     Thank you for choosing [facility]  for your care. Our goal is always to provide you with excellent care. Hearing back from our patients is one way we can continue to improve our services. Please take a few minutes to complete the written survey that you may receive in the mail after your visit with us. Thank you!             Your Updated Medication List - Protect others around you: Learn how to safely use, store and throw away your medicines at www.disposemymeds.org.          This list is accurate as of 11/16/18  5:12 PM.  Always use your most recent med list.                   Brand Name Dispense Instructions for use Diagnosis    acetaminophen 500 MG tablet    TYLENOL    100 tablet    Take 2 tablets (1,000 mg) by mouth every 6 hours as  needed for mild pain    Pregnancy headache in first trimester       ferrous sulfate 325 (65 Fe) MG tablet    IRON    90 tablet    Take 1 tablet (325 mg) by mouth daily (with breakfast)    Iron deficiency anemia, unspecified iron deficiency anemia type       hydrocortisone valerate 0.2 % ointment    WEST-GABRIEL    45 g    Apply sparingly to affected area three times daily as needed.    Rash       prenatal multivitamin  with iron 28-0.8 MG Tabs     90 each    Take 1 tablet by mouth daily    Pregnancy test positive       VITAMIN D (CHOLECALCIFEROL) PO      Take by mouth daily

## 2018-11-17 NOTE — PROGRESS NOTES
Preceptor Attestation:  I was present with the medical student who participated in the service and in the documentation of this note. I have verified the history and personally performed the physical exam and medical decision making. I have verified the content of the note, which accurately reflects my assessment of the patient and the plan of care.   Supervising Physician:  Ying Mahan MD

## 2018-11-23 ENCOUNTER — OFFICE VISIT (OUTPATIENT)
Dept: FAMILY MEDICINE | Facility: CLINIC | Age: 38
End: 2018-11-23
Payer: MEDICAID

## 2018-11-23 VITALS
DIASTOLIC BLOOD PRESSURE: 61 MMHG | WEIGHT: 162.6 LBS | BODY MASS INDEX: 28.13 KG/M2 | HEART RATE: 90 BPM | OXYGEN SATURATION: 99 % | SYSTOLIC BLOOD PRESSURE: 92 MMHG | TEMPERATURE: 97.9 F

## 2018-11-23 DIAGNOSIS — O09.529 SUPERVISION OF HIGH-RISK PREGNANCY OF ELDERLY MULTIGRAVIDA: Primary | ICD-10-CM

## 2018-11-23 NOTE — PROGRESS NOTES
"Return OB visit  35-39 weeks    Subjective:   Julio Cesar is a 38 year old  female at 36w6d who returns for prenatal care. ELZA Dec 15, 2018.  - Concerns today: none  - Patient reports no vaginal bleeding, no leakage of fluid. Aditya Gong. Contractions none. Fetal movement is present.   - No vaginal discharge. No dysuria.   - No headache, vision changes, lower extremity swelling, upper abdominal pain, chest pain, shortness of breath.   - Mood has been \"fine.\"    Objective:   BP 92/61  Pulse 90  Temp 97.9  F (36.6  C) (Oral)  Wt 162 lb 9.6 oz (73.8 kg)  LMP 2018 (Exact Date)  SpO2 99%  BMI 28.13 kg/m2   Const: No distress  Abd: Gravid.   See flowsheet for FH, FHTs, fetal presentation, EFW, edema.     Prenatal labs:   Hemoglobin   Date Value Ref Range Status   2018 10.4 (L) 11.7 - 15.7 g/dL Final       Assessment & plan:   IUP at 36w6d weeks by first trimester ultrasound not consistent wit LMP.     - Pregnancy complicated by: AMA and grandmultiparity  - Prenatal labs reviewed. Patient did pass 1 hour GCT. Fetal survey showed no abnormalities requiring follow up ultrasound.   - GBS obtained today, found to be POSITIVE. 3rd trimester Hb obtained at prior appointment.   - Patient is measuring appropriately for gestational age. Pregnancy weight gain has been -1 lb 6.4 oz (-0.635 kg) to date, which is not adequate. Will continue to monitor.   - Patient plans to breast feed and use Depo for contraception after delivery.   - Discussed  care & plan for vaccinations.   - Provided counseling regarding labor signs and hospital readiness (transportation, bags packed).  - Patient will continue taking prenatal vitamins and avoiding cigarettes & alcohol. Tdap and flu shot have been given.     -Breastfeeding education provided:  - Supporting a non-medicated birth  - Skin to Skin  - Non-separation of mother and baby  - Return to clinic in 1 week.    Options for treatment and follow-up care were reviewed " with the patient and/or guardian. Julio Cesar Cha engaged in the decision making process and verbalized understanding of the options discussed and agreed with the final plan.    Tashia Loaiza MD  [facility] Resident PGY-2  [phone]    Those present during this appointment were: patient, myself and

## 2018-11-23 NOTE — MR AVS SNAPSHOT
After Visit Summary   2018    Julio Cesar Cha    MRN: [id number]           Patient Information     Date Of Birth          1980        Visit Information        Provider Department      2018 1:00 PM Tashia Loaiza MD [facility]        Today's Diagnoses     Supervision of high-risk pregnancy of elderly multigravida    -  1       Follow-ups after your visit        Your next 10 appointments already scheduled     2018  1:00 PM CST   [facility]     [address],  [address] [place] [address]   [phone]            Dec 07, 2018  1:20 PM CST   RETURN OB with Ying Mahan MD   [facility] ([facility])     [address],  [place] [address]   [phone]            Dec 14, 2018  1:00 PM CST   RETURN OB with [facility]   [facility]     [address],  [place] [address]   [phone]              Who to contact     Please call your clinic at [phone] to:    Ask questions about your health    Make or cancel appointments    Discuss your medicines    Learn about your test results    Speak to your doctor            Additional Information About Your Visit        MyChart Information     Lithium Technologiest is an electronic gateway that provides easy, online access to your medical records. With Sunshine, you can request a clinic appointment, read your test results, renew a prescription or communicate with your care team.     To sign up for Lithium Technologiest visit the website at [email or web address]   You will be asked to enter the access code listed below, as well as some personal information. Please follow the directions to create your username and password.     Your access code is: [email or web address]  Expires: 2019  5:12 PM     Your access code will  in  90 days. If you need help or a new code, please contact your [facility] or call [phone] for assistance.        Care EveryWhere ID     This is your Care EveryWhere ID. This could be used by other organizations to access your [facility] medical records  [id number]        Your Vitals Were     Pulse Temperature Last Period Pulse Oximetry BMI (Body Mass Index)       90 97.9  F (36.6  C) (Oral) 04/08/2018 (Exact Date) 99% 28.13 kg/m2        Blood Pressure from Last 3 Encounters:   11/23/18 92/61   11/16/18 104/68   10/18/18 105/67    Weight from Last 3 Encounters:   11/23/18 162 lb 9.6 oz (73.8 kg)   11/16/18 164 lb 6.4 oz (74.6 kg)   10/18/18 164 lb 6.4 oz (74.6 kg)              We Performed the Following     Group B strep PCR (GBS)      : Sign Language or Oral - 23-37 minutes     Referral sensitivity        Primary Care Provider Office Phone # Fax #    Ying Mahan [phone]       [address] [address]        Equal Access to Services     MELODIE BAUTISTA : Hadii eli cantrello Solc, waaxda luqadaha, qaybta kaalmada adeoniel, clemencia blum. So [facility] [phone].    ATENCIÓN: Si habla español, tiene a ravi disposición servicios gratuitos de asistencia lingüística. Raghav al [phone].    We comply with applicable federal civil rights laws and [place] laws. We do not discriminate on the basis of race, color, national origin, age, disability, sex, sexual orientation, or gender identity.            Thank you!     Thank you for choosing [facility]  for your care. Our goal is always to provide you with excellent care. Hearing back from our patients is one way we can continue to improve our services. Please take a few minutes to complete the written survey that you may receive in the mail after your visit with us. Thank you!             Your Updated Medication List - Protect  others around you: Learn how to safely use, store and throw away your medicines at www.disposemymeds.org.          This list is accurate as of 11/23/18 11:59 PM.  Always use your most recent med list.                   Brand Name Dispense Instructions for use Diagnosis    acetaminophen 500 MG tablet    TYLENOL    100 tablet    Take 2 tablets (1,000 mg) by mouth every 6 hours as needed for mild pain    Pregnancy headache in first trimester       ferrous sulfate 325 (65 Fe) MG tablet    FEROSUL    90 tablet    Take 1 tablet (325 mg) by mouth daily (with breakfast)    Iron deficiency anemia, unspecified iron deficiency anemia type       hydrocortisone valerate 0.2 % external ointment    WEST-GABRIEL    45 g    Apply sparingly to affected area three times daily as needed.    Rash       prenatal multivitamin  with iron 28-0.8 MG Tabs     90 each    Take 1 tablet by mouth daily    Pregnancy test positive       VITAMIN D (CHOLECALCIFEROL) PO      Take by mouth daily

## 2018-11-23 NOTE — NURSING NOTE
Due to patient being non-English speaking/uses sign language, an  was used for this visit. Only for face-to-face interpretation by an external agency, date and length of interpretation can be found on the scanned worksheet.     name: Honey merchant  Agency: [employer]  Language: Norwegian   Telephone number: [phone]  Type of interpretation: Face-to-face, spoken

## 2018-11-23 NOTE — PROGRESS NOTES
Preceptor Attestation:   Patient seen, evaluated and discussed with the resident. I have verified the content of the note, which accurately reflects my assessment of the patient and the plan of care.   Supervising Physician:  Bobo Wilhelm MD

## 2018-11-24 LAB
GP B STREP DNA SPEC QL NAA+PROBE: POSITIVE
SPECIMEN SOURCE: ABNORMAL

## 2018-11-29 LAB
BACTERIA SPEC CULT: ABNORMAL
SPECIMEN SOURCE: ABNORMAL

## 2018-11-30 ENCOUNTER — OFFICE VISIT (OUTPATIENT)
Dept: FAMILY MEDICINE | Facility: CLINIC | Age: 38
End: 2018-11-30
Payer: MEDICAID

## 2018-11-30 ENCOUNTER — TELEPHONE (OUTPATIENT)
Dept: FAMILY MEDICINE | Facility: CLINIC | Age: 38
End: 2018-11-30

## 2018-11-30 VITALS
DIASTOLIC BLOOD PRESSURE: 64 MMHG | SYSTOLIC BLOOD PRESSURE: 99 MMHG | BODY MASS INDEX: 28.34 KG/M2 | TEMPERATURE: 98.3 F | HEART RATE: 88 BPM | WEIGHT: 163.8 LBS | RESPIRATION RATE: 16 BRPM | OXYGEN SATURATION: 100 %

## 2018-11-30 DIAGNOSIS — B95.1 POSITIVE GBS TEST: ICD-10-CM

## 2018-11-30 DIAGNOSIS — D50.8 OTHER IRON DEFICIENCY ANEMIA: ICD-10-CM

## 2018-11-30 DIAGNOSIS — O26.13 POOR WEIGHT GAIN OF PREGNANCY, THIRD TRIMESTER: ICD-10-CM

## 2018-11-30 DIAGNOSIS — R35.0 URINARY FREQUENCY: Primary | ICD-10-CM

## 2018-11-30 DIAGNOSIS — O09.529 SUPERVISION OF HIGH-RISK PREGNANCY OF ELDERLY MULTIGRAVIDA: ICD-10-CM

## 2018-11-30 LAB
BILIRUBIN UR: NEGATIVE
BLOOD UR: NEGATIVE
GLUCOSE URINE: NEGATIVE
KETONES UR QL: NEGATIVE
LEUKOCYTE ESTERASE UR: NEGATIVE
NITRITE UR QL STRIP: NEGATIVE
PH UR STRIP: 7 [PH] (ref 5–7)
PROTEIN UR: NEGATIVE
SP GR UR STRIP: 1.01
UROBILINOGEN UR STRIP-ACNC: NORMAL

## 2018-11-30 NOTE — PROGRESS NOTES
Preceptor Attestation:   Patient seen, evaluated and discussed with the resident. I have verified the content of the note, which accurately reflects my assessment of the patient and the plan of care.   Supervising Physician:  Patricia Novoa MD

## 2018-11-30 NOTE — PROGRESS NOTES
"Return OB visit  35-39 weeks    Subjective:   Julio Cesar is a 38 year old  female at 37w6d who returns for prenatal care. ELZA Dec 15, 2018.  - Concerns today: Noting having to urinate a lot, but no true dysuria.  Patient also denies any hematuria  Patient has been noted to have low ferritin on recent lab workup and an IV Venofer infusion have been ordered.  She states that she has not been called nor can recall the discussion for IV iron. She is agreeable and would like to proceed with this.  - Patient reports no vaginal bleeding, no leakage of fluid. Contraction \"once\" but nothing consistent. Denies any Rancocas Gong. Fetal movement is present.   - No vaginal discharge. No dysuria.   - No headache, vision changes, lower extremity swelling, upper abdominal pain, chest pain, shortness of breath.   - Mood has been \"good.\"    Objective:   BP 99/64  Pulse 88  Temp 98.3  F (36.8  C) (Oral)  Resp 16  Wt 74.3 kg (163 lb 12.8 oz)  LMP 2018 (Exact Date)  SpO2 100%  BMI 28.34 kg/m2   Const: No distress  Abd: Gravid.   See flowsheet for FH, FHTs, fetal presentation, EFW, edema.     Prenatal labs:   Hemoglobin   Date Value Ref Range Status   2018 10.4 (L) 11.7 - 15.7 g/dL Final       Assessment & plan:   IUP at 37w6d weeks by first trimester ultrasound, not consistent with LMP.    - Pregnancy complicated by: AMA, iron deficiency anemia, and grand multiparity  - Prenatal labs reviewed. Patient did pass 1 hour GCT. Fetal survey showed no abnormalities requiring follow up ultrasound.   - GBS obtained  2018, POSITIVE. 3rd trimester Hb obtained at prior appointment.  Would repeat hemoglobin lab 2 weeks after last IV Venofer infusion.  - Patient is measuring appropriately for gestational age. Pregnancy weight gain has been -0.091 kg (-3.2 oz) to date, which is adequ we will continue to monitor. Patient reports poor oral intake as she is not hungry and has minimal interest in food.   - Patient plans to " breast feed and use Depo for contraception after delivery.   - Discussed  care & plan for vaccinations.   - Provided counseling regarding labor signs and hospital readiness (transportation, bags packed).   - Patient will continue taking prenatal vitamins and avoiding cigarettes & alcohol. Tdap and flu shot have been given.     -Breastfeeding education provided:  - Supporting a non-medicated birth  - Skin to Skin  - Non-separation of mother and baby    - Return to clinic in 1 week.    - Specific concerns addressed today:   1. Dysuria  Urine analysis today was unremarkable.  Reassured patient that urinary frequency is most likely secondary to increasing fetus size as the uterus tends to lie on top of the bladder and will cause increased urinary frequency later on in pregnancy.  - Urinalysis, Micro If (UA) ([facility])    2. Iron deficiency anemia  Patient is agreeable to IV infusion.  We will connect with care coordination to aid in scheduling of IV Venofer.    3. Supervision of high-risk pregnancy of elderly multigravida  Will return to clinic in 1 week for appropriate OB follow-up.  Did discuss with patient that she is GBS positive and that would require her to get IV antibiotics during labor.  If patient by chance does not complete the full antibiotic course discussed that baby would need to be watched in the hospital for at least 48 hours after delivery.    4. Poor weight gain of pregnancy, third trimester  Discussed with patient that she has not gained much weight if no weight during this pregnancy.  We will continue to monitor at this time.  She is measuring appropriately.  We will hold off in any reimaging at this time.  Highly encourage patient to eat a variety of food as much as she is able.    Options for treatment and follow-up care were reviewed with the patient. Julio Cesar Cha engaged in the decision making process and verbalized understanding of the options discussed and agreed with the  final plan.    Tashia Loaiza MD  [facility] Resident PGY-2  [phone]    Those present during this appointment were: patient, myself,   and 3-year-old son

## 2018-11-30 NOTE — Clinical Note
Patient needs help scheduling IV Venofer infusions.  She will need a [facility]  on the phone.  Any assistance with this would be greatly appreciated.  Thank you so much -YANN Loaiza

## 2018-11-30 NOTE — MR AVS SNAPSHOT
After Visit Summary   11/30/2018    Julio Cesar Cha    MRN: [id number]           Patient Information     Date Of Birth          1980        Visit Information        Provider Department      11/30/2018 1:00 PM Tashia Loaiza MD [facility]        Today's Diagnoses     Urinary frequency    -  1    Other iron deficiency anemia        Supervision of high-risk pregnancy of elderly multigravida        Poor weight gain of pregnancy, third trimester        Positive GBS test           Follow-ups after your visit        Your next 10 appointments already scheduled     Dec 07, 2018  1:20 PM CST   RETURN OB with [facility] ([facility])    [address] [place] [address],  [address]  [facility] [address]   [phone]            Dec 10, 2018  2:00 PM CST   Infusion 120 with UC SPEC INFUSION, UC 42 ATC   [facility] ([facility])    [address]  [address]  [facility] [id number] [phone]            Dec 12, 2018  3:00 PM CST   Infusion 120 with UC SPEC INFUSION, UC 50 ATC   [facility] ([facility])    [address]  [address]  [facility] [id number] [phone]            Dec 14, 2018  1:00 PM CST   RETURN OB with MD [facility] ([facility])    [address] [place] [address],  [address]  [facility] [address]   [phone]            Dec 14, 2018  2:00 PM CST   Infusion 120 with UC SPEC INFUSION, UC 46 ATC   [facility] ([facility])    [address]  [address]  [facility] [id number] [phone]              Who to contact     Please call your clinic at [phone] to:    Ask questions about your health    Make or cancel appointments    Discuss  your medicines    Learn about your test results    Speak to your doctor            Additional Information About Your Visit        MyChart Information     BancABChart is an electronic gateway that provides easy, online access to your medical records. With SYMIC BIOMEDICALt, you can request a clinic appointment, read your test results, renew a prescription or communicate with your care team.     To sign up for SYMIC BIOMEDICALt visit the website at [email or web address]   You will be asked to enter the access code listed below, as well as some personal information. Please follow the directions to create your username and password.     Your access code is: [email or web address]  Expires: 2019  5:12 PM     Your access code will  in 90 days. If you need help or a new code, please contact your [facility] or call [phone] for assistance.        Care EveryWhere ID     This is your Care EveryWhere ID. This could be used by other organizations to access your [facility] medical records  [id number]        Your Vitals Were     Pulse Temperature Respirations Last Period Pulse Oximetry BMI (Body Mass Index)    88 98.3  F (36.8  C) (Oral) 16 2018 (Exact Date) 100% 28.34 kg/m2       Blood Pressure from Last 3 Encounters:   18 99/64   18 92/61   18 104/68    Weight from Last 3 Encounters:   18 163 lb 12.8 oz (74.3 kg)   18 162 lb 9.6 oz (73.8 kg)   18 164 lb 6.4 oz (74.6 kg)              We Performed the Following      : Sign Language or Oral - 38-52 minutes     Urinalysis, Micro If (UA) ([facility])        Primary Care Provider Office Phone # Fax #    Ying Mahan [phone]       [address] [address]        Equal Access to Services     YANG BAUTISTA : Jabier Thomson, oni kendrick, clemencia villagomez . [facility] [phone].    ATENCIÓN: Si  corin hernandez, tiene a ravi disposición servicios gratuitos de asistencia lingüística. Raghav diamond [phone].    We comply with applicable federal civil rights laws and [place] laws. We do not discriminate on the basis of race, color, national origin, age, disability, sex, sexual orientation, or gender identity.            Thank you!     Thank you for choosing [facility]  for your care. Our goal is always to provide you with excellent care. Hearing back from our patients is one way we can continue to improve our services. Please take a few minutes to complete the written survey that you may receive in the mail after your visit with us. Thank you!             Your Updated Medication List - Protect others around you: Learn how to safely use, store and throw away your medicines at www.disposemymeds.org.          This list is accurate as of 11/30/18 11:59 PM.  Always use your most recent med list.                   Brand Name Dispense Instructions for use Diagnosis    acetaminophen 500 MG tablet    TYLENOL    100 tablet    Take 2 tablets (1,000 mg) by mouth every 6 hours as needed for mild pain    Pregnancy headache in first trimester       ferrous sulfate 325 (65 Fe) MG tablet    FEROSUL    90 tablet    Take 1 tablet (325 mg) by mouth daily (with breakfast)    Iron deficiency anemia, unspecified iron deficiency anemia type       hydrocortisone valerate 0.2 % external ointment    WEST-GABRIEL    45 g    Apply sparingly to affected area three times daily as needed.    Rash       prenatal multivitamin  with iron 28-0.8 MG Tabs     90 each    Take 1 tablet by mouth daily    Pregnancy test positive       VITAMIN D (CHOLECALCIFEROL) PO      Take by mouth daily

## 2018-11-30 NOTE — TELEPHONE ENCOUNTER
RN called pt via language line to help set up iron infusions. Left VM with name and callback number    Talya Haftield RN

## 2018-11-30 NOTE — NURSING NOTE
Due to patient being non-English speaking/uses sign language, an  was used for this visit. Only for face-to-face interpretation by an external agency, date and length of interpretation can be found on the scanned worksheet.     name: rubi merchant  Agency: [employer] Ju[employer]  Language: Malagasy   Telephone number: [phone]  Type of interpretation: Face-to-face, spoken

## 2018-12-03 NOTE — TELEPHONE ENCOUNTER
2nd attempt to reach pt via language line. Left VM with name and callback number. Please transfer to RN if pt calls back    Talya Hatfield RN

## 2018-12-10 ENCOUNTER — TELEPHONE (OUTPATIENT)
Dept: FAMILY MEDICINE | Facility: CLINIC | Age: 38
End: 2018-12-10

## 2018-12-10 ENCOUNTER — INFUSION THERAPY VISIT (OUTPATIENT)
Dept: INFUSION THERAPY | Facility: CLINIC | Age: 38
End: 2018-12-10
Attending: FAMILY MEDICINE
Payer: COMMERCIAL

## 2018-12-10 VITALS
DIASTOLIC BLOOD PRESSURE: 58 MMHG | SYSTOLIC BLOOD PRESSURE: 94 MMHG | RESPIRATION RATE: 18 BRPM | HEART RATE: 78 BPM | OXYGEN SATURATION: 100 % | TEMPERATURE: 98.2 F

## 2018-12-10 DIAGNOSIS — O09.529 SUPERVISION OF HIGH-RISK PREGNANCY OF ELDERLY MULTIGRAVIDA: ICD-10-CM

## 2018-12-10 DIAGNOSIS — D50.8 OTHER IRON DEFICIENCY ANEMIA: Primary | ICD-10-CM

## 2018-12-10 PROCEDURE — 96365 THER/PROPH/DIAG IV INF INIT: CPT

## 2018-12-10 PROCEDURE — 25000128 H RX IP 250 OP 636: Mod: ZF | Performed by: FAMILY MEDICINE

## 2018-12-10 PROCEDURE — 96366 THER/PROPH/DIAG IV INF ADDON: CPT

## 2018-12-10 RX ORDER — ACETAMINOPHEN 325 MG/1
650 TABLET ORAL
Status: CANCELLED
Start: 2018-12-10

## 2018-12-10 RX ORDER — DIPHENHYDRAMINE HCL 25 MG
25 CAPSULE ORAL
Status: CANCELLED | OUTPATIENT
Start: 2018-12-10

## 2018-12-10 RX ADMIN — IRON SUCROSE 300 MG: 20 INJECTION, SOLUTION INTRAVENOUS at 15:19

## 2018-12-10 ASSESSMENT — PAIN SCALES - GENERAL: PAINLEVEL: NO PAIN (0)

## 2018-12-10 NOTE — PROGRESS NOTES
Nursing Note  Julio Cesar Cha presents [facility] for:   Chief Complaint   Patient presents with     Infusion     Venofer     During today's [facility] appointment, orders from Dr. Trevino were completed.  Frequency: today is #dose 1 of 3 total.    Progress note:  Patient identification verified by name and date of birth.  Assessment completed.  Vitals recorded in Doc Flowsheets.  Patient was provided with education regarding infusion and possible side effects.  Patient verbalized understanding.      needed: Yes  Premedications: ordered for hx of reaction only. Patient has no hx of reaction therefore not given.  Infusion Rates: infusion given over approximately 90 minutes.  Labs: were not ordered for this appointment.  Vascular access: peripheral IV placed today.  Treatment Conditions: non-applicable.  Patient tolerated infusion: well.    Discharge Plan:   Follow up plan of care with: ongoing infusions at [facility].  Discharge instructions were reviewed with patient.  Patient/representative verbalized understanding of discharge instructions and all questions answered.  Patient discharged from [facility] in stable condition.    Ericka Severson, RN        /63   Pulse 86   Temp 98.2  F (36.8  C) (Oral)   Resp 18   LMP 04/08/2018 (Exact Date)   SpO2 100%

## 2018-12-10 NOTE — TELEPHONE ENCOUNTER
RN called pt via language line to remind of iron infusion appt at [facility]. Left  with name and callback number    Talya Hatfield RN

## 2018-12-10 NOTE — PATIENT INSTRUCTIONS
Dear Julio Cesar Cha    Thank you for choosing [facility] ([facility]) for your infusion.  The following information is a summary of our appointment as well as important reminders.          We look forward in seeing you on your next appointment here at [facility].  Please don t hesitate to call us at [phone] to reschedule any of your appointments or to speak with one of the [facility] registered nurses.  It was a pleasure taking care of you today.    Sincerely,    [facility]  [facility]  [address] [place] [address]  [address]  Phone:  [phone]    Patient Education     Iron Sucrose injection  Brand Name: Venofer  What is this medicine?  IRON SUCROSE (DILMAY lu TOÑO krohs) is an iron complex. Iron is used to make healthy red blood cells, which carry oxygen and nutrients throughout the body. This medicine is used to treat iron deficiency anemia in people with chronic kidney disease.  How should I use this medicine?  This medicine is for infusion into a vein. It is given by a health care professional in a hospital or clinic setting.  Talk to your pediatrician regarding the use of this medicine in children. While this drug may be prescribed for children as young as 2 years for selected conditions, precautions do apply.  What side effects may I notice from receiving this medicine?  Side effects that you should report to your doctor or health care professional as soon as possible:    allergic reactions like skin rash, itching or hives, swelling of the face, lips, or tongue    breathing problems    changes in blood pressure    cough    fast, irregular heartbeat    feeling faint or lightheaded, falls    fever or chills    flushing, sweating, or hot feelings    joint or muscle aches/pains    seizures    swelling of the ankles or feet    unusually weak or tired  Side effects that usually do not require  medical attention (report to your doctor or health care professional if they continue or are bothersome):    diarrhea    feeling achy    headache    irritation at site where injected    nausea, vomiting    stomach upset    tiredness  What may interact with this medicine?  Do not take this medicine with any of the following medications:    deferoxamine    dimercaprol    other iron products  This medicine may also interact with the following medications:    chloramphenicol    deferasirox  What if I miss a dose?  It is important not to miss your dose. Call your doctor or health care professional if you are unable to keep an appointment.  Where should I keep my medicine?  This drug is given in a hospital or clinic and will not be stored at home.  What should I tell my health care provider before I take this medicine?  They need to know if you have any of these conditions:    anemia not caused by low iron levels    heart disease    high levels of iron in the blood    kidney disease    liver disease    an unusual or allergic reaction to iron, other medicines, foods, dyes, or preservatives    pregnant or trying to get pregnant    breast-feeding  What should I watch for while using this medicine?  Visit your doctor or healthcare professional regularly. Tell your doctor or healthcare professional if your symptoms do not start to get better or if they get worse. You may need blood work done while you are taking this medicine.  You may need to follow a special diet. Talk to your doctor. Foods that contain iron include: whole grains/cereals, dried fruits, beans, or peas, leafy green vegetables, and organ meats (liver, kidney).  NOTE:This sheet is a summary. It may not cover all possible information. If you have questions about this medicine, talk to your doctor, pharmacist, or health care provider. Copyright  2018 [employer]

## 2018-12-12 ENCOUNTER — INFUSION THERAPY VISIT (OUTPATIENT)
Dept: INFUSION THERAPY | Facility: CLINIC | Age: 38
End: 2018-12-12
Attending: FAMILY MEDICINE
Payer: COMMERCIAL

## 2018-12-12 VITALS
DIASTOLIC BLOOD PRESSURE: 68 MMHG | OXYGEN SATURATION: 100 % | HEART RATE: 86 BPM | RESPIRATION RATE: 16 BRPM | TEMPERATURE: 98.6 F | SYSTOLIC BLOOD PRESSURE: 105 MMHG

## 2018-12-12 DIAGNOSIS — D50.8 OTHER IRON DEFICIENCY ANEMIA: Primary | ICD-10-CM

## 2018-12-12 DIAGNOSIS — O09.529 SUPERVISION OF HIGH-RISK PREGNANCY OF ELDERLY MULTIGRAVIDA: ICD-10-CM

## 2018-12-12 PROCEDURE — 96365 THER/PROPH/DIAG IV INF INIT: CPT

## 2018-12-12 PROCEDURE — 96366 THER/PROPH/DIAG IV INF ADDON: CPT

## 2018-12-12 PROCEDURE — 25000128 H RX IP 250 OP 636: Mod: ZF | Performed by: FAMILY MEDICINE

## 2018-12-12 RX ORDER — ACETAMINOPHEN 325 MG/1
650 TABLET ORAL
Status: CANCELLED
Start: 2018-12-12

## 2018-12-12 RX ORDER — DIPHENHYDRAMINE HCL 25 MG
25 CAPSULE ORAL
Status: CANCELLED | OUTPATIENT
Start: 2018-12-12

## 2018-12-12 RX ADMIN — IRON SUCROSE 300 MG: 20 INJECTION, SOLUTION INTRAVENOUS at 15:06

## 2018-12-12 NOTE — PROGRESS NOTES
Nursing Note  Julio Cesar Cha presents [facility] for:   Chief Complaint   Patient presents with     Infusion     Venofer     During [facility]'s [facility] appointment, orders from  were completed.  Frequency: today is dose 2 of 3 total.    Progress note:  Patient identification verified by name and date of birth.  Assessment completed.  Vitals recorded in Doc Flowsheets.  Patient was provided with education regarding infusion and possible side effects.  Patient verbalized understanding.      needed: Yes  Premedications: were not ordered.  Infusion Rates: 193ml/hr.  Approximate Infusion length:90 minutes.   Labs: were not ordered for this appointment.  Vascular access: peripheral IV placed today.  Treatment Conditions: patient denies fever, chills, signs of infection, recent illness, on antibiotics, productive cough or elevated temperature.  Patient tolerated infusion: well.    Drug Waste Record? No     Discharge Plan:   Follow up plan of care with: ongoing infusions at [facility].  Discharge instructions were reviewed with patient.  Patient/representative verbalized understanding of discharge instructions and all questions answered.  Patient discharged from [facility] in stable condition.    Harika Puri RN       Administrations This Visit     iron sucrose (VENOFER) 300 mg in sodium chloride 0.9 % 250 mL intermittent infusion     Admin Date  12/12/2018 Action  New Bag Dose  300 mg Rate  193.3 mL/hr Route  Intravenous Administered By  Harika Puri RN

## 2018-12-12 NOTE — PATIENT INSTRUCTIONS
Dear Julio Cesar Cha    Thank you for choosing [facility] ([facility]) for your infusion.  The following information is a summary of our appointment as well as important reminders.        We look forward in seeing you on your next appointment here at [facility].  Please don t hesitate to call us at [phone] to reschedule any of your appointments or to speak with one of the [facility] registered nurses.  It was a pleasure taking care of you today.    Sincerely,    [facility]  [facility]  [address] [place] [address]  [address]  Phone:  [phone]  Patient Education     Iron Sucrose injection  Brand Name: Venofer  What is this medicine?  IRON SUCROSE (DILMAY lu TOÑO krohs) is an iron complex. Iron is used to make healthy red blood cells, which carry oxygen and nutrients throughout the body. This medicine is used to treat iron deficiency anemia in people with chronic kidney disease.  How should I use this medicine?  This medicine is for infusion into a vein. It is given by a health care professional in a hospital or clinic setting.  Talk to your pediatrician regarding the use of this medicine in children. While this drug may be prescribed for children as young as 2 years for selected conditions, precautions do apply.  What side effects may I notice from receiving this medicine?  Side effects that you should report to your doctor or health care professional as soon as possible:    allergic reactions like skin rash, itching or hives, swelling of the face, lips, or tongue    breathing problems    changes in blood pressure    cough    fast, irregular heartbeat    feeling faint or lightheaded, falls    fever or chills    flushing, sweating, or hot feelings    joint or muscle aches/pains    seizures    swelling of the ankles or feet    unusually weak or tired  Side effects that usually do not require medical  attention (report to your doctor or health care professional if they continue or are bothersome):    diarrhea    feeling achy    headache    irritation at site where injected    nausea, vomiting    stomach upset    tiredness  What may interact with this medicine?  Do not take this medicine with any of the following medications:    deferoxamine    dimercaprol    other iron products  This medicine may also interact with the following medications:    chloramphenicol    deferasirox  What if I miss a dose?  It is important not to miss your dose. Call your doctor or health care professional if you are unable to keep an appointment.  Where should I keep my medicine?  This drug is given in a hospital or clinic and will not be stored at home.  What should I tell my health care provider before I take this medicine?  They need to know if you have any of these conditions:    anemia not caused by low iron levels    heart disease    high levels of iron in the blood    kidney disease    liver disease    an unusual or allergic reaction to iron, other medicines, foods, dyes, or preservatives    pregnant or trying to get pregnant    breast-feeding  What should I watch for while using this medicine?  Visit your doctor or healthcare professional regularly. Tell your doctor or healthcare professional if your symptoms do not start to get better or if they get worse. You may need blood work done while you are taking this medicine.  You may need to follow a special diet. Talk to your doctor. Foods that contain iron include: whole grains/cereals, dried fruits, beans, or peas, leafy green vegetables, and organ meats (liver, kidney).  NOTE:This sheet is a summary. It may not cover all possible information. If you have questions about this medicine, talk to your doctor, pharmacist, or health care provider. Copyright  2018 [employer]

## 2018-12-14 ENCOUNTER — INFUSION THERAPY VISIT (OUTPATIENT)
Dept: INFUSION THERAPY | Facility: CLINIC | Age: 38
End: 2018-12-14
Attending: FAMILY MEDICINE
Payer: COMMERCIAL

## 2018-12-14 VITALS
OXYGEN SATURATION: 100 % | TEMPERATURE: 98.2 F | RESPIRATION RATE: 16 BRPM | DIASTOLIC BLOOD PRESSURE: 64 MMHG | HEART RATE: 87 BPM | SYSTOLIC BLOOD PRESSURE: 102 MMHG

## 2018-12-14 DIAGNOSIS — D50.8 OTHER IRON DEFICIENCY ANEMIA: Primary | ICD-10-CM

## 2018-12-14 DIAGNOSIS — O09.529 SUPERVISION OF HIGH-RISK PREGNANCY OF ELDERLY MULTIGRAVIDA: ICD-10-CM

## 2018-12-14 PROCEDURE — 25000128 H RX IP 250 OP 636: Mod: ZF | Performed by: FAMILY MEDICINE

## 2018-12-14 PROCEDURE — 96365 THER/PROPH/DIAG IV INF INIT: CPT

## 2018-12-14 PROCEDURE — 96366 THER/PROPH/DIAG IV INF ADDON: CPT

## 2018-12-14 RX ORDER — DIPHENHYDRAMINE HCL 25 MG
25 CAPSULE ORAL
Status: CANCELLED | OUTPATIENT
Start: 2018-12-14

## 2018-12-14 RX ORDER — ACETAMINOPHEN 325 MG/1
650 TABLET ORAL
Status: CANCELLED
Start: 2018-12-14

## 2018-12-14 RX ADMIN — IRON SUCROSE 300 MG: 20 INJECTION, SOLUTION INTRAVENOUS at 14:39

## 2018-12-14 NOTE — PATIENT INSTRUCTIONS
Dear Julio Cesar Cha    Thank you for choosing [facility] ([facility]) for your infusion.  The following information is a summary of our appointment as well as important reminders.    Patient Education     Iron Sucrose injection  Brand Name: Venofer  What is this medicine?  IRON SUCROSE (ZAIN lagunas) is an iron complex. Iron is used to make healthy red blood cells, which carry oxygen and nutrients throughout the body. This medicine is used to treat iron deficiency anemia in people with chronic kidney disease.  How should I use this medicine?  This medicine is for infusion into a vein. It is given by a health care professional in a hospital or clinic setting.  Talk to your pediatrician regarding the use of this medicine in children. While this drug may be prescribed for children as young as 2 years for selected conditions, precautions do apply.  What side effects may I notice from receiving this medicine?  Side effects that you should report to your doctor or health care professional as soon as possible:    allergic reactions like skin rash, itching or hives, swelling of the face, lips, or tongue    breathing problems    changes in blood pressure    cough    fast, irregular heartbeat    feeling faint or lightheaded, falls    fever or chills    flushing, sweating, or hot feelings    joint or muscle aches/pains    seizures    swelling of the ankles or feet    unusually weak or tired  Side effects that usually do not require medical attention (report to your doctor or health care professional if they continue or are bothersome):    diarrhea    feeling achy    headache    irritation at site where injected    nausea, vomiting    stomach upset    tiredness  What may interact with this medicine?  Do not take this medicine with any of the following medications:    deferoxamine    dimercaprol    other iron products  This medicine may also interact with the  following medications:    chloramphenicol    deferasirox  What if I miss a dose?  It is important not to miss your dose. Call your doctor or health care professional if you are unable to keep an appointment.  Where should I keep my medicine?  This drug is given in a hospital or clinic and will not be stored at home.  What should I tell my health care provider before I take this medicine?  They need to know if you have any of these conditions:    anemia not caused by low iron levels    heart disease    high levels of iron in the blood    kidney disease    liver disease    an unusual or allergic reaction to iron, other medicines, foods, dyes, or preservatives    pregnant or trying to get pregnant    breast-feeding  What should I watch for while using this medicine?  Visit your doctor or healthcare professional regularly. Tell your doctor or healthcare professional if your symptoms do not start to get better or if they get worse. You may need blood work done while you are taking this medicine.  You may need to follow a special diet. Talk to your doctor. Foods that contain iron include: whole grains/cereals, dried fruits, beans, or peas, leafy green vegetables, and organ meats (liver, kidney).  NOTE:This sheet is a summary. It may not cover all possible information. If you have questions about this medicine, talk to your doctor, pharmacist, or health care provider. Copyright  2018 [employer]           We look forward in seeing you on your next appointment here at [facility].  Please don t hesitate to call us at [phone] to reschedule any of your appointments or to speak with one of the [facility] registered nurses.  It was a pleasure taking care of you today.    Sincerely,    [facility]  [facility]  [address] [place] [address]  [address]  Phone:  [phone]

## 2018-12-14 NOTE — PROGRESS NOTES
"Nursing Note  Julio Cesar Cha presents [facility] for:   Chief Complaint   Patient presents with     Infusion     Venofer     During [facility] appointment, orders from  were completed.  Frequency: today is dose 3 of 3 total.    Progress note:  Patient identification verified by name and date of birth.  Assessment completed.  Vitals recorded in Doc Flowsheets.  Patient was provided with education regarding infusion and possible side effects.  Patient verbalized understanding.      needed: Yes, present for visit.   Premedications: were not ordered.  Infusion Rates: 193ml/hr.  Approximate Infusion length:90 minutes.   Labs: were not ordered for this appointment.  Vascular access: peripheral IV placed today.  Treatment Conditions: patient denies fever, chills, signs of infection, recent illness, on antibiotics, productive cough or elevated temperature.  Patient tolerated infusion: well.    Drug Waste Record? No     Discharge Plan:   AVS given to patient.   Follow up plan of care with: ordering provider.   Discharge instructions were reviewed with patient.  Patient/representative verbalized understanding of discharge instructions and all questions answered.  [facility] discharged [facility] in stable condition.    Priti Reaves RN    Administrations This Visit     iron sucrose (VENOFER) 300 mg in sodium chloride 0.9 % 250 mL intermittent infusion     Admin Date  12/14/2018 Action  New Bag Dose  300 mg Rate  193.3 mL/hr Route  Intravenous Administered By  Priti Reaves RN               Vital signs:  Temp: 98.2  F (36.8  C) Temp src: Oral BP: 108/67 Pulse: 87   Resp: 16 SpO2: 100 % O2 Device: None (Room air)        Estimated body mass index is 28.34 kg/m  as calculated from the following:    Height as of 4/11/17: 1.619 m (5' 3.75\").    Weight as of 11/30/18: 74.3 kg (163 lb 12.8 " oz).

## 2018-12-17 ENCOUNTER — OFFICE VISIT (OUTPATIENT)
Dept: FAMILY MEDICINE | Facility: CLINIC | Age: 38
End: 2018-12-17
Payer: COMMERCIAL

## 2018-12-17 VITALS
TEMPERATURE: 98.1 F | BODY MASS INDEX: 28.2 KG/M2 | DIASTOLIC BLOOD PRESSURE: 69 MMHG | SYSTOLIC BLOOD PRESSURE: 106 MMHG | HEART RATE: 98 BPM | WEIGHT: 163 LBS | OXYGEN SATURATION: 99 %

## 2018-12-17 DIAGNOSIS — R30.0 DYSURIA: ICD-10-CM

## 2018-12-17 DIAGNOSIS — O09.529 SUPERVISION OF HIGH-RISK PREGNANCY OF ELDERLY MULTIGRAVIDA: ICD-10-CM

## 2018-12-17 DIAGNOSIS — O36.8130 DECREASED FETAL MOVEMENTS IN THIRD TRIMESTER, SINGLE OR UNSPECIFIED FETUS: ICD-10-CM

## 2018-12-17 DIAGNOSIS — D50.8 OTHER IRON DEFICIENCY ANEMIA: Primary | ICD-10-CM

## 2018-12-17 LAB
BILIRUBIN UR: NEGATIVE
BLOOD UR: NEGATIVE
GLUCOSE URINE: NEGATIVE
KETONES UR QL: NEGATIVE
LEUKOCYTE ESTERASE UR: NEGATIVE
NITRITE UR QL STRIP: NEGATIVE
PH UR STRIP: 7.5 [PH] (ref 5–7)
PROTEIN UR: NEGATIVE
SP GR UR STRIP: 1.01
UROBILINOGEN UR STRIP-ACNC: NORMAL

## 2018-12-17 NOTE — NURSING NOTE
Due to patient being non-English speaking/uses sign language, an  was used for this visit. Only for face-to-face interpretation by an external agency, date and length of interpretation can be found on the scanned worksheet.     name: nate tirado  Agency: [employer]  Language: Peruvian   Telephone number: [phone]  Type of interpretation: Face-to-face, spoken

## 2018-12-17 NOTE — PATIENT INSTRUCTIONS
If baby is not moving, please STOP, drink a full glass of water, and do kick counts. If you get to 10 in one hour, the baby is fine.   IF BABY DOES NOT MOVE 10 TIMES - please come directly to the hospital, labor and delivery     Because you are pregnant, we have additional resources for you:  - You may call and ask to speak with one of our clinic nurses at [phone] during normal business hours, for non-urgent questions about your pregnancy.  - Immediate OB help is also available 24 hours a day, seven days a week via the [facility] - [phone]  located at [address] [place] [address]                Pregnancy  1. If you feel like the baby isn't moving: drink a large glass of water and lay down on your left side. If you can count 10 movements, you're okay. If you count 5 or less, go to the hospital.   2. Follow up with me on Friday 12/21/18.

## 2018-12-17 NOTE — PROGRESS NOTES
Return OB visit  40-42 weeks    Subjective:   Julio Cesar is a 38 year old  female at 40w2d who returns for prenatal care. ELZA Dec 15, 2018.  - Concerns today: absent/reduced fetal movement  - Patient reports no vaginal bleeding, no leakage of fluid. Contractions Present. Fetal movement is reduced today and absent yesterday.   -  Positive for vaginal discharge, dysuria, burning vaginal pain.  - No headache, vision changes, lower extremity swelling, upper abdominal pain, chest pain, shortness of breath.   - Mood has been ***.    Pregnancy  Worries because she's not feeling the baby moving. Did not go to the hospital because it was late. There's vaginal mucus that looks like the mucus plug from other pregnancies. There was water accompanied with the mucus. No leaking or wet undergarments. Reduced fetal movement today, absent yesterday. Endorses dysuria, has had infections with this pregnancy. Has questions about iron levels. Three days ago after iron infusion she felt dizzy. Endorses episodes of pinching in the back that last for about three minutes.    Objective:   /69   Pulse 98   Temp 98.1  F (36.7  C) (Oral)   Wt 73.9 kg (163 lb)   LMP 2018 (Exact Date)   SpO2 99%   BMI 28.20 kg/m     Const: No distress  Abd: Gravid.   See flowsheet for FH, FHTs, fetal presentation, EFW, cervical check, edema.    Physical Exam:    BMI= Body mass index is 28.2 kg/m .   GENERAL: healthy, alert and no distress  SKIN: excoriations on the abd.  - female: cervix- normal, adnexae- normal; uterus- normal, no masses, no discharge    EFW 7.5, cervix is 3 cm dilated. 80% efaced, 80% thinned out -1.     Prenatal labs:   Hemoglobin   Date Value Ref Range Status   2018 10.4 (L) 11.7 - 15.7 g/dL Final       Assessment & plan:   IUP at 40w2d weeks by LMP *** consistent with first trimester ultrasound.     - Pregnancy complicated by: ***  - Prenatal labs reviewed. GBS ***. Repeat GBS obtained today (if initial GBS  "obtained > 5 weeks prior).   - Discussed post-dates management. ***Will start biweekly NSTs and weekly EVANGELIST at 41 weeks OR biweekly BPP. Discussed labor induction at 42 weeks.   - Patient is measuring ***appropriately for gestational age. Pregnancy weight gain has been -0.454 kg () to date, which is ***adequate.   - Patient plans to ***breast feed and use *** for contraception after delivery.   {add \"Encounter for Counseling Regarding Contraception\" or [id number] to problem list and list desired method in problem list overview}  - Discussed circumcision: ***  - Discussed labor signs, hospital readiness (transportation, bags packed).   - Patient will continue taking prenatal vitamins and avoiding cigarettes & alcohol. Tdap*** and flu shot given this pregnancy.     -Breastfeeding education provided:  {TRIMESTER?:162502}    {Use the following dot phrase to Baby Friendly talking tips  .BREASTFEEDINGTIPSFORNEWMOMS}    - Return to clinic in 1 week.     - Specific concerns addressed today:   1. Other iron deficiency anemia  ***    2. Supervision of high-risk pregnancy of elderly multigravida  ***      {Update Problem List Overview at EVERY visit}    Options for treatment and follow-up care were reviewed with the patient and/or guardian. Julio Cesar Cha and/or guardian engaged in the decision making process and verbalized understanding of the options discussed and agreed with the final plan.    Patricia Novoa MD    "

## 2018-12-17 NOTE — PROGRESS NOTES
Return OB visit  40-42 weeks    Subjective:   Julio Cesar is a 38 year old  female at 40w2d who returns for prenatal care. ELZA Dec 15, 2018.  - Concerns today: absent/reduced fetal movement  - Patient reports no vaginal bleeding, no leakage of fluid. Contractions Present. Fetal movement reduced today.   -  Positive for vaginal discharge, dysuria, burning vaginal pain.  - No headache, vision changes, lower extremity swelling, upper abdominal pain, chest pain, shortness of breath.   - Mood has been fine.     Pregnancy  Worries because she's not feeling the baby moving. Did not go to the hospital because it was late. There's vaginal mucus that looks like the mucus plug from other pregnancies. There was water accompanied with the mucus. No leaking or wet undergarments. Reduced fetal movement today, absent yesterday. Endorses dysuria, has had infections with this pregnancy. Has questions about iron levels. Three days ago after iron infusion she felt dizzy. Endorses episodes of pinching in the back that last for about three minutes.      This document serves as a record of the services and decisions personally performed and made by Patricia Novoa MD. It was created on his/her behalf by Beau Trejo, a trained medical scribe. The creation of this document is based the provider's statements to the medical scribe.  Scribe Beau Trejo 12:10 PM, 2018    Objective:   /69   Pulse 98   Temp 98.1  F (36.7  C) (Oral)   Wt 73.9 kg (163 lb)   LMP 2018 (Exact Date)   SpO2 99%   BMI 28.20 kg/m     Vital signs were reviewed, and were normal.    Const: No distress  Abd: Gravid.   See flowsheet for FH, FHTs, fetal presentation, EFW, cervical check, edema.    Physical Exam:     BMI= Body mass index is 28.2 kg/m .   GENERAL: healthy, alert and no distress  SKIN: excoriations on the abd.     Prenatal labs:   Hemoglobin   Date Value Ref Range Status   2018 10.4 (L) 11.7 - 15.7 g/dL Final        Assessment & plan:   IUP at 40w2d weeks by - Pregnancy complicated by: grandmultip  - Prenatal labs reviewed. GBS positive.  - Discussed post-dates management. Will start biweekly NSTs and weekly EVANGELIST at 41 weeks OR biweekly BPP. Discussed labor induction at 42 weeks. Will follow up on Friday if she remains pregnant to make a plan for next week. Has been induced before, but she's favorable on todays cervical exam. Consistency is between soft and medium.  - Pregnancy weight gain has been -0.454 kg () to date, which is adequate technically by her BMI.    - Discussed labor signs.    - Return to clinic on Friday.     Julio Cesar was seen today for abnormal labs and prenatal care.    Diagnoses and all orders for this visit:    Other iron deficiency anemia    Supervision of high-risk pregnancy of elderly multigravida    Decreased fetal movements in third trimester, single or unspecified fetus  Normal NST.    NST IS:  Reactive (2 accl > 15 BPM in 20 min., each lasting approx. 15 seconds)  NST Baseline Rate 120-moderateVariability:    Accelerations:Present  Variable Decelerations:No  Other Decelerations:No  Acoustical Stimulation Utilized:NO    Unable to print the NST - machine malfunctioning. 2 CMA's and myself attempted. Unhooked pt after 35 min and let her go. Tracing c/w above description that whole time, observed by me directly for first 19 min and then for last 10 min again.         Dysuria  -     Urinalysis, Micro If (UA) ([facility])    known GBS carrier and no fetal movement.  Patient Instructions   1. If baby is not moving, please STOP, drink a full glass of water, and do kick counts. If you get to 10 in one hour, the baby is fine.   IF BABY DOES NOT MOVE 10 TIMES - please come directly to the hospital, labor and delivery     Because you are pregnant, we have additional resources for you:  - You may call and ask to speak with one of our clinic nurses at [phone] during normal business hours, for non-urgent  questions about your pregnancy.  - Immediate OB help is also available 24 hours a day, seven days a week via the [facility] - [phone]  located at [address] [place] [address]    2. Follow up with me on Friday 12/21/18.    Options for treatment and follow-up care were reviewed with the patient and/or guardian. Julio Cesar Neil Cha and/or guardian engaged in the decision making process and verbalized understanding of the options discussed and agreed with the final plan.    The information in this document, created by the medical scribe for me, accurately reflects the services I personally performed and the decisions made by me. I have reviewed and approved this document for accuracy prior to leaving the patient care area.    Patricia Novoa MD  12:10 PM, 12/17/18

## 2018-12-18 ENCOUNTER — HOSPITAL ENCOUNTER (INPATIENT)
Facility: CLINIC | Age: 38
LOS: 2 days | Discharge: HOME | End: 2018-12-20
Attending: ADVANCED PRACTICE MIDWIFE | Admitting: ADVANCED PRACTICE MIDWIFE
Payer: COMMERCIAL

## 2018-12-18 LAB
ABO + RH BLD: NORMAL
ABO + RH BLD: NORMAL
BASOPHILS # BLD AUTO: 0 10E9/L (ref 0–0.2)
BASOPHILS NFR BLD AUTO: 0.1 %
BLD GP AB SCN SERPL QL: NORMAL
BLOOD BANK CMNT PATIENT-IMP: NORMAL
DIFFERENTIAL METHOD BLD: ABNORMAL
EOSINOPHIL # BLD AUTO: 0 10E9/L (ref 0–0.7)
EOSINOPHIL NFR BLD AUTO: 0.2 %
ERYTHROCYTE [DISTWIDTH] IN BLOOD BY AUTOMATED COUNT: 20.4 % (ref 10–15)
HCT VFR BLD AUTO: 35.2 % (ref 35–47)
HGB BLD-MCNC: 11.4 G/DL (ref 11.7–15.7)
IMM GRANULOCYTES # BLD: 0.1 10E9/L (ref 0–0.4)
IMM GRANULOCYTES NFR BLD: 0.9 %
LYMPHOCYTES # BLD AUTO: 1.3 10E9/L (ref 0.8–5.3)
LYMPHOCYTES NFR BLD AUTO: 10.8 %
MCH RBC QN AUTO: 26.9 PG (ref 26.5–33)
MCHC RBC AUTO-ENTMCNC: 32.4 G/DL (ref 31.5–36.5)
MCV RBC AUTO: 83 FL (ref 78–100)
MONOCYTES # BLD AUTO: 1 10E9/L (ref 0–1.3)
MONOCYTES NFR BLD AUTO: 7.9 %
NEUTROPHILS # BLD AUTO: 9.8 10E9/L (ref 1.6–8.3)
NEUTROPHILS NFR BLD AUTO: 80.1 %
NRBC # BLD AUTO: 0 10*3/UL
NRBC BLD AUTO-RTO: 0 /100
PLATELET # BLD AUTO: 227 10E9/L (ref 150–450)
RBC # BLD AUTO: 4.24 10E12/L (ref 3.8–5.2)
SPECIMEN EXP DATE BLD: NORMAL
WBC # BLD AUTO: 12.2 10E9/L (ref 4–11)

## 2018-12-18 PROCEDURE — 25000125 ZZHC RX 250

## 2018-12-18 PROCEDURE — G0463 HOSPITAL OUTPT CLINIC VISIT: HCPCS

## 2018-12-18 PROCEDURE — 25000132 ZZH RX MED GY IP 250 OP 250 PS 637: Performed by: ADVANCED PRACTICE MIDWIFE

## 2018-12-18 PROCEDURE — 86850 RBC ANTIBODY SCREEN: CPT | Performed by: ADVANCED PRACTICE MIDWIFE

## 2018-12-18 PROCEDURE — 86900 BLOOD TYPING SEROLOGIC ABO: CPT | Performed by: ADVANCED PRACTICE MIDWIFE

## 2018-12-18 PROCEDURE — 85025 COMPLETE CBC W/AUTO DIFF WBC: CPT | Performed by: ADVANCED PRACTICE MIDWIFE

## 2018-12-18 PROCEDURE — 86780 TREPONEMA PALLIDUM: CPT | Performed by: ADVANCED PRACTICE MIDWIFE

## 2018-12-18 PROCEDURE — 25000128 H RX IP 250 OP 636

## 2018-12-18 PROCEDURE — 10907ZC DRAINAGE OF AMNIOTIC FLUID, THERAPEUTIC FROM PRODUCTS OF CONCEPTION, VIA NATURAL OR ARTIFICIAL OPENING: ICD-10-PCS | Performed by: ADVANCED PRACTICE MIDWIFE

## 2018-12-18 PROCEDURE — 72200001 ZZH LABOR CARE VAGINAL DELIVERY SINGLE

## 2018-12-18 PROCEDURE — 86901 BLOOD TYPING SEROLOGIC RH(D): CPT | Performed by: ADVANCED PRACTICE MIDWIFE

## 2018-12-18 PROCEDURE — 12000030 ZZH R&B OB INTERMEDIATE UMMC

## 2018-12-18 PROCEDURE — 25000128 H RX IP 250 OP 636: Performed by: ADVANCED PRACTICE MIDWIFE

## 2018-12-18 RX ORDER — OXYTOCIN 10 [USP'U]/ML
10 INJECTION, SOLUTION INTRAMUSCULAR; INTRAVENOUS
Status: DISCONTINUED | OUTPATIENT
Start: 2018-12-18 | End: 2018-12-18

## 2018-12-18 RX ORDER — OXYTOCIN/0.9 % SODIUM CHLORIDE 30/500 ML
100-340 PLASTIC BAG, INJECTION (ML) INTRAVENOUS CONTINUOUS PRN
Status: DISCONTINUED | OUTPATIENT
Start: 2018-12-18 | End: 2018-12-18

## 2018-12-18 RX ORDER — NALOXONE HYDROCHLORIDE 0.4 MG/ML
.1-.4 INJECTION, SOLUTION INTRAMUSCULAR; INTRAVENOUS; SUBCUTANEOUS
Status: DISCONTINUED | OUTPATIENT
Start: 2018-12-18 | End: 2018-12-18

## 2018-12-18 RX ORDER — METHYLERGONOVINE MALEATE 0.2 MG/ML
200 INJECTION INTRAVENOUS
Status: DISCONTINUED | OUTPATIENT
Start: 2018-12-18 | End: 2018-12-18

## 2018-12-18 RX ORDER — OXYTOCIN/0.9 % SODIUM CHLORIDE 30/500 ML
PLASTIC BAG, INJECTION (ML) INTRAVENOUS
Status: COMPLETED
Start: 2018-12-18 | End: 2018-12-18

## 2018-12-18 RX ORDER — OXYTOCIN 10 [USP'U]/ML
10 INJECTION, SOLUTION INTRAMUSCULAR; INTRAVENOUS
Status: DISCONTINUED | OUTPATIENT
Start: 2018-12-18 | End: 2018-12-20 | Stop reason: HOSPADM

## 2018-12-18 RX ORDER — ACETAMINOPHEN 325 MG/1
650 TABLET ORAL EVERY 4 HOURS PRN
Status: DISCONTINUED | OUTPATIENT
Start: 2018-12-18 | End: 2018-12-20 | Stop reason: HOSPADM

## 2018-12-18 RX ORDER — ACETAMINOPHEN 325 MG/1
650 TABLET ORAL EVERY 4 HOURS PRN
Status: DISCONTINUED | OUTPATIENT
Start: 2018-12-18 | End: 2018-12-18

## 2018-12-18 RX ORDER — LIDOCAINE HYDROCHLORIDE 10 MG/ML
INJECTION, SOLUTION INFILTRATION; PERINEURAL
Status: DISCONTINUED
Start: 2018-12-18 | End: 2018-12-18 | Stop reason: WASHOUT

## 2018-12-18 RX ORDER — IBUPROFEN 800 MG/1
800 TABLET, FILM COATED ORAL
Status: DISCONTINUED | OUTPATIENT
Start: 2018-12-18 | End: 2018-12-18

## 2018-12-18 RX ORDER — FENTANYL CITRATE 50 UG/ML
50-100 INJECTION, SOLUTION INTRAMUSCULAR; INTRAVENOUS
Status: DISCONTINUED | OUTPATIENT
Start: 2018-12-18 | End: 2018-12-18

## 2018-12-18 RX ORDER — AMOXICILLIN 250 MG
2 CAPSULE ORAL 2 TIMES DAILY
Status: DISCONTINUED | OUTPATIENT
Start: 2018-12-18 | End: 2018-12-20 | Stop reason: HOSPADM

## 2018-12-18 RX ORDER — CARBOPROST TROMETHAMINE 250 UG/ML
250 INJECTION, SOLUTION INTRAMUSCULAR
Status: DISCONTINUED | OUTPATIENT
Start: 2018-12-18 | End: 2018-12-18

## 2018-12-18 RX ORDER — HYDROCORTISONE 2.5 %
CREAM (GRAM) TOPICAL 3 TIMES DAILY PRN
Status: DISCONTINUED | OUTPATIENT
Start: 2018-12-18 | End: 2018-12-20 | Stop reason: HOSPADM

## 2018-12-18 RX ORDER — OXYTOCIN/0.9 % SODIUM CHLORIDE 30/500 ML
340 PLASTIC BAG, INJECTION (ML) INTRAVENOUS CONTINUOUS PRN
Status: DISCONTINUED | OUTPATIENT
Start: 2018-12-18 | End: 2018-12-20 | Stop reason: HOSPADM

## 2018-12-18 RX ORDER — SODIUM CHLORIDE, SODIUM LACTATE, POTASSIUM CHLORIDE, CALCIUM CHLORIDE 600; 310; 30; 20 MG/100ML; MG/100ML; MG/100ML; MG/100ML
INJECTION, SOLUTION INTRAVENOUS CONTINUOUS
Status: DISCONTINUED | OUTPATIENT
Start: 2018-12-18 | End: 2018-12-18

## 2018-12-18 RX ORDER — OXYTOCIN 10 [USP'U]/ML
INJECTION, SOLUTION INTRAMUSCULAR; INTRAVENOUS
Status: DISCONTINUED
Start: 2018-12-18 | End: 2018-12-18 | Stop reason: WASHOUT

## 2018-12-18 RX ORDER — IBUPROFEN 800 MG/1
800 TABLET, FILM COATED ORAL EVERY 6 HOURS PRN
Status: DISCONTINUED | OUTPATIENT
Start: 2018-12-18 | End: 2018-12-20 | Stop reason: HOSPADM

## 2018-12-18 RX ORDER — LIDOCAINE 40 MG/G
CREAM TOPICAL
Status: DISCONTINUED | OUTPATIENT
Start: 2018-12-18 | End: 2018-12-18

## 2018-12-18 RX ORDER — SODIUM CHLORIDE, SODIUM LACTATE, POTASSIUM CHLORIDE, CALCIUM CHLORIDE 600; 310; 30; 20 MG/100ML; MG/100ML; MG/100ML; MG/100ML
INJECTION, SOLUTION INTRAVENOUS
Status: COMPLETED
Start: 2018-12-18 | End: 2018-12-18

## 2018-12-18 RX ORDER — AMOXICILLIN 250 MG
1 CAPSULE ORAL 2 TIMES DAILY
Status: DISCONTINUED | OUTPATIENT
Start: 2018-12-18 | End: 2018-12-20 | Stop reason: HOSPADM

## 2018-12-18 RX ORDER — OXYCODONE HYDROCHLORIDE 5 MG/1
5 TABLET ORAL EVERY 4 HOURS PRN
Status: DISCONTINUED | OUTPATIENT
Start: 2018-12-18 | End: 2018-12-20 | Stop reason: HOSPADM

## 2018-12-18 RX ORDER — MISOPROSTOL 200 UG/1
TABLET ORAL
Status: DISCONTINUED
Start: 2018-12-18 | End: 2018-12-18 | Stop reason: WASHOUT

## 2018-12-18 RX ORDER — OXYCODONE AND ACETAMINOPHEN 5; 325 MG/1; MG/1
1 TABLET ORAL
Status: DISCONTINUED | OUTPATIENT
Start: 2018-12-18 | End: 2018-12-18

## 2018-12-18 RX ORDER — PENICILLIN G POTASSIUM 5000000 [IU]/1
5 INJECTION, POWDER, FOR SOLUTION INTRAMUSCULAR; INTRAVENOUS ONCE
Status: DISCONTINUED | OUTPATIENT
Start: 2018-12-18 | End: 2018-12-18

## 2018-12-18 RX ORDER — OXYTOCIN/0.9 % SODIUM CHLORIDE 30/500 ML
100 PLASTIC BAG, INJECTION (ML) INTRAVENOUS CONTINUOUS
Status: DISCONTINUED | OUTPATIENT
Start: 2018-12-18 | End: 2018-12-20 | Stop reason: HOSPADM

## 2018-12-18 RX ORDER — LANOLIN 100 %
OINTMENT (GRAM) TOPICAL
Status: DISCONTINUED | OUTPATIENT
Start: 2018-12-18 | End: 2018-12-20 | Stop reason: HOSPADM

## 2018-12-18 RX ORDER — ONDANSETRON 2 MG/ML
4 INJECTION INTRAMUSCULAR; INTRAVENOUS EVERY 6 HOURS PRN
Status: DISCONTINUED | OUTPATIENT
Start: 2018-12-18 | End: 2018-12-18

## 2018-12-18 RX ORDER — NALOXONE HYDROCHLORIDE 0.4 MG/ML
.1-.4 INJECTION, SOLUTION INTRAMUSCULAR; INTRAVENOUS; SUBCUTANEOUS
Status: DISCONTINUED | OUTPATIENT
Start: 2018-12-18 | End: 2018-12-20 | Stop reason: HOSPADM

## 2018-12-18 RX ADMIN — SODIUM CHLORIDE, POTASSIUM CHLORIDE, SODIUM LACTATE AND CALCIUM CHLORIDE: 600; 310; 30; 20 INJECTION, SOLUTION INTRAVENOUS at 20:01

## 2018-12-18 RX ADMIN — IBUPROFEN 800 MG: 800 TABLET ORAL at 22:25

## 2018-12-18 RX ADMIN — OXYTOCIN-SODIUM CHLORIDE 0.9% IV SOLN 30 UNIT/500ML 340 ML/HR: 30-0.9/5 SOLUTION at 20:44

## 2018-12-18 RX ADMIN — Medication 340 ML/HR: at 20:44

## 2018-12-18 ASSESSMENT — ACTIVITIES OF DAILY LIVING (ADL)
FALL_HISTORY_WITHIN_LAST_SIX_MONTHS: NO
RETIRED_EATING: 0-->INDEPENDENT
RETIRED_COMMUNICATION: 0-->UNDERSTANDS/COMMUNICATES WITHOUT DIFFICULTY
COGNITION: 0 - NO COGNITION ISSUES REPORTED
SWALLOWING: 0-->SWALLOWS FOODS/LIQUIDS WITHOUT DIFFICULTY
TOILETING: 0-->INDEPENDENT
BATHING: 0-->INDEPENDENT
TRANSFERRING: 0-->INDEPENDENT
DRESS: 0-->INDEPENDENT
AMBULATION: 0-->INDEPENDENT

## 2018-12-19 ENCOUNTER — OFFICE VISIT (OUTPATIENT)
Dept: INTERPRETER SERVICES | Facility: CLINIC | Age: 38
End: 2018-12-19

## 2018-12-19 ENCOUNTER — TELEPHONE (OUTPATIENT)
Dept: FAMILY MEDICINE | Facility: CLINIC | Age: 38
End: 2018-12-19

## 2018-12-19 LAB
HGB BLD-MCNC: 10.5 G/DL (ref 11.7–15.7)
T PALLIDUM AB SER QL: NONREACTIVE

## 2018-12-19 PROCEDURE — 12000028 ZZH R&B OB UMMC

## 2018-12-19 PROCEDURE — 25000132 ZZH RX MED GY IP 250 OP 250 PS 637: Performed by: FAMILY MEDICINE

## 2018-12-19 PROCEDURE — 36415 COLL VENOUS BLD VENIPUNCTURE: CPT | Performed by: ADVANCED PRACTICE MIDWIFE

## 2018-12-19 PROCEDURE — T1013 SIGN LANG/ORAL INTERPRETER: HCPCS | Mod: U3

## 2018-12-19 PROCEDURE — 25000132 ZZH RX MED GY IP 250 OP 250 PS 637: Performed by: ADVANCED PRACTICE MIDWIFE

## 2018-12-19 PROCEDURE — 85018 HEMOGLOBIN: CPT | Performed by: ADVANCED PRACTICE MIDWIFE

## 2018-12-19 RX ORDER — IBUPROFEN 600 MG/1
600 TABLET, FILM COATED ORAL EVERY 6 HOURS PRN
Qty: 60 TABLET | Refills: 0 | Status: SHIPPED | OUTPATIENT
Start: 2018-12-19 | End: 2019-07-09

## 2018-12-19 RX ORDER — SENNOSIDES 8.6 MG
1 TABLET ORAL 2 TIMES DAILY PRN
Qty: 60 TABLET | Refills: 1 | Status: SHIPPED | OUTPATIENT
Start: 2018-12-19 | End: 2019-07-09

## 2018-12-19 RX ADMIN — OXYCODONE HYDROCHLORIDE 5 MG: 5 TABLET ORAL at 03:32

## 2018-12-19 RX ADMIN — SALINE NASAL SPRAY 1 SPRAY: 1.5 SOLUTION NASAL at 11:18

## 2018-12-19 RX ADMIN — IBUPROFEN 800 MG: 800 TABLET ORAL at 04:25

## 2018-12-19 RX ADMIN — IBUPROFEN 800 MG: 800 TABLET ORAL at 11:00

## 2018-12-19 RX ADMIN — SENNOSIDES AND DOCUSATE SODIUM 1 TABLET: 8.6; 5 TABLET ORAL at 07:38

## 2018-12-19 RX ADMIN — ACETAMINOPHEN 650 MG: 325 TABLET, FILM COATED ORAL at 03:32

## 2018-12-19 RX ADMIN — IBUPROFEN 800 MG: 800 TABLET ORAL at 17:18

## 2018-12-19 RX ADMIN — ACETAMINOPHEN 650 MG: 325 TABLET, FILM COATED ORAL at 07:38

## 2018-12-19 NOTE — PROGRESS NOTES
Data: Patient presented to [facility] at 1936.   Reason for maternal/fetal assessment per patient is Laboring  .  Patient is a . Prenatal record reviewed.      Obstetric History       T7      L7     SAB1   TAB0   Ectopic0   Multiple0   Live Births7       # Outcome Date GA Lbr Antoni/2nd Weight Sex Delivery Anes PTL Lv   8 Term 18 40w3d 04:14 / 00:23 3.57 kg (7 lb 13.9 oz) M Vag-Spont None N JOSE ALBERTO      Name: KATERINA LAWLER-SHAMNIGEL      Complications: GBS      Apgar1:  9                Apgar5: 9   7 Term 02/28/15 41w0d 02:15 / 00:22 3.884 kg (8 lb 9 oz) M Vag-Spont None  JOSE ALBERTO      Apgar1:  7                Apgar5: 9   6 SAB 13           5 Term 12 39w5d 03:30 / 00:29 3.21 kg (7 lb 1.2 oz) F  None N JOSE ALBERTO      Name: MOIRA LAWLER SHAMNIGEL      Apgar1:  8                Apgar5: 9   4 Term 08 41w0d  3.175 kg (7 lb) F Vag-Spont None  JOSE ALBERTO   3 Term 05 40w0d  3.884 kg (8 lb 9 oz) M Vag-Vacuum Local  JOSE ALBERTO   2 Term 02 38w0d  2.722 kg (6 lb) M Vag-Spont None  JOSE ALBERTO   1 Term 01 40w0d  3.345 kg (7 lb 6 oz) M Vag-Vacuum EPI  JOSE ALBERTO      . Medical history:   Past Medical History:   Diagnosis Date     Anemia 2012   . Gestational Age 40w3d. VSS. Fetal movement present. Patient denies backache, vaginal discharge, pelvic pressure, UTI symptoms, GI problems, vaginal bleeding, edema, headache, visual disturbances, epigastric or URQ pain, rupture of membranes. C/o contractions that began at 1600. Reports bloody show. Support person present.  Action: Verbal consent for EFM. Triage assessment completed. EFM applied for fetal well-being. Uterine assessment done via TOCO. Fetal assessment: Presumed adequate fetal oxygenation documented (see flow record). SVE 6/100/0 with bulging bag.  Response: Tevin Pierre CNM informed of pt arrival and SVE. Plan per provider is admit pt for labor. Patient verbalized agreement with plan. Patient remains in room 444 ambulatory, oriented to room and call  light.

## 2018-12-19 NOTE — PLAN OF CARE
Data: Julio Cesar Cha transferred to 69 via wheelchair at 2300. Baby transferred via parent's arms.  Action: Receiving unit notified of transfer: Yes. Patient and family notified of room change. Report given to MARISELA Sánchez at 2315. Belongings sent to receiving unit. Accompanied by Registered Nurse. Oriented patient to surroundings. Call light within reach. ID bands double-checked with receiving RN.  Response: Patient tolerated transfer and is stable.

## 2018-12-19 NOTE — PROGRESS NOTES
[facility] Postpartum Progress Note  2018 9:12 AM  Date of service: 2018.         Interval History:   Julio Cesar Cha is PPD#1 s/p precipitous  last night, she presented to L&D complete and quickly progressed to delivery.    Pain controlled? yes  Ambulating? yes  Regular diet? yes  Voiding? yes  Lochia? moderate  Breastfeeding? Yes, going well, has  before  Contraception? Planning Depo at postpartum visit    Mild nasal congestion - asking for something to help with that, no other s/sx of URI         Physical Exam:     Vitals:    18 2244 18 0004 18 0400 18 0741   BP: 107/50 110/59 95/53 104/60   Resp:    Temp:  99  F (37.2  C) 99.1  F (37.3  C) 98.4  F (36.9  C)   TempSrc:  Oral Oral Oral       GENERAL:         healthy, alert and no distress  RESPIRATORY:   No increased work of breathing, good air exchange, clear to auscultation bilaterally, no crackles or wheezing   CARDIOVASCULAR:   Regular rate and rhythm, normal S1 and S2, no S3 or S4, and no murmur noted   ABDOMEN:   Normal bowel sounds, soft, non-distended, non-tender  Fundal height at level of umbilicus and slightly to the R.  Firm and mildly tender.   EXTREMITIES:          No edema, non-tender         Data:     Hemoglobin   Date Value Ref Range Status   2018 10.5 (L) 11.7 - 15.7 g/dL Final   2018 11.4 (L) 11.7 - 15.7 g/dL Final     Lab Results   Component Value Date    RH Pos 2018    RUBELLAABIGG 194 2011            Assessment and Plan:    Julio Cesar Cha is a 38 year old  PPD#1 s/p ..  L&D complications: None     Patient Active Problem List   Diagnosis     Supervision of high-risk pregnancy of elderly multigravida     Hoarseness     Vitamin D deficiency     Iron deficiency anemia     Poor weight gain of pregnancy, third trimester     Positive GBS test      (normal  spontaneous vaginal delivery)       Pain: controlled with NSAIDs  Anemia?Yes: but mild, Hgb 10.6 (had received IV iron during pregnancy)  Diet: Normal   Encourage ambulation  Baby feeding by breast  Contraception: depoprovera at follow up visit  Influenza vaccine: Yes   Rh pos: Rhogam nog indicated  Rubella Immune: MMR not indicated  Tdap (for pertussis): yes  Counseled about:  cares, pain mgmt, mood  Dispo: Routine post-partum cares, anticipate DC home PPD# 2.    Nasal saline ordered for bedside use.    # Pain Assessment:  Current Pain Score 2018   Patient currently in pain? yes   Pain score (0-10) -   Pain location Uterine   Pain descriptors Cramping   - Shamis is experiencing pain due to postpartum status. Pain management was discussed and the plan was created in a collaborative fashion.  Julio Cesar's response to the current recommendations: engaged  - Pharmacologic adjuvants: NSAIDs  - Non-pharmacologic adjuvants: Heat          Marizol Ferrari MD  [facility]

## 2018-12-19 NOTE — PROVIDER NOTIFICATION
12/19/18 0818   Provider Notification   Provider Name/Title Dr Ferrari   Method of Notification Electronic Page   Notification Reason Medication Request   Pt requesting order for  nasal spray.

## 2018-12-19 NOTE — TELEPHONE ENCOUNTER
Please call patient to schedule 2 week and 6 week postpartum visits:    Date of Delivery: 12/18/18  Anticipated Date of Discharge: 12/20/18    Please document all calls. Close encounter after appointment is scheduled or after last attempt has been made    Talya Hatfield RN

## 2018-12-19 NOTE — PLAN OF CARE
VSS. Postpartum checks WDL. Up independently, voiding without difficulty. Uterine cramps partially controlled with ibuprofen, tylenol, oxycodone, and heat packs. Using abdominal binder for support. Breastfeeding independently, infant sleepy and spitty so disinterested. Hand-expressing colostrum into spoon and infant's mouth. Will continue to monitor.

## 2018-12-19 NOTE — PLAN OF CARE
Patient arrived to [facility] unit via wheelchair at 2300,with belongings, accompanied by spouse/ significant other, with infant in arms. Received report from MARISELA Villa and checked bands. Unit and room orientation started. Call light within arms reach; no concerns present at this time. Continue with plan of care.

## 2018-12-19 NOTE — PLAN OF CARE
of viable Male with Tevin Pierre in attendance. Nursery RN present.  Infant with spontaneous cry, to mothers abdomen, dried and stimulated.  Apgars 9+9 .  Placenta delivered without complication, Pitocin bolused, skidmark laceration without repair, abilio cares provided.  Mother and baby in stable condition.

## 2018-12-19 NOTE — L&D DELIVERY NOTE
Delivery Summary  DELIVERY NOTE:  Brief Labor Course: lissetts pt arrived complete with bulging BOW. AROM with pt consent and pushed well without pain medication. Pos GBS not treated.  Delivery Note:    viable male with tight CAN, somersault out. spont cry and placed on moms abd. IV with pitocin opened after delivery of baby and spont last placenta delivered intact. Perineum intact  IUP at 40 weeks gestation delivered on 2018.     delivery of a viable Male infant.  Weight : 7 pounds 14 ounces   Apgars of 9 at 1 minute and 9 at 5 minutes.  Labor was spontaneous.  Medications administered  in labor:  Pain Rx None; Antibiotics No; Other   Perineum: Intact  Placenta-mechanism: spontaneous, intact,  with a 3 vessel cord. IV oxytocin was given After delivery of baby  Quantitative Blood Loss was pending. EBL 200cc.  Complications of labor and delivery: AMA, GBS not  treated and Nuchal cord  Anticipated Discharge Date: 18  Birth attendants: MICHELLE Allen CNM, CNM Neil Tavarezim MRN# [id number]   Age: 38 year old YOB: 1980     ASSESSMENT & PLAN:        Labor Event Times    Labor onset date:  18 Onset time:   4:00 PM   Dilation complete date:  18 Complete time:   8:14 PM   Start pushing date/time:  2018      Labor Length    1st Stage (hrs):  4 (min):  14   2nd Stage (hrs):  0 (min):  23   3rd Stage (hrs):  0 (min):  6      Labor Events     labor?:  No   steroids:  None  Labor Type:  Spontaneous  Predominate monitoring during 1st stage:  continuous electronic fetal monitoring     Antibiotics received during labor?:  No     Rupture identifier:  Rupture 1  Rupture date/time: 18   Rupture type:  Artificial Rupture of Membranes  Fluid color:  Clear  Fluid odor:  Normal     1:1 continuous labor support provided by?:  RN Labor partogram used?:  no      Delivery/Placenta Date and Time   "  Delivery Date:  18 Delivery Time:   8:37 PM   Placenta Date/Time:  2018  8:43 PM  Oxytocin given at the time of delivery:  after delivery of baby     Vaginal Counts     Initial count performed by 2 team members:   Two Team Members   faith hussein, MARISELA       Troy Suture [employer] Spong[employer] Instruments   Initial counts 2 0 5    Added to count 0 0 0    Final counts 2 0 5    Placed during labor Accounted for at the end of labor   No NA   No NA   No NA    Final count performed by 2 team members:   Two Team Members   faith hussein, MARISELA      Final count correct?:  Yes     Apgars    Living status:  Living   1 Minute 5 Minute 10 Minute 15 Minute 20 Minute   Skin color: 1  1       Heart rate: 2  2       Reflex irritability: 2  2       Muscle tone: 2  2       Respiratory effort: 2  2       Total: 9  9       Apgars assigned by:  ADDY     Cord    Vessels:  3 Vessels Complications:  Nuchal   Cord Blood Disposition:  Lab Gases Sent?:  No       Resuscitation    Methods:  None     [place] Measurements    Weight:  7 lb 13.9 oz Length:  1' 8.5\"   Head circumference:  35.6 cm       Skin to Skin and Feeding Plan    Skin to skin initiation date/time: 1841    Skin to skin with:  Mother  Skin to skin end date/time:     Breastfeeding initiated date/time:  2018  How do you plan to feed your baby:  Breastfeeding     Labor Events and Shoulder Dystocia    Fetal Tracing Prior to Delivery:  Category 2  Shoulder dystocia present?:  Neg     Delivery (Maternal) (Provider to Complete) ([id number])    Episiotomy:  None  Perineal lacerations:  None    Vaginal laceration?:  No    Cervical laceration?:  No       Blood Loss  Mother: Julio Cesar Cha [facility] #[id number]   Start of Mother's Information    IO Blood Loss  18 1600 - 18 2147    Total QBL Blood Loss (mL) Hospital Encounter 187 mL    Total  187 mL         End of Mother's Information  Mother: Julio Cesar Cha " [facility] #[id number]         Delivery - Provider to Complete ([id number])    Delivering clinician:  Radha Pierre APRN CNM  CNIRASEMA Care:  Exclusive CNM care in labor  Attempted Delivery Types (Choose all that apply):  Spontaneous Vaginal Delivery  Delivery Type (Choose the 1 that will go to the Birth History):  Vaginal, Spontaneous          Placenta    Delayed Cord Clamping:  Done  Date/Time:  12/18/2018  8:43 PM  Removal:  Spontaneous  Disposition:  Hospital disposal     Anesthesia    Method:  None          Presentation and Position    Presentation:  Vertex   Occiput Anterior           MICHELLE Ny CNM

## 2018-12-19 NOTE — H&P
ADMIT NOTE  =================  40w3d    Julio Cesar Cha is a 38 year old female with an Patient's last menstrual period was 2018 (exact date). and Estimated Date of Delivery: Dec 15, 2018 [facility] on 2018 at 9:27 PM with in active labor.     HPI  ================  Declined .  Shannon Pt arrived with  in active labor. contr became strong around 1600pm tonight. Intact BOW. Small bloody show. No urge to push. Was 6cm with bulging BOW on admission per RN, shortly thereafter was complete with a bulging BOW. Pos GBS - not treated  AROM clear fluid with pt consent. Pushed well. See delivery summary.    Contractions- moderate and strong  Fetal movement- active  ROM- no   Vaginal bleeding- small  GBS- positive  FOB- is involved,  here  Other labor support- none    Weight gain- 164-163  lbs, Total weight gain- -1 lbs  Height- na  BMI- 29  First prenatal visit at 18 weeks, Total visits- 9    PROBLEM LIST  =================  Patient Active Problem List    Diagnosis Date Noted     Labor and delivery, indication for care 2018     Priority: Medium     Poor weight gain of pregnancy, third trimester 2018     Priority: Medium     Positive GBS test 2018     Priority: Medium     Vitamin D deficiency 2015     Priority: Medium     Iron deficiency anemia 2015     Priority: Medium     Hoarseness 2015     Priority: Medium     Supervision of high-risk pregnancy of elderly multigravida 2014     Priority: Medium     HIGH RISK  with ELZA  Dec 15, 2018  MATERNAL HISTORY/DIAGNOSES   1. AMA  2. Grandmultiparity  3. Fe def got 3 IV Fe infusions  4. Poor wt gain  Blood Type: O pos  OB PROVIDERS   1. Ying Mahan  2. Rebecca Lott, pager [phone]  PRENATAL CARE PLAN     Consults:     Ultrasounds:   DELIVERY PLAN     Planned mode/timing of delivery:     Notifications during labor: Page OB resident providers above    Post Partum  Contraception: Depo  GBS: POSITIVE 18           HISTORIES  ============  Allergies   Allergen Reactions     Omeprazole Diarrhea     Past Medical History:   Diagnosis Date     Anemia 2012     Past Surgical History:   Procedure Laterality Date     ORTHOPEDIC SURGERY     .  Family History   Problem Relation Age of Onset     Family History Negative Mother      Family History Negative Father      Family History Negative Sister      Diabetes Brother      Social History     Tobacco Use     Smoking status: Never Smoker     Smokeless tobacco: Never Used   Substance Use Topics     Alcohol use: No     Obstetric History       T6      L6     SAB1   TAB0   Ectopic0   Multiple0   Live Births6       # Outcome Date GA Lbr Antoni/2nd Weight Sex Delivery Anes PTL Lv   8 Current            7 Term 02/28/15 41w0d 02:15 / 00:22 3.884 kg (8 lb 9 oz) M Vag-Spont None  JOSE ALBERTO      Apgar1:  7                Apgar5: 9   6 SAB 13           5 Term 12 39w5d 03:30 / 00:29 3.21 kg (7 lb 1.2 oz) F  None N JOSE ALBERTO      Name: MOIRA LAWLER      Apgar1:  8                Apgar5: 9   4 Term 08 41w0d  3.175 kg (7 lb) F Vag-Spont None  JOSE ALBERTO   3 Term 05 40w0d  3.884 kg (8 lb 9 oz) M Vag-Vacuum Local  JOSE ALBERTO   2 Term 02 38w0d  2.722 kg (6 lb) M Vag-Spont None  JOSE ALBERTO   1 Term 01 40w0d  3.345 kg (7 lb 6 oz) M Vag-Vacuum EPI  JOSE ALBERTO           LABS:   ===========  Prenatal Labs:  Rhogam not indicated . Opos  Lab Results   Component Value Date    ABO PENDING 2018    RH Pos 2018    AS PENDING 2018    HEPBANG Nonreactive 2018    TREPAB Negative 2014    RUQIGG 46 2018    HGB 11.4 (L) 2018    HIV Negative 2011     Rubella immune  Lab Results   Component Value Date    GBS Positive 2018     Other labs:  Results for orders placed or performed during the hospital encounter of 18 (from the past 24 hour(s))   CBC with platelets differential   Result Value Ref  Range    WBC 12.2 (H) 4.0 - 11.0 10e9/L    RBC Count 4.24 3.8 - 5.2 10e12/L    Hemoglobin 11.4 (L) 11.7 - 15.7 g/dL    Hematocrit 35.2 35.0 - 47.0 %    MCV 83 78 - 100 fl    MCH 26.9 26.5 - 33.0 pg    MCHC 32.4 31.5 - 36.5 g/dL    RDW 20.4 (H) 10.0 - 15.0 %    Platelet Count 227 150 - 450 10e9/L    Diff Method Automated Method     % Neutrophils 80.1 %    % Lymphocytes 10.8 %    % Monocytes 7.9 %    % Eosinophils 0.2 %    % Basophils 0.1 %    % Immature Granulocytes 0.9 %    Nucleated RBCs 0 0 /100    Absolute Neutrophil 9.8 (H) 1.6 - 8.3 10e9/L    Absolute Lymphocytes 1.3 0.8 - 5.3 10e9/L    Absolute Monocytes 1.0 0.0 - 1.3 10e9/L    Absolute Eosinophils 0.0 0.0 - 0.7 10e9/L    Absolute Basophils 0.0 0.0 - 0.2 10e9/L    Abs Immature Granulocytes 0.1 0 - 0.4 10e9/L    Absolute Nucleated RBC 0.0    ABO/Rh type and screen   Result Value Ref Range    ABO PENDING     Antibody Screen PENDING     Test Valid Only At          [facility]    Specimen Expires 12/21/2018        ROS  =========  Pt denies significant respiratory, cardiovacular, GI, or muscular/skeletalcomplaints.    See RN data base ROS.       PHYSICAL EXAM:  ===============  LMP 04/08/2018 (Exact Date)   General appearance: uncomfortable with contractions  GENERAL APPEARANCE: healthy, alert and no distress  RESP: lungs clear to auscultation - no rales, rhonchi or wheezes  CV: regular rates and rhythm, normal S1 S2, no S3 or S4 and no murmur,and no varicosities  ABDOMEN:  soft, nontender, no epigastric pain  SKIN: no suspicious lesions or rashes  NEURO: Denies headache, blurred vision, other vision changes  PSYCH: uncomfortable. Working with contr  Legs: No edema     Abdomen: gravid, vertex fetus per Leopold's, non-tender between contractions.   Cephalic presentation confirmed by BSUS  EFW-  8 lbs.   CONTACTIONS: moderate and every 2-3 minutes  FETAL HEART TONES: continuous EFM- baseline 140 with moderate variability  and intermittent variable decelerations. Not tracing completely due to mom's discomfort in labor  PELVIC EXAM:complete/ 0station  MCCANN SCORE: na  BLOODY SHOW: yes small   ROM:AROM clear fluid with pt consent  FLUID: clear  ROMPLUS: not done    # Pain Assessment:  Current Pain Score 12/18/2018   Patient currently in pain? yes   Pain score (0-10) -   Pain location Perineum   Pain descriptors Pressure   - Julio Cesar is experiencing pain due to labor. Pain management was discussed with Julio Cesar and her spouse and the plan was created in a collaborative fashion.  Julio Cesar's response to the current recommendations: engaged  - pt asking for epidural but willing to push and deliver quickly without pain meds when discussed    ASSESSMENT:  ==============  IUP @ 40w3d admitted second stage   NST NOT DONE  Fetal Heart Rate - category two  GBS- positive- not treated  AMA  Grand multip   PLAN:  ===========  Admit - see IP orders  See delivery summary   Radha Raygoza, MICHELLE COLLADOM

## 2018-12-20 VITALS — SYSTOLIC BLOOD PRESSURE: 111 MMHG | DIASTOLIC BLOOD PRESSURE: 70 MMHG | TEMPERATURE: 98.5 F | RESPIRATION RATE: 16 BRPM

## 2018-12-20 PROCEDURE — 25000132 ZZH RX MED GY IP 250 OP 250 PS 637: Performed by: ADVANCED PRACTICE MIDWIFE

## 2018-12-20 RX ADMIN — IBUPROFEN 800 MG: 800 TABLET ORAL at 03:04

## 2018-12-20 RX ADMIN — IBUPROFEN 800 MG: 800 TABLET ORAL at 10:35

## 2018-12-20 NOTE — PLAN OF CARE
Stable postpartum. Uterine cramping managed with medication. Independent with self and baby cares. Breastfeeding  without issue. Family bonding.

## 2018-12-20 NOTE — PLAN OF CARE
Postpartum discharge instructions and medications reviewed and pt verbalized understanding. Received breast pump. Independent with self and baby cares. Breastfeeding  without issue. Reviewed concerns and questions. Mother discharged and will stay in room as border baby will be discharged later this evening. Family here with mother.

## 2018-12-20 NOTE — DISCHARGE SUMMARY
[facility] Medicine  Post-partum Discharge Summary    Julio Cesar Cha MRN# [id number]   Age: 38 year old YOB: 1980     Date of Admission:  2018  Date of Discharge::  2018  Admitting Provider:  MICHELLE Brumfield CNIRASEMA  Discharge Physician:  Becca Kan MD     Home clinic: [facility]         Admission Diagnoses:   Maternity*ELZA 12/15/2018/Labor  Labor and delivery, indication for care   (normal spontaneous vaginal delivery)          Discharge Diagnosis:   Normal spontaneous vaginal delivery  Intrauterine pregnancy at 40w3d gestation  Patient Active Problem List   Diagnosis     Supervision of high-risk pregnancy of elderly multigravida     Hoarseness     Vitamin D deficiency     Iron deficiency anemia     Poor weight gain of pregnancy, third trimester     Positive GBS test      (normal spontaneous vaginal delivery)             Procedures:   Procedure(s): No additional procedures performed              Medications Prior to Admission:     Medications Prior to Admission   Medication Sig Dispense Refill Last Dose     acetaminophen (TYLENOL) 500 MG tablet Take 2 tablets (1,000 mg) by mouth every 6 hours as needed for mild pain 100 tablet 0 Past Month at Unknown time     ferrous sulfate (IRON) 325 (65 Fe) MG tablet Take 1 tablet (325 mg) by mouth daily (with breakfast) 90 tablet 3 2018 at Unknown time     Prenatal Vit-Fe Fumarate-FA (PRENATAL MULTIVITAMIN  WITH IRON) 28-0.8 MG TABS Take 1 tablet by mouth daily 90 each 2 2018 at Unknown time     VITAMIN D, CHOLECALCIFEROL, PO Take by mouth daily   2018 at Unknown time     [DISCONTINUED] hydrocortisone valerate (WEST-GABRIEL) 0.2 % ointment Apply sparingly to affected area three times daily as needed. 45 g 0 2018 at Unknown time             Discharge Medications:     Current Discharge Medication List      START taking these medications    Details    ibuprofen (ADVIL/MOTRIN) 600 MG tablet Take 1 tablet (600 mg) by mouth every 6 hours as needed for moderate pain  Qty: 60 tablet, Refills: 0    Associated Diagnoses:  (normal spontaneous vaginal delivery)      sennosides (SENOKOT) 8.6 MG tablet Take 1 tablet by mouth 2 times daily as needed for constipation  Qty: 60 tablet, Refills: 1    Associated Diagnoses:  (normal spontaneous vaginal delivery)         CONTINUE these medications which have NOT CHANGED    Details   acetaminophen (TYLENOL) 500 MG tablet Take 2 tablets (1,000 mg) by mouth every 6 hours as needed for mild pain  Qty: 100 tablet, Refills: 0    Associated Diagnoses: Pregnancy headache in first trimester      ferrous sulfate (IRON) 325 (65 Fe) MG tablet Take 1 tablet (325 mg) by mouth daily (with breakfast)  Qty: 90 tablet, Refills: 3    Associated Diagnoses: Iron deficiency anemia, unspecified iron deficiency anemia type      Prenatal Vit-Fe Fumarate-FA (PRENATAL MULTIVITAMIN  WITH IRON) 28-0.8 MG TABS Take 1 tablet by mouth daily  Qty: 90 each, Refills: 2    Associated Diagnoses: Pregnancy test positive      VITAMIN D, CHOLECALCIFEROL, PO Take by mouth daily         STOP taking these medications       hydrocortisone valerate (WEST-GABRIEL) 0.2 % ointment Comments:   Reason for Stopping:                     Consultations:   No consultations were requested during this admission          Brief History of Labor:   On 18 at 7:36 PM , this 38 year old year old  under no analgesia delivered a viable Male infant weighing 7lb 14 oz with APGAR scores below:  APGARs 1 Min 5Min 10Min   Totals:  9  9              Hospital Course (HPI):   The patient's hospital course was unremarkable.  On discharge, her pain was well controlled. Vaginal bleeding is decreased.  Voiding without difficulty.  Ambulating well and tolerating a normal diet.  No fever. Breast feeding going well.  Infant is stable.  She was discharged on post-partum day #2. Contraception  plan:depo at postpartum visit. Post partum depression education was provided.         D/C Physical Exam:     Vitals:    18 0741 18 1810 18 2331 18 0908   BP: 104/60 108/62 102/60 111/70   Resp: 18 16 16 16   Temp: 98.4  F (36.9  C) 98.7  F (37.1  C) 98.4  F (36.9  C) 98.5  F (36.9  C)   TempSrc: Oral Oral Oral Oral     GENERAL:         healthy, alert and no distress  RESPIRATORY:   No increased work of breathing, good air exchange, clear to auscultation bilaterally, no crackles or wheezing   CARDIOVASCULAR:   Normal apical impulse, regular rate and rhythm, normal S1 and S2, no S3 or S4, and no murmur noted   ABDOMEN:   No scars, normal bowel sounds, soft, non-distended, non-tender, no masses palpated, no hepatosplenomegally  Fundal height at level of umbilicus + 1 cm.  Firm and mildly tender.   EXTREMITIES:          no edema, non-tender     Post-partum hemoglobin:   Hemoglobin   Date Value Ref Range Status   2018 10.5 (L) 11.7 - 15.7 g/dL Final           Discharge Instructions and Follow-Up:   Discharge diet: Advance to a regular diet as tolerated   Discharge activity: Pelvic rest: abstain from intercourse and do not use tampons for 6 week(s)   Discharge follow-up: Follow up with primary care provider in 2 and 6 weeks   Wound care: Drink plenty of fluids  Ice to area for comfort     # Discharge Pain Plan:   - During her hospitalization, Julio Cesar experienced pain due to .  The pain plan for discharge was discussed with Julio Cesar and the plan was created in a collaborative fashion.    - Pharmacologic adjuvants:  NSAIDs and Acetaminophen           Discharge Disposition:   Discharged to home        Becca Kan MD

## 2018-12-20 NOTE — PLAN OF CARE
VSS. Postpartum checks WDL. Up independently, voiding without difficulty. Had BM yesterday. Uterine cramping managed with ibuprofen and heat packs. Breastfeeding infant independent with good latch observed. Will continue POC.

## 2019-01-02 NOTE — TELEPHONE ENCOUNTER
Patient presented in clinic for child's appointment and scheduled 2 week postpartum appointment with Dr Mahan for 1/17/19 at 2:20pm. Patient advised that she will call to schedule 6 week appointment.

## 2019-01-17 ENCOUNTER — OFFICE VISIT (OUTPATIENT)
Dept: FAMILY MEDICINE | Facility: CLINIC | Age: 39
End: 2019-01-17
Payer: COMMERCIAL

## 2019-01-17 VITALS
OXYGEN SATURATION: 99 % | BODY MASS INDEX: 25.53 KG/M2 | HEART RATE: 74 BPM | WEIGHT: 147.6 LBS | RESPIRATION RATE: 16 BRPM | DIASTOLIC BLOOD PRESSURE: 83 MMHG | SYSTOLIC BLOOD PRESSURE: 122 MMHG | TEMPERATURE: 97.7 F

## 2019-01-17 DIAGNOSIS — Z30.013 ENCOUNTER FOR INITIAL PRESCRIPTION OF INJECTABLE CONTRACEPTIVE: ICD-10-CM

## 2019-01-17 PROBLEM — B95.1 POSITIVE GBS TEST: Status: RESOLVED | Noted: 2018-11-30 | Resolved: 2019-01-17

## 2019-01-17 PROBLEM — O26.13 POOR WEIGHT GAIN OF PREGNANCY, THIRD TRIMESTER: Status: RESOLVED | Noted: 2018-11-30 | Resolved: 2019-01-17

## 2019-01-17 RX ORDER — MEDROXYPROGESTERONE ACETATE 150 MG/ML
150 INJECTION, SUSPENSION INTRAMUSCULAR
Status: DISCONTINUED | OUTPATIENT
Start: 2019-01-17 | End: 2019-10-31

## 2019-01-17 RX ADMIN — MEDROXYPROGESTERONE ACETATE 150 MG: 150 INJECTION, SUSPENSION INTRAMUSCULAR at 15:59

## 2019-01-17 NOTE — NURSING NOTE
Prior to injection, I verified the patient identity using patient's name and date of birth. Patient was instructed to report any adverse reaction to me immediately.    I administered the injection to Julio Cesar Trejo Cha.    Was entire vial of medication used? Yes    Did the patient bring this medication to the clinic to be injected? No    Name of provider who requested the injection:   Name of provider on site (faculty or community preceptor) at the time of performing the injection:     Date of next injection: 4/4/19-5/2/19  Date of next office visit with provider to renew medication plan (must be seen annually): 01/17/2020    Damari Man CMA

## 2019-01-17 NOTE — PROGRESS NOTES
HPI       Julio Cesar Cha is a 39 year old  who presents for   Chief Complaint   Patient presents with     RECHECK     2 week post postpardum      Had a  4 weeks ago. Delivered 20 min after arrival. No stitches. PP Hb 10.5. Has enough PNV to take while breastfeeding. Breastfeeding going well. Had some initial nipple soreness, now resolved. Bleeding has stopped except for some light spotting. Mood good. Interested in depo today. Has used in the past without problems. Gets regular periods and no weight gain on depo. Has not had sex since delivery.    A [employer]  was used for  this visit.    +++++++    Problem, Medication and Allergy Lists were reviewed and updated if needed..    Patient is an established patient of this clinic..         Review of Systems:   Review of Systems         Physical Exam:     Vitals:    19 1449   BP: 122/83   Pulse: 74   Resp: 16   Temp: 97.7  F (36.5  C)   TempSrc: Oral   SpO2: 99%   Weight: 67 kg (147 lb 9.6 oz)     Body mass index is 25.53 kg/m .       Physical Exam  Pt in no acute distress.  PHQ9 score 0    Results:   No testing ordered today    Assessment and Plan        4 week postpartum visit - doing well.    Plan: Depo provera 150 mg IM today. OK for 1 year.    Visit length 15 min, all spent counseling re sxs and plan.       There are no discontinued medications.    Options for treatment and follow-up care were reviewed with the patient. Julio Cesar Cha  engaged in the decision making process and verbalized understanding of the options discussed and agreed with the final plan.    Ying Mahan MD

## 2019-01-17 NOTE — NURSING NOTE
Due to patient being non-English speaking/uses sign language, an  was used for this visit. Only for face-to-face interpretation by an external agency, date and length of interpretation can be found on the scanned worksheet.     name: Niko Trejo   Agency: [employer]  Language: Marshallese   Telephone number: [phone]  Type of interpretation: Face-to-face, spoken

## 2019-04-02 DIAGNOSIS — B37.2 YEAST INFECTION OF THE SKIN: Primary | ICD-10-CM

## 2019-04-02 RX ORDER — NYSTATIN 100000 U/G
CREAM TOPICAL 4 TIMES DAILY
Qty: 30 G | Refills: 1 | Status: SHIPPED | OUTPATIENT
Start: 2019-04-02 | End: 2020-04-16

## 2019-04-02 NOTE — PROGRESS NOTES
Baby has thrush will treat mom's nipples to prevent passing this back and forth.    MICHELLE Finn, CNP

## 2019-07-09 ENCOUNTER — OFFICE VISIT (OUTPATIENT)
Dept: FAMILY MEDICINE | Facility: CLINIC | Age: 39
End: 2019-07-09
Payer: COMMERCIAL

## 2019-07-09 VITALS
DIASTOLIC BLOOD PRESSURE: 72 MMHG | HEART RATE: 94 BPM | TEMPERATURE: 98.1 F | BODY MASS INDEX: 27.89 KG/M2 | WEIGHT: 161.2 LBS | OXYGEN SATURATION: 99 % | SYSTOLIC BLOOD PRESSURE: 105 MMHG

## 2019-07-09 DIAGNOSIS — Z30.42 ENCOUNTER FOR SURVEILLANCE OF INJECTABLE CONTRACEPTIVE: Primary | ICD-10-CM

## 2019-07-09 LAB — HCG UR QL: NEGATIVE

## 2019-07-09 RX ORDER — SWAB
1 SWAB, NON-MEDICATED MISCELLANEOUS DAILY
Qty: 90 EACH | Refills: 2 | Status: SHIPPED | OUTPATIENT
Start: 2019-07-09 | End: 2020-04-16

## 2019-07-09 RX ORDER — MEDROXYPROGESTERONE ACETATE 150 MG/ML
150 INJECTION, SUSPENSION INTRAMUSCULAR
Status: DISCONTINUED | OUTPATIENT
Start: 2019-07-09 | End: 2019-10-31

## 2019-07-09 RX ADMIN — MEDROXYPROGESTERONE ACETATE 150 MG: 150 INJECTION, SUSPENSION INTRAMUSCULAR at 11:12

## 2019-07-09 NOTE — PROGRESS NOTES
Preceptor Attestation:   Patient seen, evaluated and discussed with the resident. I have verified the content of the note, which accurately reflects my assessment of the patient and the plan of care.   Supervising Physician:  Janel Tinsley MD

## 2019-07-09 NOTE — NURSING NOTE
Clinic Administered Medication Documentation      Depo Provera Documentation    Prior to injection, verified patient identity using patient's name and date of birth. Medication was administered. Please see MAR and medication order for additional information.     BP: 105/72    LAST PAP/EXAM:   Lab Results   Component Value Date    PAP NIL 07/17/2018     URINE HCG:negative    NEXT INJECTION DUE: 9/24/19 - 10/8/19    Was entire vial of medication used? Yes  Vial/Syringe: Single dose vial  Expiration Date:  10/20    Name of provider who requested the medication administration: Dr. Loaiza  Name of provider on site (faculty or community preceptor) at the time of performing the medication administration: Dr. KRISTIN Tinsley    Date of next administration: September 24th thru October 22nd  Date of next office visit with provider to renew medication plan (must be seen annually): N/A    Claudine Bartlett MA on 7/9/2019 at 11:18 AM

## 2019-07-09 NOTE — PROGRESS NOTES
HPI       Julio Cesar Cha is a 39 year old  who presents for   Chief Complaint   Patient presents with     Contraception     depo shot       Contraception - Depo Injection   Last injection of Depo provera occurred on 01/2019. Patient reports loss to follow up due to loss of insurance. Denies any previous adverse reaction to injection including weight gain. She also reports having irregular periods prior to being on contraception. Though she is several months out from Depo injection, she continues to have very light periods. Most recently period was 2 weeks ago. She denies any current sexual activity. She is breast feeding.       A [employer]  was used for  this visit.      Problem, Medication and Allergy Lists were reviewed and updated if needed..    Patient is an established patient of this clinic..         Review of Systems:   Review of Systems   Constitutional: Negative for activity change and fatigue.   Cardiovascular: Negative for chest pain.   Genitourinary: Negative for pelvic pain, vaginal bleeding and vaginal pain.          Physical Exam:     Vitals:    07/09/19 1059   BP: 105/72   Pulse: 94   Temp: 98.1  F (36.7  C)   TempSrc: Oral   SpO2: 99%   Weight: 73.1 kg (161 lb 3.2 oz)     Body mass index is 27.89 kg/m .  Vitals were reviewed and were normal     Physical Exam   Constitutional: She appears well-developed and well-nourished. No distress.   Eyes: Conjunctivae are normal.   Cardiovascular: Intact distal pulses.   Pulmonary/Chest: Effort normal.   Neurological: She is alert.   Skin: Skin is warm and dry.   Psychiatric: She has a normal mood and affect. Thought content normal.         Results:      Results from this visit  Results for orders placed or performed in visit on 07/09/19   HCG Qualitative Urine (UPT) ([facility])   Result Value Ref Range    HCG Qual Urine NEGATIVE Negative       Assessment and Plan        Julio Cesar was seen today for contraception.    Diagnoses and all orders  for this visit:    Encounter for surveillance of injectable contraceptive  Beta HCG negative with no other recent sexual activity. Will restart Depo. Encouraged patient to follow up as needed every 3 months for continuous birth control coverage. If any lapse, patient would require provider visit with beta HCG. Depo ordered for 1 year of clinical administration 07/2019-07/2020  Comments:  Restart of Depo injection on 07/2019, annual surveillance required on 07/2020  Orders:  -     HCG Qualitative Urine (UPT) ([facility])  -     medroxyPROGESTERone (DEPO-PROVERA) syringe 150 mg    Patient is a currently breast-feeding mother  Refill request. Patient is currently breastfeeding. Discussed using multivitamin if patient desires after finishing with breastfeeding.   -     Prenatal Vit-Fe Fumarate-FA (PRENATAL MULTIVITAMIN  WITH IRON) 28-0.8 MG TABS; Take 1 tablet by mouth daily       Medications Discontinued During This Encounter   Medication Reason     ibuprofen (ADVIL/MOTRIN) 600 MG tablet      sennosides (SENOKOT) 8.6 MG tablet      Prenatal Vit-Fe Fumarate-FA (PRENATAL MULTIVITAMIN  WITH IRON) 28-0.8 MG TABS Reorder       Options for treatment and follow-up care were reviewed with the patient. Julio Cesar Cha  engaged in the decision making process and verbalized understanding of the options discussed and agreed with the final plan.    Tashia Loaiza MD  [facility] Resident PGY-3  [phone]

## 2019-07-09 NOTE — NURSING NOTE
Due to patient being non-English speaking/uses sign language, an  was used for this visit. Only for face-to-face interpretation by an external agency, date and length of interpretation can be found on the scanned worksheet.     name: Honey Arce  Agency: [employer]  Language: Dutch   Telephone number: [phone]  Type of interpretation: Face-to-face, spoken

## 2019-07-17 PROBLEM — Z30.42 ENCOUNTER FOR SURVEILLANCE OF INJECTABLE CONTRACEPTIVE: Status: ACTIVE | Noted: 2019-01-17

## 2019-07-17 PROBLEM — Z30.013 ENCOUNTER FOR INITIAL PRESCRIPTION OF INJECTABLE CONTRACEPTIVE: Status: RESOLVED | Noted: 2019-01-17 | Resolved: 2019-07-17

## 2019-07-17 ASSESSMENT — ENCOUNTER SYMPTOMS
ACTIVITY CHANGE: 0
FATIGUE: 0

## 2019-10-31 ENCOUNTER — OFFICE VISIT (OUTPATIENT)
Dept: FAMILY MEDICINE | Facility: CLINIC | Age: 39
End: 2019-10-31
Payer: COMMERCIAL

## 2019-10-31 VITALS
BODY MASS INDEX: 29.23 KG/M2 | WEIGHT: 165 LBS | HEART RATE: 75 BPM | SYSTOLIC BLOOD PRESSURE: 98 MMHG | TEMPERATURE: 98.1 F | OXYGEN SATURATION: 96 % | HEIGHT: 63 IN | RESPIRATION RATE: 16 BRPM | DIASTOLIC BLOOD PRESSURE: 67 MMHG

## 2019-10-31 DIAGNOSIS — Z30.42 ENCOUNTER FOR SURVEILLANCE OF INJECTABLE CONTRACEPTIVE: Primary | ICD-10-CM

## 2019-10-31 RX ORDER — MEDROXYPROGESTERONE ACETATE 150 MG/ML
150 INJECTION, SUSPENSION INTRAMUSCULAR
Status: DISCONTINUED | OUTPATIENT
Start: 2020-01-05 | End: 2020-04-21

## 2019-10-31 RX ADMIN — MEDROXYPROGESTERONE ACETATE 150 MG: 150 INJECTION, SUSPENSION INTRAMUSCULAR at 14:35

## 2019-10-31 ASSESSMENT — MIFFLIN-ST. JEOR: SCORE: 1388.69

## 2019-10-31 NOTE — PROGRESS NOTES
Preceptor Attestation:   Patient seen, evaluated and discussed with the resident.   I have verified the content of the note, which accurately reflects my assessment of the patient and the plan of care.   Supervising Physician:  Fazal Christine MD.

## 2019-10-31 NOTE — PROGRESS NOTES
"       HPI       Julio Cesar Cha is a 39 year old  who presents for   Chief Complaint   Patient presents with     Imm/Inj     birth control       Restart Dep  Patient is on day 4 of bleeding. Denies any intercourse as spouse is out of the [place]. Was unable to do expected 3 month follow up. Denies any adverse reaction to Depo. Would like to continue with this form of birth control. Reports intermittent lighter periods while on Depo.      A [employer]  was used for  this visit.      Problem, Medication and Allergy Lists were reviewed and updated if needed..    Patient is an established patient of this clinic..         Review of Systems:   Review of Systems  5 point ROS neg other than the symptoms noted above in the HPI.         Physical Exam:     Vitals:    10/31/19 1602   BP: 98/67   Pulse: 75   Resp: 16   Temp: 98.1  F (36.7  C)   TempSrc: Oral   SpO2: 96%   Weight: 74.8 kg (165 lb)   Height: 1.61 m (5' 3.39\")     Body mass index is 28.87 kg/m .  Vitals were reviewed and were normal     Physical Exam  Constitutional:       Appearance: She is normal weight.   Eyes:      Conjunctiva/sclera: Conjunctivae normal.   Cardiovascular:      Pulses: Normal pulses.   Pulmonary:      Effort: Pulmonary effort is normal.   Abdominal:      General: Bowel sounds are normal.      Palpations: Abdomen is soft.   Skin:     General: Skin is warm and dry.   Neurological:      Mental Status: She is alert.   Psychiatric:         Thought Content: Thought content normal.             Results:   No testing ordered today    Assessment and Plan        Julio Cesar was seen today for imm/inj.    Encounter for surveillance of injectable contraceptive  Will restart annual surveillance as she has been unable to make 3 month appointment for Depo injection. Patient is aware she only needs to see a provider once a year if she is able to stay on time for injection. No acute concerns for pregnancy and is currently on her period.   -     " medroxyPROGESTERone (DEPO-PROVERA) syringe 150 mg       Medications Discontinued During This Encounter   Medication Reason     medroxyPROGESTERone (DEPO-PROVERA) syringe 150 mg      medroxyPROGESTERone (DEPO-PROVERA) syringe 150 mg        Options for treatment and follow-up care were reviewed with the patient. Julio Cesar Cha  engaged in the decision making process and verbalized understanding of the options discussed and agreed with the final plan.    Tashia Loaiza MD  [facility] Resident PGY-3  [phone]

## 2019-10-31 NOTE — NURSING NOTE
Due to patient being non-English speaking/uses sign language, an  was used for this visit. Only for face-to-face interpretation by an external agency, date and length of interpretation can be found on the scanned worksheet.     name: Honey Arce  Agency: [employer]  Language: Equ[place]an   Telephone number: [phone]  Type of interpretation: Face-to-face, spoken

## 2019-10-31 NOTE — NURSING NOTE
Clinic Administered Medication Documentation    MEDICATION LIST:   Depo Provera Documentation    Prior to injection, verified patient identity using patient's name and date of birth. Medication was administered. Please see MAR and medication order for additional information. Patient instructed to remain in clinic for 15 minutes.    BP: Data Unavailable    LAST PAP/EXAM:   Lab Results   Component Value Date    PAP NIL 07/17/2018     URINE HCG:not indicated    NEXT INJECTION DUE: 1/16/20 - 1/30/20    Was entire vial of medication used? Yes  Vial/Syringe: Single dose vial  Expiration Date:  01/2021

## 2019-11-06 PROBLEM — Z30.42 ENCOUNTER FOR SURVEILLANCE OF INJECTABLE CONTRACEPTIVE: Status: ACTIVE | Noted: 2019-11-06

## 2020-04-16 ENCOUNTER — OFFICE VISIT (OUTPATIENT)
Dept: FAMILY MEDICINE | Facility: CLINIC | Age: 40
End: 2020-04-16
Payer: COMMERCIAL

## 2020-04-16 VITALS
WEIGHT: 161 LBS | BODY MASS INDEX: 28.17 KG/M2 | DIASTOLIC BLOOD PRESSURE: 69 MMHG | HEART RATE: 60 BPM | OXYGEN SATURATION: 100 % | SYSTOLIC BLOOD PRESSURE: 108 MMHG | RESPIRATION RATE: 16 BRPM

## 2020-04-16 DIAGNOSIS — Z30.012 EMERGENCY CONTRACEPTION: ICD-10-CM

## 2020-04-16 DIAGNOSIS — Z72.51 UNPROTECTED SEXUAL INTERCOURSE: ICD-10-CM

## 2020-04-16 DIAGNOSIS — Z30.42 ENCOUNTER FOR SURVEILLANCE OF INJECTABLE CONTRACEPTIVE: Primary | ICD-10-CM

## 2020-04-16 LAB — HCG UR QL: NEGATIVE

## 2020-04-16 NOTE — PROGRESS NOTES
HPI       Julio Cesar Cha is a 40 year old  who presents for   Chief Complaint   Patient presents with     Contraception     Patient is a 40-year-old female here to discuss contraception.  The patient has been on Depo and reports no issues with that.  She last had her injection in early January, over 4 months ago.  She has resumed having menstrual periods with her last menstrual period on 4/2/2020, 14 days ago.  Last sexual activity was unprotected intercourse 3 days ago.  Does not desire pregnancy at this time.  She also does not desire pregnancy in the future as she has 7 children from ages 1-18.     A [employer]  was used for  this visit.    +++++++    Problem, Medication and Allergy Lists were reviewed and updated if needed..    Patient is an established patient of this clinic..         Review of Systems:   Review of Systems  A greater than four-point review of systems was completed and negative other than on the HPI.       Physical Exam:     Vitals:    04/16/20 1029   BP: 108/69   Pulse: 60   Resp: 16   SpO2: 100%   Weight: 73 kg (161 lb)     Body mass index is 28.17 kg/m .  Vitals were reviewed and were normal     Physical Exam  Constitutional:       General: She is not in acute distress.     Appearance: Normal appearance. She is not ill-appearing, toxic-appearing or diaphoretic.   HENT:      Head: Normocephalic and atraumatic.   Eyes:      Conjunctiva/sclera: Conjunctivae normal.   Cardiovascular:      Rate and Rhythm: Normal rate.   Pulmonary:      Effort: Pulmonary effort is normal. No respiratory distress.   Musculoskeletal: Normal range of motion.   Skin:     General: Skin is warm and dry.   Neurological:      General: No focal deficit present.      Mental Status: She is alert.   Psychiatric:         Mood and Affect: Mood normal.         Behavior: Behavior normal.           Results:      Results from this visit  Results for orders placed or performed in visit on 04/16/20   HCG  Qualitative Urine (UPT) ([facility])     Status: None   Result Value Ref Range    HCG Qual Urine NEGATIVE Negative       Assessment and Plan        1. Encounter for surveillance of injectable contraceptive  Patient is not a candidate for Depakote today given unprotected intercourse 3 days ago greater than 7 days after her LMP.  We discussed contraceptive options since this is the second time in a row she has been late for her depo shot.  She is interested in a long-acting implantable contraceptive such as Nexplanon.  Pregnancy test today was negative.  Based off of the reproductive axis project algorithm she would be a candidate for Nexplanon in 5 days.  Schedule the patient for [facility] procedure clinic for 5 days from today.  - HCG Qualitative Urine (UPT) ([facility])  - Colposcopy/Gynecology Clinic-[facility] INTERNAL REFERRAL    2. Unprotected sexual intercourse  3. Emergency contraception  Given the patient's recent unprotected intercourse and being a month late on her depo she was given Mireya.  Discussed the importance of taking this is soon as possible and follow-up for a reliable long-acting contraceptive option.  - Ulipristal Acetate (MIREYA) 30 MG tablet; Take 1 tablet (30 mg) by mouth once for 1 dose  Dispense: 1 tablet; Refill: 0       Medications Discontinued During This Encounter   Medication Reason     Prenatal Vit-Fe Fumarate-FA (PRENATAL MULTIVITAMIN  WITH IRON) 28-0.8 MG TABS      nystatin (MYCOSTATIN) 635090 UNIT/GM external cream      ferrous sulfate (IRON) 325 (65 Fe) MG tablet      acetaminophen (TYLENOL) 500 MG tablet        Options for treatment and follow-up care were reviewed with the patient. Julio Cesar Cha  engaged in the decision making process and verbalized understanding of the options discussed and agreed with the final plan.    Jake Umana MD

## 2020-04-16 NOTE — PATIENT INSTRUCTIONS
Here is the plan from today's visit    1. Encounter for surveillance of injectable contraceptive  Follow up Tuesday for nexplanon.   - HCG Qualitative Urine (UPT) ([facility])  - Colposcopy/Gynecology Clinic-[facility] INTERNAL REFERRAL    2. Unprotected sexual intercourse  - Ulipristal Acetate (RHINA) 30 MG tablet; Take 1 tablet (30 mg) by mouth once for 1 dose  Dispense: 1 tablet; Refill: 0    3. Emergency contraception  - Ulipristal Acetate (RHINA) 30 MG tablet; Take 1 tablet (30 mg) by mouth once for 1 dose  Dispense: 1 tablet; Refill: 0      Please call or return to clinic if your symptoms don't go away.    Follow up plan  Tuesday 220 for nexplanon.     Thank you for coming to [facility] today.  Lab Testing:  **If you had lab testing today and your results are reassuring or normal they will be mailed to you or sent through [facility] within 7 days.   **If the lab tests need quick action we will call you with the results.  The phone number we will call with results is # [phone] (home) . If this is not the best number please call our clinic and change the number.  Medication Refills:  If you need any refills please call your pharmacy and they will contact us.   If you need to  your refill at a new pharmacy, please contact the new pharmacy directly. The new pharmacy will help you get your medications transferred faster.   Scheduling:  If you have any concerns about today's visit or wish to schedule another appointment please call our office during normal business hours [phone] (8-5:00 M-F)  If a referral was made to a [facility] and you don't get a call from central scheduling please call [phone].  If a Mammogram was ordered for you at [facility] call [phone] to schedule or change your appointment.  If you had an XRay/CT/Ultrasound/MRI ordered the number is [phone] to schedule or change your radiology appointment.   Medical Concerns:  If you have urgent  medical concerns please call [phone] at any time of the day.    Jake Umana MD

## 2020-04-16 NOTE — NURSING NOTE
Due to patient being non-English speaking/uses sign language, an  was used for this visit. Only for face-to-face interpretation by an external agency, date and length of interpretation can be found on the scanned worksheet.     name: Honey Arce  Agency: [employer]  Language: Sri Lankan   Telephone number: [phone]  Type of interpretation: Face-to-face, spoken

## 2020-04-21 ENCOUNTER — OFFICE VISIT (OUTPATIENT)
Dept: FAMILY MEDICINE | Facility: CLINIC | Age: 40
End: 2020-04-21
Payer: COMMERCIAL

## 2020-04-21 VITALS
TEMPERATURE: 97.6 F | HEART RATE: 63 BPM | WEIGHT: 161 LBS | DIASTOLIC BLOOD PRESSURE: 81 MMHG | OXYGEN SATURATION: 100 % | RESPIRATION RATE: 16 BRPM | BODY MASS INDEX: 28.17 KG/M2 | SYSTOLIC BLOOD PRESSURE: 131 MMHG

## 2020-04-21 DIAGNOSIS — Z30.017 INSERTION OF IMPLANTABLE SUBDERMAL CONTRACEPTIVE: ICD-10-CM

## 2020-04-21 DIAGNOSIS — Z30.017 NEXPLANON INSERTION: Primary | ICD-10-CM

## 2020-04-21 PROBLEM — Z30.42 ENCOUNTER FOR SURVEILLANCE OF INJECTABLE CONTRACEPTIVE: Status: RESOLVED | Noted: 2019-11-06 | Resolved: 2020-04-21

## 2020-04-21 LAB — HCG UR QL: NEGATIVE

## 2020-04-21 NOTE — PROGRESS NOTES
Procedure Note - Etonogestrel Implant Insertion  HPI: Julio Cesar Cha is a patient of Jeanne Johns here for Nexplanon/Implanon (etonogestrel implant) insertion.  Indication: unwanted fertility  Keeps forgetting to have depo shots  LMP   4/1  STI history:   none  Prev Contraception? depo shot- kept forgetting them  Smoking?  No    Counselling and Consent:  Affirmation of informed consent was signed and scanned into the medical record. Risks, benefits and alternatives were discussed. Discussed potential side effects of the etonogestrel implant including the risk of irregular bleeding that may persist across the 3 yrs of use.  Instructed on use of condoms for STI prevention.  Patient's questions were elicited and answered.      Procedure safety checklist was completed:  Yes  Time Out (Pause for the Cause) completed: Yes    Labs: UPT negative  Preoperative Diagnosis:  Unwanted fertility  Postoperative Diagnosis:  same     Technique:   Skin prep Betadine  Anesthesia 1% lidocaine  Suture  No  EBL:   minimal  Complications: No  Tolerance:  Pt tolerated procedure well and was in stable condition.     Pt was positioned on exam table with left arm flexed and externally rotated. Area was marked for insertion 8cm frm the medial epicondyle along the sulcus between the biceps and triceps. Anesthesia provided at the insertion site and along the insertion track and then the area was prepped with betadine. Etonogestrel implant was then inserted subdermally in usual fashion. Provider and patient confirmed placement by palpating the device. Pressure dressing applied and procedure complete.     Follow up:  Pt was instructed to call if bleeding, severe pain or foul smell.  Instructed to remove pressure dressing after 24 hours, then may keep insertion site covered with a bandaid until it is healed.  Instructed that she requires removal or replacement of the device in 5 years.  Lot Number [id number]    Follow up in 2-4 weeks as  needed.    Resident: Sinan Garcia MD  Faculty: Mahogany Basilio MD present for and supervised this entire procedure.

## 2020-04-21 NOTE — PROGRESS NOTES
Preceptor Attestation:   Patient seen, evaluated and discussed with the resident. I was present for and supervised the entire procedure. I have verified the content of the note, which accurately reflects my assessment of the patient and the plan of care.   Supervising Physician:  Mahogany Basilio MD.

## 2020-04-21 NOTE — RESULT ENCOUNTER NOTE
Discussed the following results during the clinic visit. See progress note for details.     Mahogany Basilio MD

## 2020-04-22 NOTE — NURSING NOTE
Due to patient being non-English speaking/uses sign language, an  was used for this visit. Only for face-to-face interpretation by an external agency, date and length of interpretation can be found on the scanned worksheet.     name: Tan  Agency: [employer] - telephone  Language: Wallisian   Telephone number: [phone]  Type of interpretation: Face-to-face, spoken

## 2020-05-12 ENCOUNTER — TELEPHONE (OUTPATIENT)
Dept: FAMILY MEDICINE | Facility: CLINIC | Age: 40
End: 2020-05-12

## 2020-05-12 ENCOUNTER — VIRTUAL VISIT (OUTPATIENT)
Dept: FAMILY MEDICINE | Facility: CLINIC | Age: 40
End: 2020-05-12
Payer: COMMERCIAL

## 2020-05-12 DIAGNOSIS — L27.2 DERMATITIS DUE TO FOOD TAKEN INTERNALLY: Primary | ICD-10-CM

## 2020-05-12 DIAGNOSIS — E55.9 HYPOVITAMINOSIS D: ICD-10-CM

## 2020-05-12 RX ORDER — DIPHENHYDRAMINE HCL 25 MG
25 CAPSULE ORAL EVERY 6 HOURS PRN
Qty: 30 CAPSULE | Refills: 1 | Status: SHIPPED | OUTPATIENT
Start: 2020-05-12 | End: 2021-02-26

## 2020-05-12 RX ORDER — CHOLECALCIFEROL (VITAMIN D3) 50 MCG
1 TABLET ORAL DAILY
Qty: 90 TABLET | Refills: 3 | Status: SHIPPED | OUTPATIENT
Start: 2020-05-12 | End: 2021-02-26

## 2020-05-12 RX ORDER — HYDROCORTISONE VALERATE CREAM 2 MG/G
CREAM TOPICAL 2 TIMES DAILY PRN
Qty: 60 G | Refills: 1 | Status: SHIPPED | OUTPATIENT
Start: 2020-05-12 | End: 2020-05-12

## 2020-05-12 RX ORDER — TRIAMCINOLONE ACETONIDE 0.25 MG/G
OINTMENT TOPICAL 2 TIMES DAILY PRN
Qty: 80 G | Refills: 3 | Status: SHIPPED | OUTPATIENT
Start: 2020-05-12 | End: 2021-02-26

## 2020-05-12 NOTE — PROGRESS NOTES
Telephone Preceptor Attestation:   Patient spoken with and discussed with the resident. I have verified the content of the note, which accurately reflects my assessment of the patient and the plan of care.   Supervising Physician:  Graciela Engel MD

## 2020-05-12 NOTE — PROGRESS NOTES
Addendum:  Received message that WestCort not available at pharmacy, changed to different topical steroid p5 (low-mid) triamcinolone 0.025% ointment.    Shannon Hill DO  PGY3 Family Medicine Resident  Pager: [phone]

## 2020-05-12 NOTE — PROGRESS NOTES
"Family Medicine Telephone Visit Note           Telephone Visit Consent   Patient was verbally read the following and verbal consent was obtained.    \"Telephone visits are billed at different rates depending on your insurance coverage. During this emergency period, for some insurers they may be billed the same as an in-person visit.  Please reach out to your insurance provider with any questions.  If during the course of the call the physician/provider feels a telephone visit is not appropriate, you will not be charged for this service.\"    Name person giving consent:  Patient   Date verbal consent given:  5/12/2020  Time verbal consent given:  1:49 PM     Chief Complaint   Patient presents with     Medication Request     pt states to have a fruit allergy that has made her feel itchy on her neck and extremities. pt says to have been prescribed a ointment for her itchiness and pt is requesting a refill on the med  but does not recall the name of the medication as ot was prescribed a year ago as pt states.      Refill Request     refill request for vitamin d     Due to patient being non-English speaking/uses sign language, an  was used for this visit. Only for face-to-face interpretation by an external agency, date and length of interpretation can be found on the scanned worksheet.     name: Moraima  Agency: [employer]  Language: Belarusian   Telephone number: [phone]  Type of interpretation: Telephone, spoken         HPI   Patients name: Julio Cesar  Appointment start time:  2:04 PM    COVID check in: Patient says she has no problems, and her and her family are healthy.    Patient presents for telephone visit today for refill of   - Vitamin D  - Topical cream for skin allergy    - She is eating a lot of fruit   - She gets itchiness on joints on hands/fingers and back of neck   - Was prescribed a topical cream that worked well for her in the past, about 1 year ago   - She said the skin sometimes can " "breakdown and bleed, but not right now - redness   - In the past she used to take benadryl, but now finds the cream helpful   - Would like topical medicine and benadryl    Current Outpatient Medications   Medication Sig Dispense Refill     Ulipristal Acetate (RHINA) 30 MG tablet Take 1 tablet (30 mg) by mouth once for 1 dose 1 tablet 0     VITAMIN D, CHOLECALCIFEROL, PO Take by mouth daily       Allergies   Allergen Reactions     Omeprazole Diarrhea          Review of Systems:     Constitutional, HEENT, cardiovascular, pulmonary, gi and gu systems are negative, except as otherwise noted.         Physical Exam:     There were no vitals taken for this visit.  Estimated body mass index is 28.17 kg/m  as calculated from the following:    Height as of 10/31/19: 1.61 m (5' 3.39\").    Weight as of 4/21/20: 73 kg (161 lb).    Unable to perform secondary to virtual visit over the telephone        Assessment and Plan       ICD-10-CM    1. Dermatitis due to food taken internally  L27.2 hydrocortisone (WESTCORT) 0.2 % external cream     diphenhydrAMINE (BENADRYL) 25 MG capsule   2. Hypovitaminosis D  E55.9 vitamin D3 (CHOLECALCIFEROL) 2000 units (50 mcg) tablet     Patient calling with concerns for itchy rash on hands and back of neck, she reports having exact same rash and symptoms before when she eats a lot of fruit, treated before with Benadryl and topical cream.  Per chart review it appears patient was prescribed hydrocortisone ointment 0.2%, Westcort.  Provided patient with new prescription, as well as Benadryl capsule 25 mg every 6 hours as needed itching and rash.  Also refilled patient's vitamin D.  Instructed patient to call back in 1 to 2 weeks if no improvement in rash.    Refilled medications that would be required in the next 3 months.     After Visit Information:  Patient declined AVS     No follow-ups on file.    Appointment end time: 2:12 PM  This is a telephone visit that took 8 minutes.      Clinician location: "  [facility]    Shannon Hill, DO  I precepted today with Dr. Engel who spoke to patient on the phone.

## 2020-05-12 NOTE — TELEPHONE ENCOUNTER
Patient's pharmacy called to check the status of this medication question. Patient does need something to be prescribed today.    Iveth Fischer, Senior Patient Representative/

## 2020-05-12 NOTE — TELEPHONE ENCOUNTER
Hydrocortisone Cream 0.2%     This medication is nonformulary.  Pt states she needs something today.    Please send change in medication as soon as possible.    Thank you,  Marizol Adam, [facility]  [phone]

## 2020-07-13 ENCOUNTER — TELEPHONE (OUTPATIENT)
Dept: FAMILY MEDICINE | Facility: CLINIC | Age: 40
End: 2020-07-13

## 2020-07-13 RX ORDER — PRENATAL VIT/IRON FUM/FOLIC AC 27MG-0.8MG
1 TABLET ORAL DAILY
Qty: 90 TABLET | Refills: 3 | Status: SHIPPED | OUTPATIENT
Start: 2020-07-13 | End: 2020-09-15

## 2020-07-13 NOTE — TELEPHONE ENCOUNTER
"Request for medication refill: Prenatal 28-0.8mg not on med list    Providers if patient needs an appointment and you are willing to give a one month supply please refill for one month and  send a letter/MyChart using \".SMILLIMITEDREFILL\" .smillimited and route chart to \"P [facility] \" (Giving one month refill in non controlled medications is strongly recommended before denial)    If refill has been denied, meaning absolutely no refills without visit, please complete the smart phrase \".smirxrefuse\" and route it to the \"P SMI MED REFILLS\"  pool to inform the patient and the pharmacy.    Blanche Hope, [facility]        "

## 2020-09-15 DIAGNOSIS — Z13.9 SCREENING FOR CONDITION: Primary | ICD-10-CM

## 2020-09-15 DIAGNOSIS — Z13.9 SCREENING FOR CONDITION: ICD-10-CM

## 2020-09-15 LAB — HCG UR QL: NEGATIVE

## 2020-09-15 RX ORDER — PRENATAL VIT/IRON FUM/FOLIC AC 27MG-0.8MG
1 TABLET ORAL DAILY
Qty: 90 TABLET | Refills: 3 | Status: SHIPPED | OUTPATIENT
Start: 2020-09-15 | End: 2021-02-26

## 2020-09-16 ENCOUNTER — TELEPHONE (OUTPATIENT)
Dept: FAMILY MEDICINE | Facility: CLINIC | Age: 40
End: 2020-09-16

## 2020-09-16 NOTE — TELEPHONE ENCOUNTER
Via  OLIVIA, RN left message for patient to call back. When patient calls back, please relay message below from Dr. Lopez. Okay to transfer to any available RN if they have questions.   Nidia Quiñones, MARISELA            Please call patient with the following message:   Hi, your pregnancy test was NEGATIVE!     Thanks Jeanne Lopez MD

## 2020-09-16 NOTE — RESULT ENCOUNTER NOTE
Please call patient with the following message:  Hi, your pregnancy test was NEGATIVE!    Thanks Jeanne Lopez MD

## 2020-09-17 NOTE — TELEPHONE ENCOUNTER
With aid of  # [id number] spoke with patient and relayed information below. She verbalized understanding and had no further questions.    Kelin Hendricks RN

## 2020-10-19 ENCOUNTER — OFFICE VISIT (OUTPATIENT)
Dept: FAMILY MEDICINE | Facility: CLINIC | Age: 40
End: 2020-10-19
Payer: COMMERCIAL

## 2020-10-19 ENCOUNTER — ANCILLARY PROCEDURE (OUTPATIENT)
Dept: GENERAL RADIOLOGY | Facility: CLINIC | Age: 40
End: 2020-10-19
Attending: FAMILY MEDICINE
Payer: COMMERCIAL

## 2020-10-19 VITALS
SYSTOLIC BLOOD PRESSURE: 112 MMHG | HEART RATE: 80 BPM | WEIGHT: 152 LBS | BODY MASS INDEX: 26.93 KG/M2 | DIASTOLIC BLOOD PRESSURE: 60 MMHG | HEIGHT: 63 IN | OXYGEN SATURATION: 100 % | RESPIRATION RATE: 16 BRPM | TEMPERATURE: 98 F

## 2020-10-19 DIAGNOSIS — R10.13 EPIGASTRIC PAIN: ICD-10-CM

## 2020-10-19 DIAGNOSIS — R10.13 EPIGASTRIC PAIN: Primary | ICD-10-CM

## 2020-10-19 DIAGNOSIS — Z23 NEED FOR PROPHYLACTIC VACCINATION AND INOCULATION AGAINST INFLUENZA: ICD-10-CM

## 2020-10-19 LAB — HCG UR QL: NEGATIVE

## 2020-10-19 PROCEDURE — 81025 URINE PREGNANCY TEST: CPT | Performed by: FAMILY MEDICINE

## 2020-10-19 PROCEDURE — 90471 IMMUNIZATION ADMIN: CPT | Performed by: FAMILY MEDICINE

## 2020-10-19 PROCEDURE — 90686 IIV4 VACC NO PRSV 0.5 ML IM: CPT | Performed by: FAMILY MEDICINE

## 2020-10-19 PROCEDURE — 74019 RADEX ABDOMEN 2 VIEWS: CPT | Performed by: RADIOLOGY

## 2020-10-19 PROCEDURE — 99214 OFFICE O/P EST MOD 30 MIN: CPT | Mod: 25 | Performed by: FAMILY MEDICINE

## 2020-10-19 RX ORDER — POLYETHYLENE GLYCOL 3350 17 G/17G
1 POWDER, FOR SOLUTION ORAL DAILY PRN
Qty: 1 BOTTLE | Refills: 0 | Status: SHIPPED | OUTPATIENT
Start: 2020-10-19 | End: 2021-02-26

## 2020-10-19 RX ORDER — FAMOTIDINE 40 MG/1
40 TABLET, FILM COATED ORAL DAILY
Qty: 30 TABLET | Refills: 1 | Status: SHIPPED | OUTPATIENT
Start: 2020-10-19 | End: 2021-02-26

## 2020-10-19 ASSESSMENT — MIFFLIN-ST. JEOR: SCORE: 1318.6

## 2020-10-19 NOTE — PROGRESS NOTES
"xra abSUBJECTIVE:  Julio Cesar Cha, a 40 year old female scheduled an appointment to discuss the following issues:  Need for prophylactic vaccination and inoculation against influenza  Abdominal pain. Epigastric pain. 2months. Nauseated. Poor appetite. Some emesis. No black/bloody stools. BM about once daily. Pain worse with hungry.  No surgery. No urine changes or hematuria. No history gallstone/kidney stones. No menses [id number] on nexplanon.  No over the counter meds. No history ulcers. Pressure sensation. No shortness of breath. No changes with position. No cardiac or lung issues. Non-smoker.     Medical, social, surgical, and family histories reviewed.    ROS:  All other ROS negative.     OBJECTIVE:  /60   Pulse 80   Temp 98  F (36.7  C) (Tympanic)   Resp 16   Ht 1.6 m (5' 3\")   Wt 68.9 kg (152 lb)   SpO2 100%   BMI 26.93 kg/m    EXAM:  GENERAL APPEARANCE: healthy, alert and no distress  EYES: EOMI,  PERRL  NECK: no adenopathy, no asymmetry, masses, or scars and thyroid normal to palpation  RESP: lungs clear to auscultation - no rales, rhonchi or wheezes  CV: regular rates and rhythm, normal S1 S2, no S3 or S4 and no murmur, click or rub -  ABDOMEN:  soft, nontender, no HSM or masses and bowel sounds normal  MS: extremities normal- no gross deformities noted, no evidence of inflammation in joints, FROM in all extremities.  PSYCH: mentation appears normal and affect normal/bright    ASSESSMENT / PLAN:  (R10.13) Epigastric pain  (primary encounter diagnosis)  Comment: constipation vs gastritis vs ?  Plan: XR Abdomen 2 Views, HCG Qual, Urine ([id number]),         GENERAL SURG ADULT REFERRAL, omeprazole         (PRILOSEC) 20 MG DR capsule, polyethylene         glycol (MIRALAX) 17 GM/Dose powder        Follow-up general surgery if not improving overall in next couple weeks or keeps coming back. Expected course and warning signs reviewed.. Call/email with questions/concerns. Limit spicy foods and " drink more water/fiber in diet. Shortness of breath or a lot worse to ER. Reveiwed risks and side effects of medication. Egd/ultrasound?    (Z23) Need for prophylactic vaccination and inoculation against influenza  Plan: INFLUENZA VACCINE IM > 6 MONTHS VALENT IIV4         [03214], Vaccine Administration, Initial         [38260]            Sinan Wells MD

## 2020-10-27 ENCOUNTER — OFFICE VISIT (OUTPATIENT)
Dept: SURGERY | Facility: CLINIC | Age: 40
End: 2020-10-27
Attending: FAMILY MEDICINE
Payer: COMMERCIAL

## 2020-10-27 ENCOUNTER — TELEPHONE (OUTPATIENT)
Dept: FAMILY MEDICINE | Facility: CLINIC | Age: 40
End: 2020-10-27

## 2020-10-27 VITALS
OXYGEN SATURATION: 100 % | SYSTOLIC BLOOD PRESSURE: 118 MMHG | HEIGHT: 66 IN | BODY MASS INDEX: 24.75 KG/M2 | HEART RATE: 89 BPM | DIASTOLIC BLOOD PRESSURE: 76 MMHG | WEIGHT: 154 LBS

## 2020-10-27 DIAGNOSIS — R10.13 EPIGASTRIC PAIN: ICD-10-CM

## 2020-10-27 LAB — B-HCG SERPL-ACNC: <1 IU/L (ref 0–5)

## 2020-10-27 PROCEDURE — 84702 CHORIONIC GONADOTROPIN TEST: CPT | Performed by: SURGERY

## 2020-10-27 PROCEDURE — 36415 COLL VENOUS BLD VENIPUNCTURE: CPT | Performed by: SURGERY

## 2020-10-27 PROCEDURE — 99243 OFF/OP CNSLTJ NEW/EST LOW 30: CPT | Performed by: SURGERY

## 2020-10-27 RX ORDER — PANTOPRAZOLE SODIUM 40 MG/1
40 TABLET, DELAYED RELEASE ORAL DAILY
Qty: 30 TABLET | Refills: 11 | Status: SHIPPED | OUTPATIENT
Start: 2020-10-27 | End: 2021-02-04

## 2020-10-27 RX ORDER — SUCRALFATE 1 G/1
1 TABLET ORAL 4 TIMES DAILY
Qty: 120 TABLET | Refills: 3 | Status: SHIPPED | OUTPATIENT
Start: 2020-10-27 | End: 2021-02-26

## 2020-10-27 ASSESSMENT — MIFFLIN-ST. JEOR: SCORE: 1375.29

## 2020-10-27 NOTE — PROGRESS NOTES
"Dear Penny Smiley  I was asked to see this patient by Penny Vegas for please see below.  I have seen Juilo Cesar Cha and as you know his chief complaint is epigastric pain and makes her feel hungry.  When she does eat she feels nauseated.    No reflux.    No melena no hematemesis.   Has an implant in her left arm and she thinks this may be her problem. Was in 7 months ago  Was nauseated with pregnancy.      HPI:  Patient is a 40 year old female  with complaints see above  The patient noticed the symptoms about 3 months ago.    Patient has not family history of this problems  nothing makes the episode better.      Review Of Systems  Respiratory: No shortness of breath, dyspnea on exertion, cough, or hemoptysis  Cardiovascular: negative  Gastrointestinal: nausea  Endocrine: negative  :  negative    10 Point review of systems all others are negative.   /76   Pulse 89   Ht 1.676 m (5' 6\")   Wt 69.9 kg (154 lb)   SpO2 100%   BMI 24.86 kg/m      Past Medical History:   Diagnosis Date     Anemia 2/27/2012       Past Surgical History:   Procedure Laterality Date     ORTHOPEDIC SURGERY         Social History     Socioeconomic History     Marital status:      Spouse name: Not on file     Number of children: Not on file     Years of education: Not on file     Highest education level: Not on file   Occupational History     Not on file   Social Needs     Financial resource strain: Not on file     Food insecurity     Worry: Not on file     Inability: Not on file     Transportation needs     Medical: Not on file     Non-medical: Not on file   Tobacco Use     Smoking status: Never Smoker     Smokeless tobacco: Never Used   Substance and Sexual Activity     Alcohol use: No     Drug use: No     Sexual activity: Yes     Partners: Male   Lifestyle     Physical activity     Days per week: Not on file     Minutes per session: Not on file     Stress: Not on file   Relationships     " Social connections     Talks on phone: Not on file     Gets together: Not on file     Attends Quaker service: Not on file     Active member of club or organization: Not on file     Attends meetings of clubs or organizations: Not on file     Relationship status: Not on file     Intimate partner violence     Fear of current or ex partner: Not on file     Emotionally abused: Not on file     Physically abused: Not on file     Forced sexual activity: Not on file   Other Topics Concern      Service Not Asked     Blood Transfusions Not Asked     Caffeine Concern Not Asked     Occupational Exposure Not Asked     Hobby Hazards Not Asked     Sleep Concern Not Asked     Stress Concern Not Asked     Weight Concern Not Asked     Special Diet Not Asked     Back Care Not Asked     Exercise Yes     Comment: no regular     Bike Helmet Not Asked     Seat Belt Yes     Self-Exams Yes     Comment: BSE enc to do monthly   Social History Narrative     Not on file       Current Outpatient Medications   Medication Sig Dispense Refill     famotidine (PEPCID) 40 MG tablet Take 1 tablet (40 mg) by mouth daily x14 days then as needed 30 tablet 1     Prenatal Vit-Fe Fumarate-FA (PRENATAL MULTIVITAMIN W/IRON) 27-0.8 MG tablet Take 1 tablet by mouth daily 90 tablet 3     vitamin D3 (CHOLECALCIFEROL) 2000 units (50 mcg) tablet Take 1 tablet (2,000 Units) by mouth daily 90 tablet 3     diphenhydrAMINE (BENADRYL) 25 MG capsule Take 1 capsule (25 mg) by mouth every 6 hours as needed for itching or allergies (Patient not taking: Reported on 10/19/2020) 30 capsule 1     polyethylene glycol (MIRALAX) 17 GM/Dose powder Take 17 g (1 capful) by mouth daily as needed for constipation (Patient not taking: Reported on 10/27/2020) 1 Bottle 0     triamcinolone (KENALOG) 0.025 % external ointment Apply topically 2 times daily as needed for irritation (itching, rash) Apply to hands and/or neck (Patient not taking: Reported on 10/27/2020) 80 g 3      "Ulipristal Acetate (RHINA) 30 MG tablet Take 1 tablet (30 mg) by mouth once for 1 dose 1 tablet 0       Above was reviewed  Physical exam: /76   Pulse 89   Ht 1.676 m (5' 6\")   Wt 69.9 kg (154 lb)   SpO2 100%   BMI 24.86 kg/m     Patient able to get up on table without difficulty.   Patient is alert and orientated.   Head eyes, nose and mouth within normal limits.  No supraclavicular or cervical adenopathy palpated.  Thyroid within normal limits.    Lungs are clear to auscultation  Heart is regular rate and rhythm with mid systolic murmur or thrills noted.  No costal vertebral angle tenderness noted.  Abdomen is abdomen is soft without masses, organomegaly or guarding  bowel sounds are positive and no caput medusa noted.  She is most tender in the epigastric area but has a positive alejandra sign.      Skin was warm and pink  Normal Affect      Lower extremity edema is not present.        Assessment: patient with epigastric pain and positive alejandra sign.      Plan to do Carafate  For the Carafate take 1 pill and drop in glass of water and allow to dissolve for 30 minutes and then drink it. Do this every 6 hours.  Do for 1 month and if start having symptoms again on the other medications for reducing acid then can start it again.  If still having problems follow up with me or your regular doctor.  This one may affect absorption of other medications.  Talk to your pharmacy about this.  I can order the liquid form but most insurance does not cover it and this is a lot less expensive.   Will get ultrasound of the gallbladder   Protonix to stop acid release in the stomach and will get an upper endoscopy. .  And pregnancy test.  Time spent with the patient with greater that 50% of the time in discussion was 30 minutes.  In discussing the symptom and plan.   .     service did not work and patient is ok with her english.   Bobo Dia MD      "

## 2020-10-27 NOTE — LETTER
"    10/27/2020         RE: Julio Cesar Cha  [id number] [facility] [address]        Dear Colleague,    Thank you for referring your patient, Julio Cesar Cha, to the [facility]. Please see a copy of my visit note below.    Dear Penny Smiley  I was asked to see this patient by Penny Vegas for please see below.  I have seen Julio Cesar Cha and as you know his chief complaint is epigastric pain and makes her feel hungry.  When she does eat she feels nauseated.    No reflux.    No melena no hematemesis.   Has an implant in her left arm and she thinks this may be her problem. Was in 7 months ago  Was nauseated with pregnancy.      HPI:  Patient is a 40 year old female  with complaints see above  The patient noticed the symptoms about 3 months ago.    Patient has not family history of this problems  nothing makes the episode better.      Review Of Systems  Respiratory: No shortness of breath, dyspnea on exertion, cough, or hemoptysis  Cardiovascular: negative  Gastrointestinal: nausea  Endocrine: negative  :  negative    10 Point review of systems all others are negative.   /76   Pulse 89   Ht 1.676 m (5' 6\")   Wt 69.9 kg (154 lb)   SpO2 100%   BMI 24.86 kg/m      Past Medical History:   Diagnosis Date     Anemia 2/27/2012       Past Surgical History:   Procedure Laterality Date     ORTHOPEDIC SURGERY         Social History     Socioeconomic History     Marital status:      Spouse name: Not on file     Number of children: Not on file     Years of education: Not on file     Highest education level: Not on file   Occupational History     Not on file   Social Needs     Financial resource strain: Not on file     Food insecurity     Worry: Not on file     Inability: Not on file     Transportation needs     Medical: Not on file     Non-medical: Not on file   Tobacco Use     Smoking status: Never Smoker     Smokeless tobacco: Never " Used   Substance and Sexual Activity     Alcohol use: No     Drug use: No     Sexual activity: Yes     Partners: Male   Lifestyle     Physical activity     Days per week: Not on file     Minutes per session: Not on file     Stress: Not on file   Relationships     Social connections     Talks on phone: Not on file     Gets together: Not on file     Attends Congregational service: Not on file     Active member of club or organization: Not on file     Attends meetings of clubs or organizations: Not on file     Relationship status: Not on file     Intimate partner violence     Fear of current or ex partner: Not on file     Emotionally abused: Not on file     Physically abused: Not on file     Forced sexual activity: Not on file   Other Topics Concern      Service Not Asked     Blood Transfusions Not Asked     Caffeine Concern Not Asked     Occupational Exposure Not Asked     Hobby Hazards Not Asked     Sleep Concern Not Asked     Stress Concern Not Asked     Weight Concern Not Asked     Special Diet Not Asked     Back Care Not Asked     Exercise Yes     Comment: no regular     Bike Helmet Not Asked     Seat Belt Yes     Self-Exams Yes     Comment: BSE enc to do monthly   Social History Narrative     Not on file       Current Outpatient Medications   Medication Sig Dispense Refill     famotidine (PEPCID) 40 MG tablet Take 1 tablet (40 mg) by mouth daily x14 days then as needed 30 tablet 1     Prenatal Vit-Fe Fumarate-FA (PRENATAL MULTIVITAMIN W/IRON) 27-0.8 MG tablet Take 1 tablet by mouth daily 90 tablet 3     vitamin D3 (CHOLECALCIFEROL) 2000 units (50 mcg) tablet Take 1 tablet (2,000 Units) by mouth daily 90 tablet 3     diphenhydrAMINE (BENADRYL) 25 MG capsule Take 1 capsule (25 mg) by mouth every 6 hours as needed for itching or allergies (Patient not taking: Reported on 10/19/2020) 30 capsule 1     polyethylene glycol (MIRALAX) 17 GM/Dose powder Take 17 g (1 capful) by mouth daily as needed for constipation  "(Patient not taking: Reported on 10/27/2020) 1 Bottle 0     triamcinolone (KENALOG) 0.025 % external ointment Apply topically 2 times daily as needed for irritation (itching, rash) Apply to hands and/or neck (Patient not taking: Reported on 10/27/2020) 80 g 3     Ulipristal Acetate (RHINA) 30 MG tablet Take 1 tablet (30 mg) by mouth once for 1 dose 1 tablet 0       Above was reviewed  Physical exam: /76   Pulse 89   Ht 1.676 m (5' 6\")   Wt 69.9 kg (154 lb)   SpO2 100%   BMI 24.86 kg/m     Patient able to get up on table without difficulty.   Patient is alert and orientated.   Head eyes, nose and mouth within normal limits.  No supraclavicular or cervical adenopathy palpated.  Thyroid within normal limits.    Lungs are clear to auscultation  Heart is regular rate and rhythm with mid systolic murmur or thrills noted.  No costal vertebral angle tenderness noted.  Abdomen is abdomen is soft without masses, organomegaly or guarding  bowel sounds are positive and no caput medusa noted.  She is most tender in the epigastric area but has a positive alejandra sign.      Skin was warm and pink  Normal Affect      Lower extremity edema is not present.        Assessment: patient with epigastric pain and positive alejandra sign.      Plan to do Carafate  For the Carafate take 1 pill and drop in glass of water and allow to dissolve for 30 minutes and then drink it. Do this every 6 hours.  Do for 1 month and if start having symptoms again on the other medications for reducing acid then can start it again.  If still having problems follow up with me or your regular doctor.  This one may affect absorption of other medications.  Talk to your pharmacy about this.  I can order the liquid form but most insurance does not cover it and this is a lot less expensive.   Will get ultrasound of the gallbladder   Protonix to stop acid release in the stomach and will get an upper endoscopy. .  And pregnancy test.  Time spent with the patient " with greater that 50% of the time in discussion was 30 minutes.  In discussing the symptom and plan.   .     service did not work and patient is ok with her english.   Bobo Dia MD          Again, thank you for allowing me to participate in the care of your patient.        Sincerely,        Bobo Dia MD

## 2020-10-27 NOTE — PATIENT INSTRUCTIONS
Assessment: patient with epigastric pain and positive alejandra sign.      Plan to do Carafate  For the Carafate take 1 pill and drop in glass of water and allow to dissolve for 30 minutes and then drink it. Do this every 6 hours.  Do for 1 month and if start having symptoms again on the other medications for reducing acid then can start it again.  If still having problems follow up with me or your regular doctor.  This one may affect absorption of other medications.  Talk to your pharmacy about this.  I can order the liquid form but most insurance does not cover it and this is a lot less expensive.   Will get ultrasound of the gallbladder   Protonix to stop acid release in the stomach and will get an upper endoscopy. .  And pregnancy test.

## 2020-10-27 NOTE — TELEPHONE ENCOUNTER
We have an allergy on file for PPI's for this patient.  Please send alternate medication or call pharmacy with questions. [phone].    Thank You  Shahana Poon, [facility]  [phone]

## 2020-10-29 ENCOUNTER — ANCILLARY PROCEDURE (OUTPATIENT)
Dept: ULTRASOUND IMAGING | Facility: CLINIC | Age: 40
End: 2020-10-29
Attending: SURGERY
Payer: COMMERCIAL

## 2020-10-29 DIAGNOSIS — R10.13 EPIGASTRIC PAIN: ICD-10-CM

## 2020-10-29 PROCEDURE — 76700 US EXAM ABDOM COMPLETE: CPT | Performed by: RADIOLOGY

## 2020-11-01 ENCOUNTER — APPOINTMENT (OUTPATIENT)
Dept: INTERPRETER SERVICES | Facility: CLINIC | Age: 40
End: 2020-11-01
Payer: COMMERCIAL

## 2020-11-03 DIAGNOSIS — R10.13 EPIGASTRIC PAIN: ICD-10-CM

## 2020-11-03 PROCEDURE — U0003 INFECTIOUS AGENT DETECTION BY NUCLEIC ACID (DNA OR RNA); SEVERE ACUTE RESPIRATORY SYNDROME CORONAVIRUS 2 (SARS-COV-2) (CORONAVIRUS DISEASE [COVID-19]), AMPLIFIED PROBE TECHNIQUE, MAKING USE OF HIGH THROUGHPUT TECHNOLOGIES AS DESCRIBED BY CMS-2020-01-R: HCPCS | Performed by: SURGERY

## 2020-11-04 ENCOUNTER — TELEPHONE (OUTPATIENT)
Dept: FAMILY MEDICINE | Facility: CLINIC | Age: 40
End: 2020-11-04

## 2020-11-04 LAB
LABORATORY COMMENT REPORT: NORMAL
SARS-COV-2 RNA SPEC QL NAA+PROBE: NEGATIVE
SARS-COV-2 RNA SPEC QL NAA+PROBE: NORMAL
SPECIMEN SOURCE: NORMAL
SPECIMEN SOURCE: NORMAL

## 2020-11-04 NOTE — TELEPHONE ENCOUNTER
Reason for call:  Results   Name of test or procedure: COVID TEST  Date of test or procedure: N/A   Location of test or procedure:LAB    Additional comments: N/A    Phone number to reach patient:  Home number on file [phone] (home)    Best Time:  ANYTIME    Can we leave a detailed message on this number?  YES    Travel screening: Not Applicable

## 2020-11-05 DIAGNOSIS — Z11.59 ENCOUNTER FOR SCREENING FOR OTHER VIRAL DISEASES: Primary | ICD-10-CM

## 2020-11-16 DIAGNOSIS — Z11.59 ENCOUNTER FOR SCREENING FOR OTHER VIRAL DISEASES: ICD-10-CM

## 2020-11-16 PROCEDURE — U0003 INFECTIOUS AGENT DETECTION BY NUCLEIC ACID (DNA OR RNA); SEVERE ACUTE RESPIRATORY SYNDROME CORONAVIRUS 2 (SARS-COV-2) (CORONAVIRUS DISEASE [COVID-19]), AMPLIFIED PROBE TECHNIQUE, MAKING USE OF HIGH THROUGHPUT TECHNOLOGIES AS DESCRIBED BY CMS-2020-01-R: HCPCS | Performed by: SURGERY

## 2020-11-17 ENCOUNTER — OFFICE VISIT (OUTPATIENT)
Dept: FAMILY MEDICINE | Facility: CLINIC | Age: 40
End: 2020-11-17
Payer: COMMERCIAL

## 2020-11-17 VITALS
SYSTOLIC BLOOD PRESSURE: 120 MMHG | BODY MASS INDEX: 24.47 KG/M2 | OXYGEN SATURATION: 100 % | TEMPERATURE: 98.2 F | HEART RATE: 67 BPM | DIASTOLIC BLOOD PRESSURE: 83 MMHG | WEIGHT: 151.6 LBS

## 2020-11-17 DIAGNOSIS — Z30.46 SURVEILLANCE OF PREVIOUSLY PRESCRIBED IMPLANTABLE SUBDERMAL CONTRACEPTIVE: Primary | ICD-10-CM

## 2020-11-17 DIAGNOSIS — N91.2 AMENORRHEA: ICD-10-CM

## 2020-11-17 LAB
FSH SERPL-ACNC: 11.7 IU/L
SARS-COV-2 RNA SPEC QL NAA+PROBE: NOT DETECTED
SPECIMEN SOURCE: NORMAL
TSH SERPL DL<=0.005 MIU/L-ACNC: 2.75 MU/L (ref 0.4–4)

## 2020-11-17 PROCEDURE — 83001 ASSAY OF GONADOTROPIN (FSH): CPT | Performed by: FAMILY MEDICINE

## 2020-11-17 PROCEDURE — 11982 REMOVE DRUG IMPLANT DEVICE: CPT | Performed by: FAMILY MEDICINE

## 2020-11-17 PROCEDURE — 84443 ASSAY THYROID STIM HORMONE: CPT | Performed by: FAMILY MEDICINE

## 2020-11-17 PROCEDURE — 36415 COLL VENOUS BLD VENIPUNCTURE: CPT | Performed by: FAMILY MEDICINE

## 2020-11-17 NOTE — PROGRESS NOTES
Procedure Note-Nexplanon Removal    Julio Cesar Cha is a patient of Dr. Vegas, [facility] here for removal of etonogestrel implant Nexplanon/Implanon    Indication: wanting her Nexplanon removed due to headaches, irritability, poor sleep and lack of menses. Plans to wait for her Depo till her menses start.  in [place]    Consent: Affirmation of informed consent was signed and scanned into the medical record. Risks, benefits and alternatives were discussed. Patient's questions were elicited and answered.   Procedure safety checklist was completed:  Yes  Time Out (Pause for the Cause) completed: Yes      Preoperative Diagnosis: etonogestrel implant  Postoperative Diagnosis: etonogestrel implant removed    Technique: On the left arm  Skin prep Betadine  Anesthesia 1% lidocaine  Procedure: Small incision (<5mm) was made at distal end of palpable implant, curved hemostat was used to isolate the implant and bring it to the incision, the fibrous capsule containing the implant  was incised and the Implant was removed intact.  EBL: minimal  Complications:  No  Tolerance:  Pt tolerated procedure well and was in stable condition.     Contraception was discussed and patient chose the following method none      Follow up: Pt was instructed to call if bleeding, severe pain or foul smell.     As needed       Resident: Mahogany Basilio MD  Faculty: Mahogany Basilio MD present for and supervised this entire procedure.

## 2020-11-17 NOTE — PATIENT INSTRUCTIONS
NEXPLANON AFTERCARE INSTRUCTIONS     You may have some pain at the site of the Nexplanon removal site. You can help relieve the discomfort with Tylenol (acetaminophen), Aspirin or Advil (ibuprofen). If your discomfort worsens or you notice redness spreading on the skin around the insertion site, please call the clinic.       Irregular bleeding is common after removal of the Nexplanon for the first month or so of use.       Keep the bandage on for 24 hours. You can loosen the band if it is too tight, your arm starts to hurt or your hands feel cool, tingling or numb.  After 24 hours, keep the area clean and dry and cover with a bandaid for protection. The steri strips will fall off in 7 - 10 days on their own.     Warning Signs   Call the clinic if any of the following occurs:     You have bleeding, pus, or increasing redness, or pain at insertion site.     You have fever or chills   Scheduling:  If you have any concerns about today's visit or wish to schedule another appointment please call our office during normal business hours [phone] (8-5:00 M-F)  If a referral was made to a [facility] and you don't get a call from central scheduling please call [phone].  If a Mammogram was ordered for you at [facility] call [phone] to schedule or change your appointment.  If you had an XRay/CT/Ultrasound/MRI ordered the number is [phone] to schedule or change your radiology appointment.

## 2020-11-17 NOTE — NURSING NOTE
Due to patient being non-English speaking/uses sign language, an  was used for this visit. Only for face-to-face interpretation by an external agency, date and length of interpretation can be found on the scanned worksheet.     name: Honey Arce  Agency: [employer]  Language: Monegasque   Telephone number: [phone]  Type of interpretation: Face-to-face, spoken

## 2020-11-19 ENCOUNTER — HOSPITAL ENCOUNTER (OUTPATIENT)
Facility: AMBULATORY SURGERY CENTER | Age: 40
Discharge: HOME | End: 2020-11-19
Attending: SURGERY | Admitting: SURGERY
Payer: COMMERCIAL

## 2020-11-19 VITALS
RESPIRATION RATE: 16 BRPM | OXYGEN SATURATION: 100 % | HEART RATE: 87 BPM | SYSTOLIC BLOOD PRESSURE: 125 MMHG | TEMPERATURE: 97.8 F | DIASTOLIC BLOOD PRESSURE: 81 MMHG

## 2020-11-19 LAB — UPPER GI ENDOSCOPY: NORMAL

## 2020-11-19 PROCEDURE — 43239 EGD BIOPSY SINGLE/MULTIPLE: CPT

## 2020-11-19 PROCEDURE — 88305 TISSUE EXAM BY PATHOLOGIST: CPT | Performed by: PATHOLOGY

## 2020-11-19 PROCEDURE — G8907 PT DOC NO EVENTS ON DISCHARG: HCPCS

## 2020-11-19 PROCEDURE — G8918 PT W/O PREOP ORDER IV AB PRO: HCPCS

## 2020-11-19 PROCEDURE — 43239 EGD BIOPSY SINGLE/MULTIPLE: CPT | Performed by: SURGERY

## 2020-11-19 RX ORDER — ONDANSETRON 4 MG/1
4 TABLET, ORALLY DISINTEGRATING ORAL EVERY 6 HOURS PRN
Status: DISCONTINUED | OUTPATIENT
Start: 2020-11-19 | End: 2020-11-20 | Stop reason: HOSPADM

## 2020-11-19 RX ORDER — FLUMAZENIL 0.1 MG/ML
0.2 INJECTION, SOLUTION INTRAVENOUS
Status: DISCONTINUED | OUTPATIENT
Start: 2020-11-19 | End: 2020-11-20 | Stop reason: HOSPADM

## 2020-11-19 RX ORDER — LIDOCAINE 40 MG/G
CREAM TOPICAL
Status: DISCONTINUED | OUTPATIENT
Start: 2020-11-19 | End: 2020-11-20 | Stop reason: HOSPADM

## 2020-11-19 RX ORDER — PROCHLORPERAZINE MALEATE 10 MG
10 TABLET ORAL EVERY 6 HOURS PRN
Status: DISCONTINUED | OUTPATIENT
Start: 2020-11-19 | End: 2020-11-20 | Stop reason: HOSPADM

## 2020-11-19 RX ORDER — NALOXONE HYDROCHLORIDE 0.4 MG/ML
.1-.4 INJECTION, SOLUTION INTRAMUSCULAR; INTRAVENOUS; SUBCUTANEOUS
Status: DISCONTINUED | OUTPATIENT
Start: 2020-11-19 | End: 2020-11-20 | Stop reason: HOSPADM

## 2020-11-19 RX ORDER — ONDANSETRON 2 MG/ML
4 INJECTION INTRAMUSCULAR; INTRAVENOUS EVERY 6 HOURS PRN
Status: DISCONTINUED | OUTPATIENT
Start: 2020-11-19 | End: 2020-11-20 | Stop reason: HOSPADM

## 2020-11-19 RX ORDER — FENTANYL CITRATE 50 UG/ML
INJECTION, SOLUTION INTRAMUSCULAR; INTRAVENOUS PRN
Status: DISCONTINUED | OUTPATIENT
Start: 2020-11-19 | End: 2020-11-19 | Stop reason: HOSPADM

## 2020-11-23 LAB — COPATH REPORT: NORMAL

## 2020-11-24 ENCOUNTER — TELEPHONE (OUTPATIENT)
Dept: FAMILY MEDICINE | Facility: CLINIC | Age: 40
End: 2020-11-24

## 2020-11-24 NOTE — TELEPHONE ENCOUNTER
requested that I contact patient and confirm with her that we are her PCC. [facility] is listed as PCC, although patient comes to [facility] [place] [facility] for care. Attempted to reach patient with Language Line ID # [id number], no answer. Will try again.    Nadia Forde  Care Coordinator

## 2020-11-30 NOTE — TELEPHONE ENCOUNTER
11/30/20 Attempted once again to reach patient, unsuccessful. Sending letter to patient home.    Nadia Forde  Care Coordinator

## 2021-01-21 ENCOUNTER — OFFICE VISIT (OUTPATIENT)
Dept: FAMILY MEDICINE | Facility: CLINIC | Age: 41
End: 2021-01-21
Payer: COMMERCIAL

## 2021-01-21 VITALS
HEIGHT: 66 IN | DIASTOLIC BLOOD PRESSURE: 74 MMHG | HEART RATE: 91 BPM | OXYGEN SATURATION: 97 % | SYSTOLIC BLOOD PRESSURE: 111 MMHG | BODY MASS INDEX: 22.51 KG/M2 | WEIGHT: 140.03 LBS | TEMPERATURE: 98 F

## 2021-01-21 DIAGNOSIS — R10.13 EPIGASTRIC PAIN: Primary | ICD-10-CM

## 2021-01-21 LAB
ALBUMIN SERPL-MCNC: 3.5 G/DL (ref 3.4–5)
ALP SERPL-CCNC: 72 U/L (ref 40–150)
ALT SERPL W P-5'-P-CCNC: 20 U/L (ref 0–50)
ANION GAP SERPL CALCULATED.3IONS-SCNC: 5 MMOL/L (ref 3–14)
AST SERPL W P-5'-P-CCNC: 12 U/L (ref 0–45)
BILIRUB SERPL-MCNC: 0.3 MG/DL (ref 0.2–1.3)
BUN SERPL-MCNC: 4 MG/DL (ref 7–30)
CALCIUM SERPL-MCNC: 8.9 MG/DL (ref 8.5–10.1)
CHLORIDE SERPL-SCNC: 104 MMOL/L (ref 94–109)
CO2 SERPL-SCNC: 30 MMOL/L (ref 20–32)
CREAT SERPL-MCNC: 0.37 MG/DL (ref 0.52–1.04)
ERYTHROCYTE [DISTWIDTH] IN BLOOD BY AUTOMATED COUNT: 13.4 % (ref 10–15)
GFR SERPL CREATININE-BSD FRML MDRD: >90 ML/MIN/{1.73_M2}
GLUCOSE SERPL-MCNC: 145 MG/DL (ref 70–99)
HCT VFR BLD AUTO: 37.7 % (ref 35–47)
HGB BLD-MCNC: 12.3 G/DL (ref 11.7–15.7)
MCH RBC QN AUTO: 28.3 PG (ref 26.5–33)
MCHC RBC AUTO-ENTMCNC: 32.6 G/DL (ref 31.5–36.5)
MCV RBC AUTO: 87 FL (ref 78–100)
PLATELET # BLD AUTO: 305 10E9/L (ref 150–450)
POTASSIUM SERPL-SCNC: 3.8 MMOL/L (ref 3.4–5.3)
PROT SERPL-MCNC: 7.3 G/DL (ref 6.8–8.8)
RBC # BLD AUTO: 4.34 10E12/L (ref 3.8–5.2)
SODIUM SERPL-SCNC: 139 MMOL/L (ref 133–144)
WBC # BLD AUTO: 6 10E9/L (ref 4–11)

## 2021-01-21 PROCEDURE — 85027 COMPLETE CBC AUTOMATED: CPT | Performed by: PHYSICIAN ASSISTANT

## 2021-01-21 PROCEDURE — 36415 COLL VENOUS BLD VENIPUNCTURE: CPT | Performed by: PHYSICIAN ASSISTANT

## 2021-01-21 PROCEDURE — 99214 OFFICE O/P EST MOD 30 MIN: CPT | Performed by: PHYSICIAN ASSISTANT

## 2021-01-21 PROCEDURE — 80053 COMPREHEN METABOLIC PANEL: CPT | Performed by: PHYSICIAN ASSISTANT

## 2021-01-21 ASSESSMENT — MIFFLIN-ST. JEOR: SCORE: 1306.93

## 2021-01-21 ASSESSMENT — PAIN SCALES - GENERAL: PAINLEVEL: EXTREME PAIN (8)

## 2021-01-21 NOTE — PROGRESS NOTES
"  Assessment & Plan     Epigastric pain  Will evaluate labs today. Suggest we rule out H Pylori as it appears this wasn't done in the upper endoscopy? (Or at least the patient doesn't recall any discussion about it). Patient agrees with plan. She has no further questions today. Return in 2 weeks (or about one week after the H Pylori test is brought to clinic).   - Helicobacter pylori Antigen Stool; Future  - CBC with platelets  - Comprehensive metabolic panel (BMP + Alb, Alk Phos, ALT, AST, Total. Bili, TP)                             Return in about 2 weeks (around 2/4/2021).    [facility] KLAUS Harrell is a 41 year old who presents to clinic today for the following health issues     HPI       Abdominal/Flank Pain  Onset/Duration: Septmeber  Description:   Character: Dull ache  Location: Whole stomach  Radiation: None  Intensity: 8/10   Progression of Symptoms:  same  Accompanying Signs & Symptoms:  Fever/Chills: no  Gas/Bloating: no  Nausea: YES  Vomitting: no  Diarrhea: no  Constipation: no  Dysuria or Hematuria: no  History:   Trauma: no  Previous similar pain: no  Previous tests done: Upper Endoscopy  Precipitating factors:   Does the pain change with:     Food: YES    Bowel Movement: YES    Urination: no   Other factors:  no  Therapies tried and outcome: Tylenol, Pepcid, Protonix- helped at the beginning and not anymore.    No medications seem to be working. Had an upper endoscopy in Nov 2020 - was told to take protonix which doesn't seem to be helping. She hasnt taken it in a few days.    Review of Systems   Constitutional, HEENT, cardiovascular, pulmonary, gi and gu systems are negative, except as otherwise noted.      Objective    /74 (BP Location: Right arm, Patient Position: Chair, Cuff Size: Adult Large)   Pulse 91   Temp 98  F (36.7  C) (Oral)   Ht 1.676 m (5' 6\")   Wt 63.5 kg (140 lb 0.5 oz)   LMP 01/11/2021 (Approximate)   SpO2 97%   " BMI 22.60 kg/m    Body mass index is 22.6 kg/m .  Physical Exam   GENERAL: healthy, alert and no distress  NECK: no adenopathy, no asymmetry, masses, or scars and thyroid normal to palpation  RESP: lungs clear to auscultation - no rales, rhonchi or wheezes  CV: regular rate and rhythm, normal S1 S2, no S3 or S4, no murmur, click or rub, no peripheral edema and peripheral pulses strong  ABDOMEN: soft, nontender, no hepatosplenomegaly, no masses and bowel sounds normal  MS: no gross musculoskeletal defects noted, no edema

## 2021-01-21 NOTE — LETTER
January 22, 2021      Julio Cesar Cha  [address] [address] [address] [address]        Dear ,    We are writing to inform you of your test results.    No concerning findings with your labs.       Resulted Orders   CBC with platelets   Result Value Ref Range    WBC 6.0 4.0 - 11.0 10e9/L    RBC Count 4.34 3.8 - 5.2 10e12/L    Hemoglobin 12.3 11.7 - 15.7 g/dL    Hematocrit 37.7 35.0 - 47.0 %    MCV 87 78 - 100 fl    MCH 28.3 26.5 - 33.0 pg    MCHC 32.6 31.5 - 36.5 g/dL    RDW 13.4 10.0 - 15.0 %    Platelet Count 305 150 - 450 10e9/L   Comprehensive metabolic panel (BMP + Alb, Alk Phos, ALT, AST, Total. Bili, TP)   Result Value Ref Range    Sodium 139 133 - 144 mmol/L    Potassium 3.8 3.4 - 5.3 mmol/L    Chloride 104 94 - 109 mmol/L    Carbon Dioxide 30 20 - 32 mmol/L    Anion Gap 5 3 - 14 mmol/L    Glucose 145 (H) 70 - 99 mg/dL      Comment:      Non Fasting    Urea Nitrogen 4 (L) 7 - 30 mg/dL    Creatinine 0.37 (L) 0.52 - 1.04 mg/dL    GFR Estimate >90 >60 mL/min/[1.73_m2]      Comment:      Non  GFR Calc  Starting 12/18/2018, serum creatinine based estimated GFR (eGFR) will be   calculated using the Chronic Kidney Disease Epidemiology Collaboration   (CKD-EPI) equation.      GFR Estimate If Black >90 >60 mL/min/[1.73_m2]      Comment:       GFR Calc  Starting 12/18/2018, serum creatinine based estimated GFR (eGFR) will be   calculated using the Chronic Kidney Disease Epidemiology Collaboration   (CKD-EPI) equation.      Calcium 8.9 8.5 - 10.1 mg/dL    Bilirubin Total 0.3 0.2 - 1.3 mg/dL    Albumin 3.5 3.4 - 5.0 g/dL    Protein Total 7.3 6.8 - 8.8 g/dL    Alkaline Phosphatase 72 40 - 150 U/L    ALT 20 0 - 50 U/L    AST 12 0 - 45 U/L       If you have any questions or concerns, please call the clinic at the number listed above.       Sincerely,      Renata Huynh PA-C/bay

## 2021-01-28 DIAGNOSIS — R10.13 EPIGASTRIC PAIN: ICD-10-CM

## 2021-01-28 PROCEDURE — 87338 HPYLORI STOOL AG IA: CPT | Performed by: PHYSICIAN ASSISTANT

## 2021-01-29 LAB — H PYLORI AG STL QL IA: POSITIVE

## 2021-02-01 ENCOUNTER — TELEPHONE (OUTPATIENT)
Dept: FAMILY MEDICINE | Facility: CLINIC | Age: 41
End: 2021-02-01

## 2021-02-01 NOTE — TELEPHONE ENCOUNTER
Called patient and left voice mail to call clinic and schedule an appointment to go over labs. If she calls back please help schedule.    Claudia Soria MA

## 2021-02-04 ENCOUNTER — OFFICE VISIT (OUTPATIENT)
Dept: FAMILY MEDICINE | Facility: CLINIC | Age: 41
End: 2021-02-04
Payer: COMMERCIAL

## 2021-02-04 ENCOUNTER — TELEPHONE (OUTPATIENT)
Dept: FAMILY MEDICINE | Facility: CLINIC | Age: 41
End: 2021-02-04

## 2021-02-04 VITALS
WEIGHT: 137 LBS | DIASTOLIC BLOOD PRESSURE: 75 MMHG | SYSTOLIC BLOOD PRESSURE: 117 MMHG | TEMPERATURE: 98.2 F | BODY MASS INDEX: 22.11 KG/M2 | OXYGEN SATURATION: 100 % | HEART RATE: 86 BPM

## 2021-02-04 DIAGNOSIS — A04.8 H. PYLORI INFECTION: Primary | ICD-10-CM

## 2021-02-04 DIAGNOSIS — R10.13 EPIGASTRIC PAIN: ICD-10-CM

## 2021-02-04 PROCEDURE — 99214 OFFICE O/P EST MOD 30 MIN: CPT | Performed by: PHYSICIAN ASSISTANT

## 2021-02-04 RX ORDER — PANTOPRAZOLE SODIUM 40 MG/1
40 TABLET, DELAYED RELEASE ORAL DAILY
Qty: 14 TABLET | Refills: 0 | Status: SHIPPED | OUTPATIENT
Start: 2021-02-04 | End: 2021-02-04

## 2021-02-04 RX ORDER — PANTOPRAZOLE SODIUM 40 MG/1
40 TABLET, DELAYED RELEASE ORAL 2 TIMES DAILY
Qty: 28 TABLET | Refills: 0 | Status: SHIPPED | OUTPATIENT
Start: 2021-02-04 | End: 2021-02-18

## 2021-02-04 RX ORDER — AMOXICILLIN 500 MG/1
1000 CAPSULE ORAL 2 TIMES DAILY
Qty: 56 CAPSULE | Refills: 0 | Status: SHIPPED | OUTPATIENT
Start: 2021-02-04 | End: 2021-02-18

## 2021-02-04 RX ORDER — ESOMEPRAZOLE MAGNESIUM 40 MG/1
40 CAPSULE, DELAYED RELEASE ORAL
Qty: 14 CAPSULE | Refills: 0 | Status: CANCELLED | OUTPATIENT
Start: 2021-02-04 | End: 2021-02-18

## 2021-02-04 RX ORDER — CLARITHROMYCIN 500 MG
500 TABLET ORAL 2 TIMES DAILY
Qty: 28 TABLET | Refills: 0 | Status: SHIPPED | OUTPATIENT
Start: 2021-02-04 | End: 2021-02-18

## 2021-02-04 ASSESSMENT — PAIN SCALES - GENERAL: PAINLEVEL: MODERATE PAIN (5)

## 2021-02-04 NOTE — TELEPHONE ENCOUNTER
Pharmacy calling regarding pantoprazole prescription. He stated it is typically 1 tablet BID for H pylori.     Routed to provider to review.     Thank you,   Millie Cervantes RN

## 2021-02-04 NOTE — PROGRESS NOTES
Assessment & Plan     H. pylori infection  Epigastric pain  Positive H Pylori test. Will treat with triple therapy. Should retest in one month for eradication. Patient agrees with plan. I discussed with the patient risks and benefits of the new medication prescribed including potential side effects.  The patient had opportunity to ask questions and is comfortable with and interested in medications as prescribed.  - clarithromycin (BIAXIN) 500 MG tablet; Take 1 tablet (500 mg) by mouth 2 times daily for 14 days  - amoxicillin (AMOXIL) 500 MG capsule; Take 2 capsules (1,000 mg) by mouth 2 times daily for 14 days  - pantoprazole (PROTONIX) 40 MG EC tablet; Take 1 tablet (40 mg) by mouth daily for 14 days      Return in about 4 weeks (around 3/4/2021).    Renata Huynh PA-C  [facility] KLAUS Harrell is a 41 year old who presents to clinic today for the following health issues     HPI       Patient is here to follow up on lab results. She has no change in symptoms. Continues to have abdominal pain and bloating.         Review of Systems   Constitutional, HEENT, cardiovascular, pulmonary, gi and gu systems are negative, except as otherwise noted.      Objective    /75 (BP Location: Right arm, Patient Position: Chair, Cuff Size: Adult Large)   Pulse 86   Temp 98.2  F (36.8  C) (Oral)   Wt 62.1 kg (137 lb)   LMP 01/11/2021 (Approximate)   SpO2 100%   BMI 22.11 kg/m    Body mass index is 22.11 kg/m .  Physical Exam   GENERAL: healthy, alert and no distress  NECK: no adenopathy, no asymmetry, masses, or scars and thyroid normal to palpation  RESP: lungs clear to auscultation - no rales, rhonchi or wheezes  CV: regular rate and rhythm, normal S1 S2, no S3 or S4, no murmur, click or rub, no peripheral edema and peripheral pulses strong  ABDOMEN: soft, nontender, no hepatosplenomegaly, no masses and bowel sounds normal  MS: no gross musculoskeletal defects noted, no  edema

## 2021-02-25 RX ORDER — OXYCODONE AND ACETAMINOPHEN 5; 325 MG/1; MG/1
1-2 TABLET ORAL EVERY 6 HOURS
COMMUNITY
Start: 2021-02-23 | End: 2021-02-26

## 2021-02-25 NOTE — PROGRESS NOTES
Assessment & Plan     Hospital discharge follow-up    Pancreatic adenocarcinoma (H)  Patient given refill on pain medication.   Needs appointment with Marybeth Carpenter- I can see per Care Everywhere that they are reviewing her chart prior to contacting her for appointment, advised patient and  if they do not hear from them by early next week to call.   - oxyCODONE-acetaminophen (PERCOCET) 5-325 MG tablet; Take 1-2 tablets by mouth every 6 hours    Nausea and vomiting, intractability of vomiting not specified, unspecified vomiting type  Trial of zofran for nausea and vomiting.  Instructed on adequate hydration, drinking Gatorade. Start with liquids and slowly advance as tolerated.   Advised if nausea/vomiting does not improve she needs to be seen in the ER.   - ondansetron (ZOFRAN-ODT) 4 MG ODT tab; Take 1 tablet (4 mg) by mouth every 8 hours as needed for nausea    Abdominal pain, generalized  - oxyCODONE-acetaminophen (PERCOCET) 5-325 MG tablet; Take 1-2 tablets by mouth every 6 hours    Review of prior external note(s) from - CareEverywhere information from [facility] reviewed         See Patient Instructions  Patient Instructions   Start medicine for nausea.  Ondansetron (Zofran) 1 pill every 8 hours as needed for nausea and vomiting.     Make sure you are getting enough fluids.  Buy some gatorade.  If you are tolerating fluids, can gradually increase to soft bland foods.     If you are becoming weak, continuing to vomiting or making little urine you need to be seen in the ER.       Can continue pain pill (oxycodone-acetaminophen/Percocet) 1-2 pills every 6 hours as needed.    Try taking before bed to help you sleep.       [facility] should be contacting you for an appointment.  If you do not hear anything by Monday, please call there.         Return in about 2 days (around 2/28/2021) for ER, If failure to improve, or sooner if worsening.    Teri Resendez, [facility]  SERGE Garcia   Julio Cesar is a 41 year old who presents for the following health issues  accompanied by her spouse:    [facility]         Hospital Follow-up Visit:    Hospital/Nursing Home/IP Rehab Facility: [facility]  Date of Admission: 2/20/2021  Date of Discharge: 2/23/2021  Reason(s) for Admission: abdominal pain, vomiting, diagnosed with pancreatic adenocarcinoma      Was your hospitalization related to COVID-19? No   Problems taking medications regularly:  None  Medication changes since discharge: None  Problems adhering to non-medication therapy:  None    Summary of hospitalization:  CareEverywhere information obtained and reviewed  Hospital Course     Julio Cesar Cha is a 41 y.o. female who presents to the emergency department for evaluation of acute on chronic upper abdominal pain, which has been present for 3+ months but worse recently, with vomiting, and 25lb weight loss since November. No fevers or chills. She has been followed by GI, has had unremarkable EGD, gastritis. Was H. Pylori positive but unable to tolerate the course of antibiotics. pancreatic mass seen on CT imaging    Pancreatic mass     Pancreatic mass, acute liver injury      -  US showing intrahepatic and extrahepatic biliary dilatation extending down to the level of the pancreatic head where there is a heterogeneously hypoechoic mass in the inferior aspect of the pancreatic head measuring 3.1 x 4.2 x 3.3 cm.      -  LFTs elevated, ALT//317, total bili 8.8, alk phos 430, lipase normal      -ERCP with biopsy 2/22/21  - FNA:  Final Diagnosis   A) PANCREAS MASS, HEAD, EUS GUIDED FINE NEEDLE ASPIRATION FOLLOWED BY CORE BIOPSY: Positive for malignancy; adenocarcinoma, compatible with pancreatic primary     - CA 19-16k  - Patient [facility]     Recommendations for Outpatient Provider   PCP: [facility]     Recommendations for outpatient provider   Specific recommendations to be addressed at the follow up  visit:  Pancreatic mass.  - ERCP completed with biopsy and plastic stent placed.  - GI to contact patient with path results.      Diagnostic Tests/Treatments reviewed.  Follow up needed: Needs appointment with [facility]  Other Healthcare Providers Involved in Patient s Care:         None  Update since discharge: unchanged    Patient reports continued nausea, vomiting and abdominal pain, worse with eating.  She hasn't been prescribed any medication for nausea. She was given 10 Percocet at the time of hospital discharge, she has take approximately 2 pills so far.  She thinks that the medication helps some, denies any worsening of nausea with taking medication.  They have not been contacted for oncology appointment yet.  No fevers, chills.  She states she is urinating normal amounts.       Post Discharge Medication Reconciliation: discharge medications reconciled and changed, per note/orders.  Plan of care communicated with patient and family                Review of Systems   Constitutional, HEENT, cardiovascular, pulmonary, gi and gu systems are negative, except as otherwise noted.      Objective    /85   Pulse 100   Temp 99.2  F (37.3  C)   Wt 57.6 kg (127 lb)   SpO2 99%   BMI 20.50 kg/m    Body mass index is 20.5 kg/m .  Physical Exam   GENERAL: healthy, alert and mild distress, appears tired  EYES: Eyes grossly normal to inspection, PERRL and conjunctivae and sclerae normal  HENT: ear canals and TM's normal, nose and mouth without ulcers or lesions  NECK: no adenopathy, no asymmetry, masses, or scars and thyroid normal to palpation  RESP: lungs clear to auscultation - no rales, rhonchi or wheezes  CV: regular rate and rhythm, normal S1 S2, no S3 or S4, no murmur, click or rub, no peripheral edema and peripheral pulses strong  ABDOMEN: soft, nontender, no hepatosplenomegaly, no masses and bowel sounds normal  MS: no gross musculoskeletal defects noted, no edema  SKIN: no suspicious  lesions or rashes.  Good skin turgor.   NEURO: Normal strength and tone, mentation intact and speech normal  PSYCH: mentation appears normal, affect normal/bright    Labs reviewed in Care Everywhere

## 2021-02-26 ENCOUNTER — OFFICE VISIT (OUTPATIENT)
Dept: FAMILY MEDICINE | Facility: CLINIC | Age: 41
End: 2021-02-26
Payer: COMMERCIAL

## 2021-02-26 VITALS
BODY MASS INDEX: 20.5 KG/M2 | OXYGEN SATURATION: 99 % | WEIGHT: 127 LBS | HEART RATE: 100 BPM | DIASTOLIC BLOOD PRESSURE: 85 MMHG | TEMPERATURE: 99.2 F | SYSTOLIC BLOOD PRESSURE: 120 MMHG

## 2021-02-26 DIAGNOSIS — R11.2 NAUSEA AND VOMITING, INTRACTABILITY OF VOMITING NOT SPECIFIED, UNSPECIFIED VOMITING TYPE: ICD-10-CM

## 2021-02-26 DIAGNOSIS — C25.9 PANCREATIC ADENOCARCINOMA (H): ICD-10-CM

## 2021-02-26 DIAGNOSIS — R10.84 ABDOMINAL PAIN, GENERALIZED: ICD-10-CM

## 2021-02-26 DIAGNOSIS — Z09 HOSPITAL DISCHARGE FOLLOW-UP: Primary | ICD-10-CM

## 2021-02-26 PROBLEM — K86.89 PANCREATIC MASS: Status: ACTIVE | Noted: 2021-02-20

## 2021-02-26 PROCEDURE — 99214 OFFICE O/P EST MOD 30 MIN: CPT | Performed by: NURSE PRACTITIONER

## 2021-02-26 RX ORDER — OXYCODONE AND ACETAMINOPHEN 5; 325 MG/1; MG/1
1-2 TABLET ORAL EVERY 6 HOURS
Qty: 30 TABLET | Refills: 0 | Status: SHIPPED | OUTPATIENT
Start: 2021-02-26

## 2021-02-26 RX ORDER — ONDANSETRON 4 MG/1
4 TABLET, ORALLY DISINTEGRATING ORAL EVERY 8 HOURS PRN
Qty: 30 TABLET | Refills: 0 | Status: SHIPPED | OUTPATIENT
Start: 2021-02-26

## 2021-02-26 NOTE — PATIENT INSTRUCTIONS
Start medicine for nausea.  Ondansetron (Zofran) 1 pill every 8 hours as needed for nausea and vomiting.     Make sure you are getting enough fluids.  Buy some gatorade.  If you are tolerating fluids, can gradually increase to soft bland foods.     If you are becoming weak, continuing to vomiting or making little urine you need to be seen in the ER.       Can continue pain pill (oxycodone-acetaminophen/Percocet) 1-2 pills every 6 hours as needed.    Try taking before bed to help you sleep.       Marybeth B[facility] should be contacting you for an appointment.  If you do not hear anything by Monday, please call there.

## 2021-03-12 ENCOUNTER — OFFICE VISIT (OUTPATIENT)
Dept: FAMILY MEDICINE | Facility: CLINIC | Age: 41
End: 2021-03-12
Payer: COMMERCIAL

## 2021-03-12 VITALS
OXYGEN SATURATION: 100 % | SYSTOLIC BLOOD PRESSURE: 113 MMHG | DIASTOLIC BLOOD PRESSURE: 81 MMHG | BODY MASS INDEX: 21.51 KG/M2 | HEART RATE: 105 BPM | TEMPERATURE: 98.8 F | WEIGHT: 126 LBS | HEIGHT: 64 IN

## 2021-03-12 DIAGNOSIS — Z09 HOSPITAL DISCHARGE FOLLOW-UP: Primary | ICD-10-CM

## 2021-03-12 DIAGNOSIS — Z23 NEED FOR HEPATITIS A VACCINATION: ICD-10-CM

## 2021-03-12 DIAGNOSIS — Z23 NEED FOR PNEUMOCOCCAL VACCINATION: ICD-10-CM

## 2021-03-12 DIAGNOSIS — E43 SEVERE PROTEIN-CALORIE MALNUTRITION (H): ICD-10-CM

## 2021-03-12 DIAGNOSIS — C25.9 PANCREATIC ADENOCARCINOMA (H): Chronic | ICD-10-CM

## 2021-03-12 PROCEDURE — 99214 OFFICE O/P EST MOD 30 MIN: CPT | Mod: 25 | Performed by: FAMILY MEDICINE

## 2021-03-12 PROCEDURE — 90632 HEPA VACCINE ADULT IM: CPT | Performed by: FAMILY MEDICINE

## 2021-03-12 PROCEDURE — 90471 IMMUNIZATION ADMIN: CPT | Performed by: FAMILY MEDICINE

## 2021-03-12 PROCEDURE — 90670 PCV13 VACCINE IM: CPT | Performed by: FAMILY MEDICINE

## 2021-03-12 PROCEDURE — 90472 IMMUNIZATION ADMIN EACH ADD: CPT | Performed by: FAMILY MEDICINE

## 2021-03-12 ASSESSMENT — MIFFLIN-ST. JEOR: SCORE: 1211.53

## 2021-03-12 NOTE — PROGRESS NOTES
"  Hospitalization Follow-up Visit         HPI       Hospital Follow-up Visit:    Hospital:  [facility]   Date of Admission: 3/4/21  Date of Discharge: 3/7/21  Reason(s) for Admission: intractable vomiting and abdominal pain             Problems taking medications regularly:  None       Post Discharge Medication Reconciliation: discharge medications reconciled, continue medications without change.       Problems adhering to non-medication therapy:  None       Medications reviewed by: by myself. Findings include Zofran, Dilaudid, tylenol, dulcolax, senokot, miralax.    Summary of hospitalization:  CareEverywhere information obtained and reviewed  Diagnostic Tests/Treatments reviewed.  Follow up needed: none  Other Healthcare Providers Involved in Patient s Care: none  Update since discharge: stable. Able to tolerate some PO, little portions, throughout the day, still nauseated, vomited only twice since discharge, non-bilious or bloody.   Tolerates milk, water, oatmeal.     Nausea responds well to Zofran  Fatigued all day, so difficulty sleeping at night  Pain well controlled with opioids, taking 2 tablets per day    Plan of care communicated with patient and family        A [employer]  was used for  this visit.             Review of Systems:   CONSTITUTIONAL: no fatigue, no unexpected change in weight  SKIN: no worrisome rashes, no worrisome moles, no worrisome lesions  EYES: no acute vision problems or changes  ENT: no ear problems, no mouth problems, no throat problems  RESP: no significant cough, no shortness of breath  CV: no chest pain, no palpitations, no new or worsening peripheral edema  GI: no nausea, no vomiting, no constipation, no diarrhea            Physical Exam:   /81   Pulse 105   Temp 98.8  F (37.1  C) (Oral)   Ht 1.626 m (5' 4\")   Wt 57.2 kg (126 lb)   LMP 02/01/2021 (Within Days)   SpO2 100%   Breastfeeding No   BMI 21.63 kg/m      Vitals significant for mild tachycardia.     General: " chronically ill appearing.    HEENT: Tacky mucous membranes.   Eyes: Conjunctivae and sclerae are normal. Pupils are symmetric.   Resp: No respiratory distress. Normal breath sounds throughout without rales/wheezing.   CV: tachycardic but RRR. Normal capillary refill.   Neuro: The patient is alert and interactive. Speech appears normal. No gross focal symptoms. Ambulatory.  Psych: blunted affect. Behavior is normal.          Results:   See care everywhere    Assessment and Plan      Julio Cesar was seen today for hospital f/u. Admitted to [place] for intractable nausea and vomiting, dehydration. Having chronic abdominal pain exacerbated by eating. Patient and  both note that they do not know what causes her to have pain, and that they are waiting for results from recent biopsy.   Per chart review, patient with known pancreatic AC. Underwent FNA on 2/22.     Explained that pancreatic disease will cause her to have emesis, nausea, abdominal pain, particularly with eating fatty foods. She has lost about 40kg in one year. Reviewed ways to increase caloric and protein intake, will prescribe Glucerna as patient reports newly-diagnosed DM, and refer to Nutrition for further assessment and recommendations.     Hospitalist team requested that she received all needed vaccinations. Per review, she is immune to hepatitis B, will proceed with hepatitis A and pneumonia vaccines. Return in 6 months for hepatitis A #2     Need for hepatitis A vaccination  -     HEPATITIS A VACCINE ADULT IM    Need for pneumococcal vaccination  -     Pneumococcal vaccine 13 valent PCV13 IM (Prevnar) [42677]    Severe protein-calorie malnutrition (H)  -     Nutritional Supplements (GLUCERNA ADVANCE SHAKE) LIQD; Take 1 Bottle by mouth 3 times daily as needed (use to get some calories and protein into your body)  -     NUTRITION REFERRAL - INTERNAL    E&M code to be billed if TCM cannot be: 24556    Type of decision making: Moderate complexity  (91741)    Options for treatment and follow-up care were reviewed with the patient  Julio Cesar Olmosmoises Cha   engaged in the decision making process and verbalized understanding of the options discussed and agreed with the final plan.      Mena Plummer MD

## 2021-03-16 ENCOUNTER — TELEPHONE (OUTPATIENT)
Dept: FAMILY MEDICINE | Facility: CLINIC | Age: 41
End: 2021-03-16

## 2021-03-18 PROBLEM — Z23 NEED FOR HEPATITIS A VACCINATION: Chronic | Status: ACTIVE | Noted: 2021-03-12

## 2021-03-18 PROBLEM — C25.9 PANCREATIC ADENOCARCINOMA (H): Chronic | Status: ACTIVE | Noted: 2021-02-26

## 2021-03-18 PROBLEM — E43 SEVERE PROTEIN-CALORIE MALNUTRITION (H): Status: ACTIVE | Noted: 2021-03-18

## 2021-03-23 NOTE — TELEPHONE ENCOUNTER
Please call patient to come into clinic to go over lab results. (This was the plan discussed in her appt)    Renata Huynh PA-C     Pt needs a supply to last until her appt in June.

## 2021-03-24 NOTE — PROGRESS NOTES
Preceptor Attestation:   Patient seen, evaluated and discussed with the resident. I have verified the content of the note, which accurately reflects my assessment of the patient and the plan of care.   Supervising Physician:  Kecia Cherry, DO

## 2021-03-29 ENCOUNTER — TELEPHONE (OUTPATIENT)
Dept: ONCOLOGY | Facility: CLINIC | Age: 41
End: 2021-03-29

## 2021-03-29 RX ORDER — OXYCODONE AND ACETAMINOPHEN 5; 325 MG/1; MG/1
TABLET ORAL
COMMUNITY
Start: 2021-02-26

## 2021-03-29 NOTE — TELEPHONE ENCOUNTER
Nutrition Services:    Called Julio Cesar today via [employer]  for scheduled MNT visit for pancreatic cancer, referred by Demetria Plummer MD - E43 (ICD-10-CM) - Severe protein-calorie malnutrition (H).     left VM for Julio Cesar to call scheduling to r/s her appointment with RD as desired.  [facility] scheduling information provided.     Melita Rae RD, [facility]  [phone]

## 2021-03-30 ENCOUNTER — OFFICE VISIT (OUTPATIENT)
Dept: FAMILY MEDICINE | Facility: CLINIC | Age: 41
End: 2021-03-30
Payer: COMMERCIAL

## 2021-03-30 VITALS
WEIGHT: 117 LBS | DIASTOLIC BLOOD PRESSURE: 75 MMHG | TEMPERATURE: 98.2 F | SYSTOLIC BLOOD PRESSURE: 108 MMHG | RESPIRATION RATE: 16 BRPM | BODY MASS INDEX: 20.08 KG/M2 | HEART RATE: 115 BPM | OXYGEN SATURATION: 99 %

## 2021-03-30 DIAGNOSIS — C25.9 PANCREATIC ADENOCARCINOMA (H): ICD-10-CM

## 2021-03-30 DIAGNOSIS — R11.0 NAUSEA: ICD-10-CM

## 2021-03-30 DIAGNOSIS — K21.9 GASTROESOPHAGEAL REFLUX DISEASE, UNSPECIFIED WHETHER ESOPHAGITIS PRESENT: Primary | ICD-10-CM

## 2021-03-30 PROCEDURE — 99214 OFFICE O/P EST MOD 30 MIN: CPT | Performed by: STUDENT IN AN ORGANIZED HEALTH CARE EDUCATION/TRAINING PROGRAM

## 2021-03-30 RX ORDER — BISACODYL 10 MG
10 SUPPOSITORY, RECTAL RECTAL
COMMUNITY
Start: 2021-03-07

## 2021-03-30 RX ORDER — METOCLOPRAMIDE 10 MG/1
TABLET ORAL
COMMUNITY
Start: 2021-03-01

## 2021-03-30 RX ORDER — SUCRALFATE 1 G/1
TABLET ORAL
COMMUNITY
Start: 2020-12-22

## 2021-03-30 RX ORDER — AMOXICILLIN 250 MG
2 CAPSULE ORAL
COMMUNITY
Start: 2021-03-07

## 2021-03-30 RX ORDER — SUCRALFATE ORAL 1 G/10ML
1 SUSPENSION ORAL 4 TIMES DAILY
Qty: 420 ML | Refills: 1 | Status: SHIPPED | OUTPATIENT
Start: 2021-03-30

## 2021-03-30 RX ORDER — METOCLOPRAMIDE 10 MG/1
10 TABLET ORAL
COMMUNITY
Start: 2021-03-01

## 2021-03-30 RX ORDER — PANTOPRAZOLE SODIUM 40 MG/1
40 TABLET, DELAYED RELEASE ORAL DAILY
Qty: 30 TABLET | Refills: 1 | Status: SHIPPED | OUTPATIENT
Start: 2021-03-30

## 2021-03-30 RX ORDER — PROCHLORPERAZINE MALEATE 10 MG
10 TABLET ORAL
COMMUNITY
Start: 2021-03-23 | End: 2022-03-23

## 2021-03-30 RX ORDER — MIRTAZAPINE 7.5 MG/1
7.5 TABLET, FILM COATED ORAL
COMMUNITY
Start: 2021-03-01

## 2021-03-30 RX ORDER — PANTOPRAZOLE SODIUM 40 MG/1
40 TABLET, DELAYED RELEASE ORAL
COMMUNITY
Start: 2021-03-01

## 2021-03-30 RX ORDER — ONDANSETRON 8 MG/1
8 TABLET, FILM COATED ORAL
COMMUNITY
Start: 2021-03-23 | End: 2022-03-23

## 2021-03-30 NOTE — NURSING NOTE
Due to patient being non-English speaking/uses sign language, an  was used for this visit. Only for face-to-face interpretation by an external agency, date and length of interpretation can be found on the scanned worksheet.     name: Moraima Rutherford  Agency: [employer]  Language: Chinese   Telephone number: [phone]  Type of interpretation:Telephone, spoken

## 2021-03-30 NOTE — LETTER
3/30/2021         RE: Julio Cesar Cha  [address] [facility] [address]      Kecia Xavier M.D., Ph.D.    [address] [place] [address]    Phone: [phone]    Fax: [phone]   [facility]           Assessment & Plan     Gastroesophageal reflux disease, unspecified whether esophagitis present  Symptoms somewhat difficult to pin down due to over the phone translation but it sounds like both acid reflux sx and nausea/vomiting sx. Did not have an adequate PPI trial. On sucralfate in the past but not recenlty. Will maximize acid suppression and educated on need for daily PPI for at least 2 weeks to assess benefit.   - sucralfate (CARAFATE) 1 GM/10ML suspension  Dispense: 420 mL; Refill: 1  - pantoprazole (PROTONIX) 40 MG EC tablet  Dispense: 30 tablet; Refill: 1    Nausea and vomiting  Started prior to chemotherapy. Wrote out instructions for all 3 antiemetics that pt already has at home (ondansetron, prochlorperazine, metoclopramide) and dosing for each so she understands what she can use. Discussed can use multiple in succession if not effective alone. Encouraged her to bring this up with her oncologist at next visit. I did not advise on an antihistamine to add given concern for sedation w/ opioids at home as well but this could be a next step.     Pancreatic adenocarcinoma (H)      Review of prior external note(s) from - CareEverywhere information from [place] reviewed  Diagnosis or treatment significantly limited by social determinants of health - language barrier, low health literacy  Prescription drug management  24 minutes spent on the date of the encounter doing chart review, history and exam, documentation and further activities per the note       Return if symptoms worsen or fail to improve.    DO IRASEMA [facility] BEENA Harrell is a 41 year old who presents for the following health issues     HPI   Patient with metastatic  "pancreatic adenocarcinoma w/ biliary obstruction s/p stenting in Feb, just started on chemotherapy 3/26/21 per chart review. Following w/ Oncology at [place].       Reflux:   \"anytime I eat food, I feel warm inside in the stomach, stomach upset.\" Feels like nausea, like she needs to vomit. Also describes reflux, feels food coming back up into her mouth, sometimes vomits with htat. Has been going on for 3 months. Has tried multiple meds in the past and nothing has helped.     Reviewed outside med list w/ patient. She took pantoprazole for 4 days and no benefit. Has all antiemetics at home and they have not helped her but it is hard for her to tell me how often/which she has been using.     Review of Systems   Pertinent positives and negatives per HPI.        Objective    /75   Pulse 115   Temp 98.2  F (36.8  C) (Oral)   Resp 16   Wt 53.1 kg (117 lb)   LMP 03/03/2021 (Approximate)   SpO2 99%   BMI 20.08 kg/m    Body mass index is 20.08 kg/m .  Physical Exam   GENERAL: tired-appearing, cooperative, in no acute distress  HEENT: sclera clear  PULM: normal respiratory effort   CV: regular rate, extremities warm and well perfused   NEURO: alert and oriented, grossly intact, moves all extremities, normal gait   SKIN: no rashes or lesions visualized   PSYCH: euthymic affect               Audrey Bansal,   "

## 2021-03-30 NOTE — PATIENT INSTRUCTIONS
For acid reflux:  1) Protonix (pantoprazole) 40mg 1 pill one time a day. Keep taking even if it does not help right away - it can take a week or two.   2) Carafate (sucralfate) liquid. Take 3-4 times per day. Take before eating.     For nausea and vomitin) Zofran (ondansetron): take 1 pill every 6-8 hours as needed for nausea    2) Compazine (prochlorperazine): Take 1 pill (10mg) every 6 hours as needed for nausea     3) Reglan (metoclopramide): Take 1 pill every 6 hours as needed for nausea.

## 2021-03-30 NOTE — PROGRESS NOTES
Kecia Xavier M.D., Ph.D.    [address] [place] [address]    Phone: [phone]    Fax: [phone]   [facility]           Assessment & Plan     Gastroesophageal reflux disease, unspecified whether esophagitis present  Symptoms somewhat difficult to pin down due to over the phone translation but it sounds like both acid reflux sx and nausea/vomiting sx. Did not have an adequate PPI trial. On sucralfate in the past but not recenlty. Will maximize acid suppression and educated on need for daily PPI for at least 2 weeks to assess benefit.   - sucralfate (CARAFATE) 1 GM/10ML suspension  Dispense: 420 mL; Refill: 1  - pantoprazole (PROTONIX) 40 MG EC tablet  Dispense: 30 tablet; Refill: 1    Nausea and vomiting  Started prior to chemotherapy. Wrote out instructions for all 3 antiemetics that pt already has at home (ondansetron, prochlorperazine, metoclopramide) and dosing for each so she understands what she can use. Discussed can use multiple in succession if not effective alone. Encouraged her to bring this up with her oncologist at next visit. I did not advise on an antihistamine to add given concern for sedation w/ opioids at home as well but this could be a next step.     Pancreatic adenocarcinoma (H)      Review of prior external note(s) from - CareEverywhere information from [place] reviewed  Diagnosis or treatment significantly limited by social determinants of health - language barrier, low health literacy  Prescription drug management  24 minutes spent on the date of the encounter doing chart review, history and exam, documentation and further activities per the note       Return if symptoms worsen or fail to improve.    DO IRASEMA Galvan [facility] BEENA Harrell is a 41 year old who presents for the following health issues     HPI   Patient with metastatic pancreatic adenocarcinoma w/ biliary obstruction s/p stenting in Feb, just started on  "chemotherapy 3/26/21 per chart review. Following w/ Oncology at [place].       Reflux:   \"anytime I eat food, I feel warm inside in the stomach, stomach upset.\" Feels like nausea, like she needs to vomit. Also describes reflux, feels food coming back up into her mouth, sometimes vomits with htat. Has been going on for 3 months. Has tried multiple meds in the past and nothing has helped.     Reviewed outside med list w/ patient. She took pantoprazole for 4 days and no benefit. Has all antiemetics at home and they have not helped her but it is hard for her to tell me how often/which she has been using.     Review of Systems   Pertinent positives and negatives per HPI.        Objective    /75   Pulse 115   Temp 98.2  F (36.8  C) (Oral)   Resp 16   Wt 53.1 kg (117 lb)   LMP 03/03/2021 (Approximate)   SpO2 99%   BMI 20.08 kg/m    Body mass index is 20.08 kg/m .  Physical Exam   GENERAL: tired-appearing, cooperative, in no acute distress  HEENT: sclera clear  PULM: normal respiratory effort   CV: regular rate, extremities warm and well perfused   NEURO: alert and oriented, grossly intact, moves all extremities, normal gait   SKIN: no rashes or lesions visualized   PSYCH: euthymic affect         "

## 2021-09-28 NOTE — NURSING NOTE
Due to patient being non-English speaking/uses sign language, an  was used for this visit. Only for face-to-face interpretation by an external agency, date and length of interpretation can be found on the scanned worksheet.     name: Alyse Roland  Agency: [employer]  Language: Haitian   Telephone number: [phone]  Type of interpretation: telephone, spoken        
allergic reaction

## 2021-10-13 ENCOUNTER — OFFICE VISIT (OUTPATIENT)
Dept: FAMILY MEDICINE | Facility: CLINIC | Age: 41
End: 2021-10-13
Payer: COMMERCIAL

## 2021-10-13 VITALS
RESPIRATION RATE: 16 BRPM | TEMPERATURE: 98.1 F | SYSTOLIC BLOOD PRESSURE: 110 MMHG | HEART RATE: 110 BPM | WEIGHT: 81.4 LBS | OXYGEN SATURATION: 100 % | BODY MASS INDEX: 13.9 KG/M2 | HEIGHT: 64 IN | DIASTOLIC BLOOD PRESSURE: 79 MMHG

## 2021-10-13 DIAGNOSIS — T45.1X5A CHEMOTHERAPY INDUCED DIARRHEA: Primary | ICD-10-CM

## 2021-10-13 DIAGNOSIS — E87.6 HYPOKALEMIA: ICD-10-CM

## 2021-10-13 DIAGNOSIS — K52.1 CHEMOTHERAPY INDUCED DIARRHEA: Primary | ICD-10-CM

## 2021-10-13 DIAGNOSIS — C25.9 PANCREATIC ADENOCARCINOMA (H): Chronic | ICD-10-CM

## 2021-10-13 DIAGNOSIS — E43 SEVERE PROTEIN-CALORIE MALNUTRITION (H): ICD-10-CM

## 2021-10-13 LAB
ANION GAP SERPL CALCULATED.3IONS-SCNC: 5 MMOL/L (ref 3–14)
BUN SERPL-MCNC: 4 MG/DL (ref 7–30)
CALCIUM SERPL-MCNC: 8.2 MG/DL (ref 8.5–10.1)
CHLORIDE BLD-SCNC: 107 MMOL/L (ref 94–109)
CO2 SERPL-SCNC: 26 MMOL/L (ref 20–32)
CREAT SERPL-MCNC: 0.43 MG/DL (ref 0.52–1.04)
GFR SERPL CREATININE-BSD FRML MDRD: >90 ML/MIN/1.73M2
GLUCOSE BLD-MCNC: 112 MG/DL (ref 70–99)
MAGNESIUM SERPL-MCNC: 1.6 MG/DL (ref 1.6–2.3)
POTASSIUM BLD-SCNC: 3.5 MMOL/L (ref 3.4–5.3)
SODIUM SERPL-SCNC: 138 MMOL/L (ref 133–144)

## 2021-10-13 PROCEDURE — 83735 ASSAY OF MAGNESIUM: CPT | Performed by: FAMILY MEDICINE

## 2021-10-13 PROCEDURE — 80048 BASIC METABOLIC PNL TOTAL CA: CPT | Performed by: FAMILY MEDICINE

## 2021-10-13 PROCEDURE — 99214 OFFICE O/P EST MOD 30 MIN: CPT | Performed by: FAMILY MEDICINE

## 2021-10-13 PROCEDURE — 36415 COLL VENOUS BLD VENIPUNCTURE: CPT | Performed by: FAMILY MEDICINE

## 2021-10-13 RX ORDER — OXYCODONE AND ACETAMINOPHEN 5; 325 MG/1; MG/1
2 TABLET ORAL EVERY 6 HOURS PRN
Qty: 32 TABLET | Refills: 0 | Status: SHIPPED | OUTPATIENT
Start: 2021-10-13 | End: 2021-10-21

## 2021-10-13 ASSESSMENT — MIFFLIN-ST. JEOR: SCORE: 1011.98

## 2021-10-13 NOTE — LETTER
October 14, 2021      Julio Ceasr Neil Cha  [address] [facility] [address]        Dear Julio Cesar,    Please see below for your test results.  Your sodium, potassium and magnesium are normal.     Your results are reassuring.If you have questions please contact the clinic to make an appointment with  me or your primary doctor to discuss the results. Kittitian translation:waxaan kirsten kuu gu xaqiijinay jawaabtaadii hadaad suaal qabto fadlan ernesto wac dhaqtarka kuu qaaska ah oo ka qabso liliana si uu kuugu sharxo jawaabtaada.    Resulted Orders   Basic metabolic panel   Result Value Ref Range    Sodium 138 133 - 144 mmol/L    Potassium 3.5 3.4 - 5.3 mmol/L    Chloride 107 94 - 109 mmol/L    Carbon Dioxide (CO2) 26 20 - 32 mmol/L    Anion Gap 5 3 - 14 mmol/L    Urea Nitrogen 4 (L) 7 - 30 mg/dL    Creatinine 0.43 (L) 0.52 - 1.04 mg/dL    Calcium 8.2 (L) 8.5 - 10.1 mg/dL    Glucose 112 (H) 70 - 99 mg/dL    GFR Estimate >90 >60 mL/min/1.73m2      Comment:      As of July 11, 2021, eGFR is calculated by the CKD-EPI creatinine equation, without race adjustment. eGFR can be influenced by muscle mass, exercise, and diet. The reported eGFR is an estimation only and is only applicable if the renal function is stable.   Magnesium   Result Value Ref Range    Magnesium 1.6 1.6 - 2.3 mg/dL       If you have any questions, please call the clinic to make an appointment.    Sincerely,    Ludy Hagen DO

## 2021-10-13 NOTE — PROGRESS NOTES
Assessment & Plan     Chemotherapy induced diarrhea  Encouraged patient to take immodium as prescribed - reviewed dosing with her.   Follow-up with her oncology team at [facility] to further discuss her symptoms.   - Basic metabolic panel  - Magnesium  - Basic metabolic panel  - Magnesium    Hypokalemia  Repeat potassium today, will replace as needed.   - Basic metabolic panel  - Magnesium  - Basic metabolic panel  - Magnesium    Pancreatic adenocarcinoma (H)  Refill provided to last until follow-up with oncology.   - oxyCODONE-acetaminophen (PERCOCET) 5-325 MG tablet  Dispense: 32 tablet; Refill: 0    Severe protein-calorie malnutrition (H)  Severe malnutrition, will continue to review records to see how to best to support patient with her metastatic pancreatic adenocarcinoma. Diarrhea is likely playing a role (thought to be chemotherapy induced per chart review).       Return in about 4 weeks (around 11/10/2021) for f/u hypokalemia.    Ludy Hagen DO  [facility] BEENA Harrell is a 41 year old who presents for the following health issues     Patient presents with:  Establish Care: trying to establish care ? currently going through chemo and says she needs a primary facility to come if she gets sick         HPI     Hospital Follow-up Visit:    Hospital/Nursing Home/IP Rehab Facility: [facility]  Date of Admission: 9/30/21  Date of Discharge: 10/2/21  Reason(s) for Admission: Vomiting, hypokalemia      Was your hospitalization related to COVID-19? No   Problems taking medications regularly:  None  Medication changes since discharge: None  Problems adhering to non-medication therapy:  None    Summary of hospitalization:  CareEverywhere information obtained and reviewed  Diagnostic Tests/Treatments reviewed.  Follow up needed: Oncology at [facility]  Other Healthcare Providers Involved in Patient s Care:         None  Update since discharge: stable.       Post Discharge Medication  "Reconciliation: discharge medications reconciled, continue medications without change.  Plan of care communicated with patient              Not taking loperimide every 2 hours, has taken 2 doses and states it didn't help.   Feels very tired and weakness.   Has appointment 10/21/21.     Pain medication:   5mg Oxycodone.   Was okay before but when she took it last time, had a reaction.   Had itching., last took this morning.       Review of Systems   Constitutional, HEENT, cardiovascular, pulmonary, gi and gu systems are negative, except as otherwise noted.      Objective    /79   Pulse 110   Temp 98.1  F (36.7  C) (Oral)   Resp 16   Ht 1.63 m (5' 4.17\")   Wt 36.9 kg (81 lb 6.4 oz)   LMP  (LMP Unknown)   SpO2 100%   BMI 13.90 kg/m    Body mass index is 13.9 kg/m .  Physical Exam  Constitutional:       General: She is not in acute distress.     Comments: Thin appearing.      HENT:      Head: Normocephalic and atraumatic.   Eyes:      Conjunctiva/sclera: Conjunctivae normal.   Neck:      Comments: Port in R upper chest  Pulmonary:      Effort: Pulmonary effort is normal.   Musculoskeletal:      Cervical back: Neck supple.   Skin:     General: Skin is warm and dry.   Neurological:      Mental Status: She is alert and oriented to person, place, and time.   Psychiatric:         Judgment: Judgment normal.              "

## 2021-10-14 NOTE — PROGRESS NOTES
Letter mailed to patient with normal results (as discussed at her appointment).   Ludy Hagen, DO

## 2021-11-20 ENCOUNTER — TRANSFERRED RECORDS (OUTPATIENT)
Dept: HEALTH INFORMATION MANAGEMENT | Facility: CLINIC | Age: 41
End: 2021-11-20
Payer: COMMERCIAL

## 2021-11-29 ENCOUNTER — NURSE TRIAGE (OUTPATIENT)
Dept: FAMILY MEDICINE | Facility: CLINIC | Age: 41
End: 2021-11-29
Payer: COMMERCIAL

## 2021-11-29 NOTE — TELEPHONE ENCOUNTER
"Warm handoff received. Patient was transferred to writer. Writer asked if patient needed an . Writer used [employer]  line for encounter.     Patient is calling in regards to bilateral lower extremity swelling. Patient stated that the swelling had started after leaving the hospital on 11/25/21. Patient stated the she can barley moved and is afraid of falling down. Patient rated her pain in BLE a 3/10. Pt is unknown of what is causing the swelling.     Patient denies blue bilateral lower extremities, numbness or tingling. Denies having any open wounds or sores. Patient states that her legs are not weeping and there is no fluid coming through her skin. Patient also denies shortness of breath or chest pain. Patient only states having increased lethargy that started in the beginning of last week and is having back pain.     Patient confirms that her bilateral lower extremities are cool to the touch.     Writer advised that patient be seen in the ER. Patient verbalized understanding and will go as soon as she has a ride. Patient verbalized will go to nearest ER today. NO further questions or concerns at this time.     Jessica Paul RN, BSN  [facility]        Reason for Disposition    Can't walk or can barely stand (new onset)    Answer Assessment - Initial Assessment Questions  1. ONSET: \"When did the swelling start?\" (e.g., minutes, hours, days)      Swelling began after leaving the hospital- swelling started on thanksgiving per Patient.     2. LOCATION: \"What part of the leg is swollen?\"  \"Are both legs swollen or just one leg?\"      Swelling is below the knee, on both sides from below the knees down to feet and ankles bilaterally.    3. SEVERITY: \"How bad is the swelling?\" (e.g., localized; mild, moderate, severe)   - Localized - small area of swelling localized to one leg   - MILD pedal edema - swelling limited to foot and ankle, pitting edema < 1/4 inch (6 mm) deep, " "rest and elevation eliminate most or all swelling   - MODERATE edema - swelling of lower leg to knee, pitting edema > 1/4 inch (6 mm) deep, rest and elevation only partially reduce swelling   - SEVERE edema - swelling extends above knee, facial or hand swelling present       Patient states that the swelling is moderate. Cant move sometimes, afraid I will fall down.    4. REDNESS: \"Does the swelling look red or infected?\"      Denies reddness or looking infected.    5. PAIN: \"Is the swelling painful to touch?\" If so, ask: \"How painful is it?\"   (Scale 1-10; mild, moderate or severe)     Patient rates the pain a 3/10.     6. FEVER: \"Do you have a fever?\" If so, ask: \"What is it, how was it measured, and when did it start?\"       No fever- patient denied feeling sick or warm just having increased lethargy that started the beginning of last week.     7. CAUSE: \"What do you think is causing the leg swelling?\"      Does not known what is going on. Patient was requesting to having blood drawn to figure it out.    8. MEDICAL HISTORY: \"Do you have a history of heart failure, kidney disease, liver failure, or cancer?\"      Cancer pancreatic per patient- chemo, once every two weeks.     9. RECURRENT SYMPTOM: \"Have you had leg swelling before?\" If so, ask: \"When was the last time?\" \"What happened that time?\"      No Pt stated has never had leg swelling before.    10. OTHER SYMPTOMS: \"Do you have any other symptoms?\" (e.g., chest pain, difficulty breathing)        Increased lethary since the beginning of last week. Denies chest pain and SHORTNESS OF BREATH. Complains of back pain.     11. PREGNANCY: \"Is there any chance you are pregnant?\" \"When was your last menstrual period?\"        Patient denies being pregnant last period was one month ago. Patient stated when on chemo does not get period but when gets off chemo then her period starts up again.    Protocols used: LEG SWELLING AND EDEMA-A-OH    Jessica Paul RN, BSN  M " [facility]

## 2021-11-30 ENCOUNTER — TELEPHONE (OUTPATIENT)
Dept: FAMILY MEDICINE | Facility: CLINIC | Age: 41
End: 2021-11-30
Payer: COMMERCIAL

## 2021-11-30 NOTE — TELEPHONE ENCOUNTER
[facility]: Post-Discharge Note  SITUATION                                                      Admission: 11/20/21        Discharge: 11/26/21       BACKGROUND                                                      Julio Cesar Cha is a 41 y.o. female with a history of pancreatic cancer and biliary stricture with plastic stent in place, currently undergoing chemo therapy through her right chest wall port, presenting to the emergency department today for evaluation of worsening abdominal pain and low back pain, vomiting, and diarrhea. She has generalized abdominal tenderness. Labs are unremarkable. CT of the abdomen and pelvis show some increase in the size of her pancreatic mass compared to last CT scan 9 months ago. She also has new liver lesions concerning for metastases. Patient has some wall thickening of the cecum through the mid transverse colon concerning for infectious or inflammatory colitis. 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 Patient will be admitted for further management. She is made aware of the new findings on her CT scan. C.diff pending. Pt was amenable to this plan of care and had no further questions. Pt stable throughout her time in the ED under my care and at the time of my signout pending transfer to the floor under the care of Dr. Cline.         ASSESSMENT        RN spoke with pt who states she is doing ok. Went to ED again today for neck swelling but was told nothing is wrong. Has all meds    PLAN                                                      Outpatient Plan:     Future Appointments   Date Time Provider Department Center   12/1/2021 11:00 AM Audrey Bansal DO [facility]         For any urgent concerns, please contact our 24 hour nurse triage line: [phone] ([phone])         Talya Hatfield RN

## 2021-12-01 ENCOUNTER — OFFICE VISIT (OUTPATIENT)
Dept: FAMILY MEDICINE | Facility: CLINIC | Age: 41
End: 2021-12-01
Payer: COMMERCIAL

## 2021-12-01 VITALS
SYSTOLIC BLOOD PRESSURE: 105 MMHG | TEMPERATURE: 98 F | WEIGHT: 86.4 LBS | HEART RATE: 92 BPM | RESPIRATION RATE: 16 BRPM | DIASTOLIC BLOOD PRESSURE: 74 MMHG | BODY MASS INDEX: 14.75 KG/M2 | OXYGEN SATURATION: 100 %

## 2021-12-01 DIAGNOSIS — K52.1 CHEMOTHERAPY INDUCED DIARRHEA: ICD-10-CM

## 2021-12-01 DIAGNOSIS — E87.6 HYPOKALEMIA: Primary | ICD-10-CM

## 2021-12-01 DIAGNOSIS — T45.1X5A CHEMOTHERAPY INDUCED DIARRHEA: ICD-10-CM

## 2021-12-01 DIAGNOSIS — D64.9 ANEMIA, UNSPECIFIED TYPE: ICD-10-CM

## 2021-12-01 LAB
ANION GAP SERPL CALCULATED.3IONS-SCNC: 6 MMOL/L (ref 3–14)
BUN SERPL-MCNC: 8 MG/DL (ref 7–30)
CALCIUM SERPL-MCNC: 7.9 MG/DL (ref 8.5–10.1)
CHLORIDE BLD-SCNC: 106 MMOL/L (ref 94–109)
CO2 SERPL-SCNC: 26 MMOL/L (ref 20–32)
CREAT SERPL-MCNC: 0.25 MG/DL (ref 0.52–1.04)
ERYTHROCYTE [DISTWIDTH] IN BLOOD BY AUTOMATED COUNT: 16.5 % (ref 10–15)
GFR SERPL CREATININE-BSD FRML MDRD: >90 ML/MIN/1.73M2
GLUCOSE BLD-MCNC: 101 MG/DL (ref 70–99)
HCT VFR BLD AUTO: 29.9 %
HGB BLD-MCNC: 9.4 G/DL (ref 11.7–15.7)
MCH RBC QN AUTO: 28.7 PG (ref 26.5–33)
MCHC RBC AUTO-ENTMCNC: 31.4 G/DL (ref 31.5–36.5)
MCV RBC AUTO: 91 FL (ref 78–100)
PLATELET # BLD AUTO: 265 10E3/UL (ref 150–450)
POTASSIUM BLD-SCNC: 3.8 MMOL/L (ref 3.4–5.3)
RBC # BLD AUTO: 3.27 10E6/UL (ref 3.8–5.2)
SODIUM SERPL-SCNC: 138 MMOL/L (ref 133–144)
WBC # BLD AUTO: 9.3 10E3/UL (ref 4–11)

## 2021-12-01 PROCEDURE — 85027 COMPLETE CBC AUTOMATED: CPT | Performed by: STUDENT IN AN ORGANIZED HEALTH CARE EDUCATION/TRAINING PROGRAM

## 2021-12-01 PROCEDURE — 80048 BASIC METABOLIC PNL TOTAL CA: CPT | Performed by: STUDENT IN AN ORGANIZED HEALTH CARE EDUCATION/TRAINING PROGRAM

## 2021-12-01 PROCEDURE — 99214 OFFICE O/P EST MOD 30 MIN: CPT | Mod: GC | Performed by: STUDENT IN AN ORGANIZED HEALTH CARE EDUCATION/TRAINING PROGRAM

## 2021-12-01 PROCEDURE — 36415 COLL VENOUS BLD VENIPUNCTURE: CPT | Performed by: STUDENT IN AN ORGANIZED HEALTH CARE EDUCATION/TRAINING PROGRAM

## 2021-12-02 RX ORDER — POTASSIUM CHLORIDE 1500 MG/1
20 TABLET, EXTENDED RELEASE ORAL 2 TIMES DAILY
Qty: 180 TABLET | Refills: 0 | Status: SHIPPED | OUTPATIENT
Start: 2021-12-02

## 2022-01-04 NOTE — PROGRESS NOTES
Preceptor Attestation:  Patient seen and evaluated in person. I discussed the patient with the resident. I have verified the content of the note, which accurately reflects my assessment of the patient and the plan of care.   Supervising Physician:  Audrey Bansal DO

## 2025-05-07 NOTE — MR AVS SNAPSHOT
"              After Visit Summary   2018    Julio Cesar Cha    MRN: [id number]           Patient Information     Date Of Birth          1980        Visit Information        Provider Department      2018 2:40 PM Vikki Herr PA-C [facility]        Today's Diagnoses     Pregnancy test positive    -  1    Amenorrhea           Follow-ups after your visit        Who to contact     If you have questions or need follow up information about today's clinic visit or your schedule please contact [facility] directly at [phone].  Normal or non-critical lab and imaging results will be communicated to you by [employer], letter or phone within 4 business days after the clinic has received the results. If you do not hear from us within 7 days, please contact the clinic through [employer] or phone. If you have a critical or abnormal lab result, we will notify you by phone as soon as possible.  Submit refill requests through [facility] or call your pharmacy and they will forward the refill request to us. Please allow 3 business days for your refill to be completed.          Additional Information About Your Visit        MyChart Information     [facility] lets you send messages to your doctor, view your test results, renew your prescriptions, schedule appointments and more. To sign up, go to [email or web address] . Click on \"Log in\" on the left side of the screen, which will take you to the Welcome page. Then click on \"Sign up Now\" on the right side of the page.     You will be asked to enter the access code listed below, as well as some personal information. Please follow the directions to create your username and password.     Your access code is: [email or web address]  Expires: 2018  2:56 PM     Your access code will  in 90 days. If you need help or a new code, please call your [facility] or [phone].        Care EveryWhere ID     This is your Care EveryWhere ID. This could " be used by other organizations to access your [facility] medical records  [id number]        Your Vitals Were     Pulse Temperature Respirations Last Period Pulse Oximetry BMI (Body Mass Index)    88 97  F (36.1  C) (Oral) 14 04/08/2018 (Exact Date) 100% 28.37 kg/m2       Blood Pressure from Last 3 Encounters:   05/08/18 119/69   05/11/17 106/68   05/01/17 105/71    Weight from Last 3 Encounters:   05/08/18 164 lb (74.4 kg)   05/11/17 148 lb (67.1 kg)   05/01/17 148 lb 3.2 oz (67.2 kg)              We Performed the Following     Beta HCG Qual, Urine - [facility] and [place] ([id number])          Today's Medication Changes          These changes are accurate as of 5/8/18  2:56 PM.  If you have any questions, ask your nurse or doctor.               These medicines have changed or have updated prescriptions.        Dose/Directions    * prenatal multivitamin plus iron 27-0.8 MG Tabs per tablet   This may have changed:  Another medication with the same name was added. Make sure you understand how and when to take each.   Used for:  Healthcare maintenance   Changed by:  Vikki Herr PA-C        Dose:  1 tablet   Take 1 tablet by mouth daily   Quantity:  100 tablet   Refills:  0       * prenatal multivitamin  with iron 28-0.8 MG Tabs   This may have changed:  You were already taking a medication with the same name, and this prescription was added. Make sure you understand how and when to take each.   Used for:  Pregnancy test positive   Changed by:  Vikki Herr PA-C        Dose:  1 tablet   Take 1 tablet by mouth daily   Quantity:  90 each   Refills:  2       * Notice:  This list has 2 medication(s) that are the same as other medications prescribed for you. Read the directions carefully, and ask your doctor or other care provider to review them with you.         Where to get your medicines      These medications were sent to [facility] [place], [place] - [address], [address]  [address],  [address], [facility] [address]     Phone:  [phone]     prenatal multivitamin  with iron 28-0.8 MG Tabs                Primary Care Provider Office Phone # Fax #    Ying Mahan [phone]       [address]   [facility] [address]        Equal Access to Services     [facility] Hospital[facility] : Hadii aad ku hadasho Soomaali, waaxda luqadaha, qaybta kaalmada adeegyada, waxsantos gómezin hayaan adeeg vickiealice lamedhat . So [facility] [phone].    ATENCIÓN: Si habla español, tiene a ravi disposición servicios gratuitos de asistencia lingüística. Remigiokaryn al [phone].    We comply with applicable federal civil rights laws and [place] laws. We do not discriminate on the basis of race, color, national origin, age, disability, sex, sexual orientation, or gender identity.            Thank you!     Thank you for choosing [facility]  for your care. Our goal is always to provide you with excellent care. Hearing back from our patients is one way we can continue to improve our services. Please take a few minutes to complete the written survey that you may receive in the mail after your visit with us. Thank you!             Your Updated Medication List - Protect others around you: Learn how to safely use, store and throw away your medicines at www.disposemymeds.org.          This list is accurate as of 5/8/18  2:56 PM.  Always use your most recent med list.                   Brand Name Dispense Instructions for use Diagnosis    acetaminophen 500 MG tablet    TYLENOL    100 tablet    Take 1-2 tablets (500-1,000 mg) by mouth every 6 hours as needed for mild pain    Neck pain       hydrocortisone valerate 0.2 % ointment    WEST-GABRIEL    45 g    Apply sparingly to affected area three times daily as needed.    Rash       * prenatal multivitamin plus iron 27-0.8 MG Tabs per tablet     100 tablet    Take 1 tablet by mouth daily    Healthcare maintenance       * prenatal multivitamin  with iron 28-0.8 MG  Tabs     90 each    Take 1 tablet by mouth daily    Pregnancy test positive       triamcinolone 0.5 % cream    KENALOG    30 g    Apply sparingly to affected area three times daily.    Allergic reaction, initial encounter       vitamin D 2000 units tablet     100 tablet    Take 2 tablets by mouth daily    Vitamin D deficiency       * Notice:  This list has 2 medication(s) that are the same as other medications prescribed for you. Read the directions carefully, and ask your doctor or other care provider to review them with you.       Former smoker

## (undated) DEVICE — PREP CHLORAPREP 26ML TINTED ORANGE  260815

## (undated) DEVICE — SOL WATER IRRIG 1000ML BOTTLE 07139-09

## (undated) RX ORDER — FENTANYL CITRATE 50 UG/ML
INJECTION, SOLUTION INTRAMUSCULAR; INTRAVENOUS
Status: DISPENSED
Start: 2020-11-19